# Patient Record
Sex: FEMALE | Race: WHITE | NOT HISPANIC OR LATINO | Employment: OTHER | URBAN - METROPOLITAN AREA
[De-identification: names, ages, dates, MRNs, and addresses within clinical notes are randomized per-mention and may not be internally consistent; named-entity substitution may affect disease eponyms.]

---

## 2017-02-01 ENCOUNTER — TRANSCRIBE ORDERS (OUTPATIENT)
Dept: ADMINISTRATIVE | Facility: HOSPITAL | Age: 77
End: 2017-02-01

## 2017-02-01 ENCOUNTER — APPOINTMENT (OUTPATIENT)
Dept: LAB | Facility: HOSPITAL | Age: 77
End: 2017-02-01
Attending: INTERNAL MEDICINE
Payer: MEDICARE

## 2017-02-01 DIAGNOSIS — C54.1 MALIGNANT NEOPLASM OF ENDOMETRIUM (HCC): ICD-10-CM

## 2017-02-01 DIAGNOSIS — I10 ESSENTIAL (PRIMARY) HYPERTENSION: ICD-10-CM

## 2017-02-01 LAB
ALBUMIN SERPL BCP-MCNC: 3.3 G/DL (ref 3.5–5)
ALP SERPL-CCNC: 90 U/L (ref 46–116)
ALT SERPL W P-5'-P-CCNC: 29 U/L (ref 12–78)
ANION GAP SERPL CALCULATED.3IONS-SCNC: 8 MMOL/L (ref 4–13)
AST SERPL W P-5'-P-CCNC: 26 U/L (ref 5–45)
BILIRUB SERPL-MCNC: 0.5 MG/DL (ref 0.2–1)
BUN SERPL-MCNC: 18 MG/DL (ref 5–25)
CALCIUM SERPL-MCNC: 8.9 MG/DL (ref 8.3–10.1)
CHLORIDE SERPL-SCNC: 103 MMOL/L (ref 100–108)
CO2 SERPL-SCNC: 29 MMOL/L (ref 21–32)
CREAT SERPL-MCNC: 0.88 MG/DL (ref 0.6–1.3)
ERYTHROCYTE [DISTWIDTH] IN BLOOD BY AUTOMATED COUNT: 12.9 % (ref 11.6–15.1)
GFR SERPL CREATININE-BSD FRML MDRD: >60 ML/MIN/1.73SQ M
GLUCOSE SERPL-MCNC: 92 MG/DL (ref 65–140)
HCT VFR BLD AUTO: 39.5 % (ref 34.8–46.1)
HGB BLD-MCNC: 12.6 G/DL (ref 11.5–15.4)
MCH RBC QN AUTO: 30 PG (ref 26.8–34.3)
MCHC RBC AUTO-ENTMCNC: 31.9 G/DL (ref 31.4–37.4)
MCV RBC AUTO: 94 FL (ref 82–98)
PLATELET # BLD AUTO: 180 THOUSANDS/UL (ref 149–390)
PMV BLD AUTO: 10.7 FL (ref 8.9–12.7)
POTASSIUM SERPL-SCNC: 4.2 MMOL/L (ref 3.5–5.3)
PROT SERPL-MCNC: 6.7 G/DL (ref 6.4–8.2)
RBC # BLD AUTO: 4.2 MILLION/UL (ref 3.81–5.12)
SODIUM SERPL-SCNC: 140 MMOL/L (ref 136–145)
TSH SERPL DL<=0.05 MIU/L-ACNC: 2.28 UIU/ML (ref 0.36–3.74)
WBC # BLD AUTO: 4.1 THOUSAND/UL (ref 4.31–10.16)

## 2017-02-01 PROCEDURE — 80053 COMPREHEN METABOLIC PANEL: CPT

## 2017-02-01 PROCEDURE — 36415 COLL VENOUS BLD VENIPUNCTURE: CPT

## 2017-02-01 PROCEDURE — 84443 ASSAY THYROID STIM HORMONE: CPT

## 2017-02-01 PROCEDURE — 85027 COMPLETE CBC AUTOMATED: CPT

## 2017-02-05 ENCOUNTER — GENERIC CONVERSION - ENCOUNTER (OUTPATIENT)
Dept: OTHER | Facility: OTHER | Age: 77
End: 2017-02-05

## 2017-02-10 ENCOUNTER — GENERIC CONVERSION - ENCOUNTER (OUTPATIENT)
Dept: OTHER | Facility: OTHER | Age: 77
End: 2017-02-10

## 2017-02-13 ENCOUNTER — HOSPITAL ENCOUNTER (OUTPATIENT)
Dept: CT IMAGING | Facility: HOSPITAL | Age: 77
Discharge: HOME | End: 2017-02-13
Attending: OBSTETRICS & GYNECOLOGY
Payer: MEDICARE

## 2017-02-13 DIAGNOSIS — C54.1 MALIGNANT NEOPLASM OF ENDOMETRIUM (HCC): ICD-10-CM

## 2017-02-13 PROCEDURE — 74177 CT ABD & PELVIS W/CONTRAST: CPT

## 2017-02-13 PROCEDURE — 71260 CT THORAX DX C+: CPT

## 2017-02-13 RX ADMIN — IOHEXOL 100 ML: 240 INJECTION, SOLUTION INTRATHECAL; INTRAVASCULAR; INTRAVENOUS; ORAL at 14:17

## 2017-02-15 ENCOUNTER — ALLSCRIPTS OFFICE VISIT (OUTPATIENT)
Dept: OTHER | Facility: OTHER | Age: 77
End: 2017-02-15

## 2017-03-09 ENCOUNTER — ALLSCRIPTS OFFICE VISIT (OUTPATIENT)
Dept: OTHER | Facility: OTHER | Age: 77
End: 2017-03-09

## 2017-05-17 ENCOUNTER — ALLSCRIPTS OFFICE VISIT (OUTPATIENT)
Dept: OTHER | Facility: OTHER | Age: 77
End: 2017-05-17

## 2017-05-31 ENCOUNTER — GENERIC CONVERSION - ENCOUNTER (OUTPATIENT)
Dept: OTHER | Facility: OTHER | Age: 77
End: 2017-05-31

## 2017-07-10 ENCOUNTER — ALLSCRIPTS OFFICE VISIT (OUTPATIENT)
Dept: OTHER | Facility: OTHER | Age: 77
End: 2017-07-10

## 2017-07-10 DIAGNOSIS — Z13.820 ENCOUNTER FOR SCREENING FOR OSTEOPOROSIS: ICD-10-CM

## 2017-07-19 ENCOUNTER — HOSPITAL ENCOUNTER (OUTPATIENT)
Dept: BONE DENSITY | Facility: IMAGING CENTER | Age: 77
Discharge: HOME | End: 2017-07-19
Payer: MEDICARE

## 2017-07-19 DIAGNOSIS — Z13.820 ENCOUNTER FOR SCREENING FOR OSTEOPOROSIS: ICD-10-CM

## 2017-07-19 PROCEDURE — 77080 DXA BONE DENSITY AXIAL: CPT

## 2017-07-20 ENCOUNTER — GENERIC CONVERSION - ENCOUNTER (OUTPATIENT)
Dept: OTHER | Facility: OTHER | Age: 77
End: 2017-07-20

## 2017-07-27 ENCOUNTER — ALLSCRIPTS OFFICE VISIT (OUTPATIENT)
Dept: OTHER | Facility: OTHER | Age: 77
End: 2017-07-27

## 2017-08-16 ENCOUNTER — ALLSCRIPTS OFFICE VISIT (OUTPATIENT)
Dept: OTHER | Facility: OTHER | Age: 77
End: 2017-08-16

## 2017-08-22 ENCOUNTER — GENERIC CONVERSION - ENCOUNTER (OUTPATIENT)
Dept: OTHER | Facility: OTHER | Age: 77
End: 2017-08-22

## 2017-09-12 ENCOUNTER — GENERIC CONVERSION - ENCOUNTER (OUTPATIENT)
Dept: OTHER | Facility: OTHER | Age: 77
End: 2017-09-12

## 2017-10-11 ENCOUNTER — GENERIC CONVERSION - ENCOUNTER (OUTPATIENT)
Dept: OTHER | Facility: OTHER | Age: 77
End: 2017-10-11

## 2017-10-17 ENCOUNTER — ALLSCRIPTS OFFICE VISIT (OUTPATIENT)
Dept: OTHER | Facility: OTHER | Age: 77
End: 2017-10-17

## 2017-10-18 NOTE — PROGRESS NOTES
Assessment  1  Hearing loss of left ear (389 9) (H91 92)    Plan  Hearing loss of left ear    · Removal Impacted Cerumen Using Irrigation / Lavage one or both ears - POC; Status:Complete;    Done: 06PTB6033 02:36PM    Chief Complaint  Chief Complaint Free Text Note Form: Pt is here today for a check up visit , Medications are current   History of Present Illness  Hearing Loss (Brief): The patient is being seen for a routine clinic follow-up of hearing loss  The patient presents with complaints of gradual onset of moderate left ear hearing loss, described as difficulty with all sounds  Review of Systems  Complete-Female:   Constitutional: no fever  Cardiovascular: no chest pain  Respiratory: no shortness of breath  Active Problems  1  Active advance directive (V49 89) (Z78 9)   2  Allergic rhinitis (477 9) (J30 9)   3  Anxiety (300 00) (F41 9)   4  Drug-induced neutropenia (288 03,E980 5) (D70 2)   5  Endometrial adenocarcinoma (182 0) (C54 1)   6  External hemorrhoids (455 3) (K64 4)   7  Gastroesophageal reflux disease without esophagitis (530 81) (K21 9)   8  HTN (hypertension) (401 9) (I10)   9  Denied: History of Mental health problem   10  Morbid obesity with BMI of 45 0-49 9, adult (278 01,V85 42) (E66 01,Z68 42)   11  Need for pneumococcal vaccine (V03 82) (Z23)   12  Osteoarthrosis (715 90) (M19 90)   13  Other hyperlipidemia (272 4) (E78 4)   14  Denied: History of Substance abuse    Past Medical History  1  Cataract (366 9) (H26 9)   2  History of CVA (cerebral infarction) (434 91) (I63 9)   3  History of Depression (311) (F32 9)   4  History of Dietary counseling and surveillance (V65 3) (Z71 3)   5  History of Distress, epigastric (789 06) (R10 13)   6  History of Ear ringing sound (388 30) (H93 19)   7  History of  0 (V49 89)   8  Hearing loss of left ear (389 9) (H91 92)   9  Impacted cerumen of left ear (380 4) (H61 22)   10  History of Influenza (487 1) (J11 1)   11  Denied: History of Mental health problem   12  Preoperative examination (V72 84) (Z01 818)   13  Retinopathy (362 10) (H35 00)   14  Denied: History of Substance abuse   15  History of URI (upper respiratory infection) (465 9) (J06 9)  Active Problems And Past Medical History Reviewed: The active problems and past medical history were reviewed and updated today  Surgical History  1  History of Eye Surgery   2  Denied: History Of Prior Surgery   3  History of Hysterectomy Robotic-Assisted   4  History of Salpingo-oophorectomy Bilateral    Family History  Mother    1  Family history of congestive heart failure (V17 49) (Z82 49)  Father    2  Family history of Alzheimer's disease (V17 2) (Z82 0)  Sister    3  Family history of malignant neoplasm of breast (V16 3) (Z80 3)  Brother    4  Family history of malignant neoplasm of prostate (V16 42) (Z80 45)  Family History    5  Denied: Family history of substance abuse   6  Denied: Family history of Mental problem    Social History   · Active advance directive (V49 89) (Z78 9)   · Always uses seat belt   · Caffeine use (V49 89) (F15 90)   · Cultural background   · Dental care, regularly   · Exercise: Yardwork   · Denied: History of Finding of    · Living alone (V60 3) (Z60 2)   · Living Situation: Supportive and safe   · Never a smoker   · No drug use   · Primary spoken language English   · Muslim   · Racial background   · Recent change in lifestyle   · Single   · Social alcohol use (Z78 9)    Current Meds   1  Aspirin 81 MG Oral Tablet Chewable; USE AS DIRECTED; Therapy: (Recorded:13Wuc2516) to Recorded   2  Atorvastatin Calcium 10 MG Oral Tablet; TAKE ONE TABLET BY MOUTH ONCE DAILY; Therapy: 26FMT3943 to (Evaluate:23Vyr5951); Last Rx:52Woy1476 Ordered   3  Calcium 600 + D 600-200 MG-UNIT Oral Tablet; TAKE 1 TABLET DAILY; Therapy: 64AHQ4145 to Recorded   4   LORazepam 1 MG Oral Tablet; 1 tablet daily  and an additional 1/2 tab prn; Last Rx: 78TRZ2166   Ordered   5  Metoprolol Succinate ER 25 MG Oral Tablet Extended Release 24 Hour; take one tablet by mouth one   time daily; Therapy: 18CTV5108 to (Mona August)  Requested for: 07SQK5798; Last Rx:79Prf3280   Ordered   6  Multiple Vitamin TABS; TAKE 1 TABLET DAILY; Therapy: (Recorded:08Jan2016) to Recorded   7  Valsartan 320 MG Oral Tablet; TAKE 1 TABLET DAILY; Therapy: 18BZS3893 to (Evaluate:06Apr2018); Last Rx:98Uqp4473 Ordered   8  Vitamin D3 1000 UNIT Oral Tablet; 1 qd; Therapy: (Recorded:08Jan2016) to Recorded  Medication List Reviewed: The medication list was reviewed and updated today  Allergies  1  Aspirin TABS   2  FLU   3  ACE Inhibitors   4  Zithromax TABS  5  Chocolate   6  Eggs    Vitals  Vital Signs    Recorded: 54HJK7876 01:59PM   Heart Rate 60, R Radial   Pulse Quality Normal, R Radial   Systolic 350, LUE, Sitting   Diastolic 72, LUE, Sitting   Height 4 ft 11 in   Weight 216 lb 0 5 oz   BMI Calculated 43 63   BSA Calculated 1 91     Physical Exam    Constitutional   General appearance: Abnormal   well developed,-- comfortable-- and-- obese  Ears, Nose, Mouth, and Throat   Otoscopic examination: Abnormal   The left external canal had a cerumen impaction  Pulmonary   Auscultation of lungs: Clear to auscultation  Cardiovascular   Auscultation of heart: Normal rate and rhythm, normal S1 and S2, without murmurs  Procedure            Procedure: cerumen removal    Indication: tympanic membrane(s) could not be visualized and cerumen impaction in the left ear  Procedure Note: The procedure was performed by the Provider  A otoscope was placed in the ear canal(s) to visualize the ear canal debris  The ear was cleaned by using warm water irrigation  The procedure was partially successful  Post-Procedure:   Complications: there were no complications  Follow-up as needed              Future Appointments    Date/Time Provider Specialty Site   11/15/2017 01:15 PM Domenica Wesley MD Gynecological Oncology [facility]     Signatures   Electronically signed by : BRISA Oropeza ; Oct 17 2017  2:36PM EST                       (Author)

## 2017-11-15 ENCOUNTER — GENERIC CONVERSION - ENCOUNTER (OUTPATIENT)
Dept: OTHER | Facility: OTHER | Age: 77
End: 2017-11-15

## 2017-11-15 ENCOUNTER — TRANSCRIBE ORDERS (OUTPATIENT)
Dept: ADMINISTRATIVE | Facility: HOSPITAL | Age: 77
End: 2017-11-15

## 2017-11-15 DIAGNOSIS — C54.1 ENDOMETRIAL SARCOMA (HCC): Primary | ICD-10-CM

## 2018-01-09 NOTE — PROGRESS NOTES
Assessment    1  HTN (hypertension) (401 9) (I10)   2  Drug-induced neutropenia (288 03,E980 5) (D70 2)   3  Morbid obesity with BMI of 45 0-49 9, adult (278 01,V85 42) (N82 56,S93 56)    Plan  Morbid obesity with BMI of 45 0-49 9, adult    · Some eating tips that can help you lose weight ; Status:Complete;   Done: 41QWC2983 01:06PM    Chief Complaint  Chief Complaint Free Text Note Form: Pt is here today for a 4 month follow up visit, Pt had labs done recently  Pt was to see Dr Melanie Jones since her last office visit Reviewed pt med list she needs a few refills DD      History of Present Illness  Hypertension (Follow-Up): The patient presents for follow-up of essential hypertension  The patient states she has been stable with her blood pressure control since the last visit  Symptoms: denies dyspnea and denies chest pain  Medications: the patient is adherent with her medication regimen  She denies medication side effects  Review of Systems  Complete-Female:   Constitutional: recent 4 lb weight gain, but no fever and no chills  Eyes: no eyesight problems  ENT: no hearing loss  Cardiovascular: as noted in HPI  Respiratory: as noted in HPI  Gastrointestinal: no abdominal pain, no constipation, no blood in stools and no heartburn  Genitourinary: no dysuria and no incontinence  Integumentary: no rashes  Neurological: no headache  Psychiatric: no anxiety and no depression  Active Problems    1  Active advance directive (V49 89) (Z78 9)   2  Allergic rhinitis (477 9) (J30 9)   3  Anxiety (300 00) (F41 9)   4  Drug-induced neutropenia (288 03,E980 5) (D70 2)   5  Encounter for antineoplastic chemotherapy (V58 11) (Z51 11)   6  Endometrial adenocarcinoma (182 0) (C54 1)   7  External hemorrhoids (455 3) (K64 4)   8  Gastroesophageal reflux disease without esophagitis (530 81) (K21 9)   9  History of self breast exam   10  HTN (hypertension) (401 9) (I10)   11   Impacted cerumen of left ear (380 4) (H61 22)   12  Denied: History of Mental health problem   13  Morbid obesity with BMI of 45 0-49 9, adult (278 01,V85 42) (E66 01,Z68 42)   14  Need for pneumococcal vaccine (V03 82) (Z23)   15  No advance directive on file (V49 89) (Z78 9)   16  Osteoarthrosis (715 90) (M19 90)   17  Other hyperlipidemia (272 4) (E78 4)   18  Postoperative examination (V67 00) (Z09)   19  Preoperative examination (V72 84) (Z01 818)   20  Screening for genitourinary condition (V81 6) (Z13 89)   21  Denied: History of Substance abuse    Past Medical History    1  History of CVA (cerebral infarction) (434 91) (I63 9)   2  History of Depression (311) (F32 9)   3  History of Dietary counseling and surveillance (V65 3) (Z71 3)   4  History of Distress, epigastric (789 06) (R10 13)   5  History of Ear ringing sound (388 30) (H93 19)   6  History of  0 (V49 89) (Z78 9)   7  Hearing loss of left ear (389 9) (H91 92)   8  History of Influenza (487 1) (J11 1)   9  Denied: History of Mental health problem   10  Retinopathy (362 10) (H35 00)   11  Denied: History of Substance abuse   12  History of URI (upper respiratory infection) (465 9) (J06 9)  Active Problems And Past Medical History Reviewed: The active problems and past medical history were reviewed and updated today  Surgical History    1  History of Eye Surgery   2  Denied: History Of Prior Surgery   3  History of Hysterectomy Robotic-Assisted   4  History of Salpingo-oophorectomy Bilateral  Surgical History Reviewed: The surgical history was reviewed and updated today  Family History  Mother    1  Family history of congestive heart failure (V17 49) (Z82 49)  Father    2  Family history of Alzheimer's disease (V17 2) (Z82 0)  Sister    3  Family history of malignant neoplasm of breast (V16 3) (Z80 3)  Brother    4  Family history of malignant neoplasm of prostate (V16 42) (Z80 45)  Family History    5  Denied: Family history of substance abuse   6   Denied: Family history of Mental problem  Family History Reviewed: The family history was reviewed and updated today  Social History    · Active advance directive (V49 89) (Z78 9)   · Always uses seat belt   · Caffeine use (V49 89) (F15 90)   · Cultural background   · Dental care, regularly   · Exercise: Yardwork   · Denied: History of Finding of    · Living alone (V60 3) (Z60 2)   · Living Situation: Supportive and safe   · Never a smoker   · No advance directive on file (V49 89) (Z78 9)   · No drug use   · Primary spoken language English   · Religious   · Racial background   · Recent change in lifestyle   · Single   · Social alcohol use (Z78 9)  Social History Reviewed: The social history was reviewed and updated today  Current Meds   1  Aspirin 81 MG Oral Tablet Chewable; USE AS DIRECTED; Therapy: (Recorded:2016) to Recorded   2  Atorvastatin Calcium 10 MG Oral Tablet; TAKE ONE TABLET BY MOUTH ONCE DAILY; Therapy: 80BQC0685 to (Evaluate:2017); Last Rx:2016 Ordered   3  Calcium 600 + D 600-200 MG-UNIT Oral Tablet; TAKE 1 TABLET DAILY; Therapy: 71UGV5550 to Recorded   4  Hydrocortisone 2 5 % Rectal Cream; APPLY CREAM TO RECTAL AREA,(2) TWO TIMES  DAILY; Therapy: 24Jmd0973 to (Last Rx:2016) Ordered   5  LORazepam 1 MG Oral Tablet; 1 tablet daily  and an additional 1/2 tab prn; Last Rx:2016   Ordered   6  Metoprolol Succinate ER 25 MG Oral Tablet Extended Release 24 Hour; take one tablet by mouth one   time daily; Therapy: 98EPN6327 to [id number])  Requested for: 95OVX9757; Last Rx:2016   Ordered   7  Multiple Vitamin TABS; TAKE 1 TABLET DAILY; Therapy: (Recorded:2016) to Recorded   8  Nasonex 50 MCG/ACT Nasal Suspension; USE 2 SPRAYS IN EACH NOSTRIL ONCE DAILY; Last   Rx:2016 Ordered   9  Omeprazole 20 MG Oral Capsule Delayed Release; take 1 capsule daily; Therapy: 95FWE2300 to (Last Rx:2016) Ordered   10   Valsartan 320 MG Oral Tablet; TAKE 1 TABLET DAILY; Therapy: 84UXL1266 to (Evaluate:87Vhz9313); Last Rx:95Pxl5659 Ordered   11  Vitamin D3 1000 UNIT Oral Tablet; 1 qd; Therapy: (Recorded:08Jan2016) to Recorded  Medication List Reviewed: The medication list was reviewed and updated today  Allergies    1  Aspirin TABS   2  FLU   3  ACE Inhibitors   4  Zithromax TABS    5  Chocolate   6  Eggs    Vitals  Vital Signs    Recorded: 52XPH1669 12:57PM   Temperature 98 5 F, Tympanic   Heart Rate 60, L Radial   Pulse Quality Normal, L Radial   Systolic 278, LUE, Sitting   Diastolic 84, LUE, Sitting   Height 4 ft 11 in   Weight 216 lb 0 5 oz   BMI Calculated 43 63   BSA Calculated 1 91     Physical Exam    Constitutional   General appearance: Abnormal   well developed and morbidly obese  Ears, Nose, Mouth, and Throat   Otoscopic examination: Tympanic membranes translucent with normal light reflex  Canals patent without erythema  Oropharynx: Normal with no erythema, edema, exudate or lesions  Pulmonary   Auscultation of lungs: Clear to auscultation  Cardiovascular   Auscultation of heart: Normal rate and rhythm, normal S1 and S2, without murmurs  Abdomen   Abdomen: Non-tender, no masses  Lymphatic   Palpation of lymph nodes in neck: No lymphadenopathy      Musculoskeletal   Gait and station: Normal     Psychiatric   Orientation to person, place, and time: Normal     Mood and affect: Normal          Future Appointments    Date/Time Provider Specialty Site   05/17/2017 02:00 PM Rosa Buchanan MD Gynecological Oncology [facility]     Signatures   Electronically signed by : BRISA Worrell ; Mar  9 2017  1:18PM EST                       (Author)

## 2018-01-10 NOTE — RESULT NOTES
Verified Results  (1) CBC/ PLT (NO DIFF) [id number] 11:20AM Abner Goldmann Order Number: [de-identified]     Test Name Result Flag Reference   HEMATOCRIT 39 5 %  34 8-46 1   HEMOGLOBIN 12 6 g/dL  11 5-15 4   MCHC 31 9 g/dL  31 4-37 4   MCH 30 0 pg  26 8-34 3   MCV 94 fL  82-98   PLATELET COUNT 221 Thousands/uL  149-390   RBC COUNT 4 20 Million/uL  3 81-5 12   RDW 12 9 %  11 6-15 1   WBC COUNT 4 10 Thousand/uL L 4 31-10 16   MPV 10 7 fL  8 9-12 7     (1) COMPREHENSIVE METABOLIC PANEL [id number] 49:51KP Abner Goldmann Order Number: [de-identified]     Test Name Result Flag Reference   GLUCOSE,RANDM 92 mg/dL     If the patient is fasting, the ADA then defines impaired fasting glucose as > 100 mg/dL and diabetes as > or equal to 123 mg/dL  SODIUM 140 mmol/L  136-145   POTASSIUM 4 2 mmol/L  3 5-5 3   CHLORIDE 103 mmol/L  100-108   CARBON DIOXIDE 29 mmol/L  21-32   ANION GAP (CALC) 8 mmol/L  4-13   BLOOD UREA NITROGEN 18 mg/dL  5-25   CREATININE 0 88 mg/dL  0 60-1 30   Standardized to IDMS reference method   CALCIUM 8 9 mg/dL  8 3-10 1   BILI, TOTAL 0 50 mg/dL  0 20-1 00   ALK PHOSPHATAS 90 U/L     ALT (SGPT) 29 U/L  12-78   AST(SGOT) 26 U/L  5-45   ALBUMIN 3 3 g/dL L 3 5-5 0   TOTAL PROTEIN 6 7 g/dL  6 4-8 2   eGFR Non-African American      >60 0 ml/min/1 73sq m   - Patient Instructions: This bloodwork is non-fasting  Please drink two glasses of water morning of bloodwork  National Kidney Disease Education Program recommendations are as follows:  GFR calculation is accurate only with a steady state creatinine  Chronic Kidney disease less than 60 ml/min/1 73 sq  meters  Kidney failure less than 15 ml/min/1 73 sq  meters  (1) TSH [id number] 11:20AM Abner Goldmann Order Number: [de-identified]     Test Name Result Flag Reference   TSH 2 281 uIU/mL  0 358-3 740   - Patient Instructions: This bloodwork is non-fasting  Please drink two glasses of water morning of bloodwork      - Patient Instructions: This bloodwork is non-fasting  Please drink two glasses of water morning of bloodwork  Patients undergoing fluorescein dye angiography may retain small amounts of fluorescein in the body for 48-72 hours post procedure  Samples containing fluorescein can produce falsely depressed TSH values  If the patient had this procedure,a specimen should be resubmitted post fluorescein clearance            The recommended reference ranges for TSH during pregnancy are as follows:  First trimester 0 1 to 2 5 uIU/mL  Second trimester  0 2 to 3 0 uIU/mL  Third trimester 0 3 to 3 0 uIU/m

## 2018-01-10 NOTE — PROGRESS NOTES
Assessment    1  HTN (hypertension) (401 9) (I10)   2  Drug-induced neutropenia (288 03,E980 5) (D70 2)   3  Morbid obesity with BMI of 45 0-49 9, adult (278 01,V85 42) (B49 11,G58 26)    Plan  Anxiety    · LORazepam 1 MG Oral Tablet; 1 tablet daily  and an additional 1/2 tab prn  HTN (hypertension)    · Metoprolol Succinate ER 25 MG Oral Tablet Extended Release 24 Hour (Toprol  XL); take one tablet by mouth one time daily  Morbid obesity with BMI of 45 0-49 9, adult    · Some eating tips that can help you lose weight ; Status:Complete;   Done: 53YXD2462    Discussion/Summary  Impression: Subsequent Annual Wellness Visit, with preventive exam as well as age and risk appropriate counseling completed  Cardiovascular screening and counseling: the risks and benefits of screening were discussed, screening is current and counseling was given on maintaining a healthy diet  Diabetes screening and counseling: the risks and benefits of screening were discussed, screening is current and counseling was given on maintaining a healthy diet  Colorectal cancer screening and counseling: the risks and benefits of screening were discussed and screening is current  Breast cancer screening and counseling: the risks and benefits of screening were discussed and screening not indicated  Cervical cancer screening and counseling: the risks and benefits of screening were discussed  Osteoporosis screening and counseling: the risks and benefits of screening were discussed, counseling was given on obtaining adequate amounts of calcium and vitamin D on a daily basis and counseling was given on the importance of regular weightbearing exercise  Abdominal aortic aneurysm screening and counseling: the risks and benefits of screening were discussed  Glaucoma screening and counseling: the risks and benefits of screening were discussed  HIV screening and counseling: screening not indicated   Immunizations: the patient declines the influenza vaccination, the patient declines the pneumococcal vaccination and the patient declines the Td vaccine  Advance Directive Planning: complete and up to date  Patient Discussion: plan discussed with the patient, follow-up visit needed in one year  History of Present Illness  Welcome to Medicare and Wellness Visits: The patient is being seen for the subsequent annual wellness visit  Medicare Screening and Risk Factors   Hospitalizations: she has been previously hospitalizied and she has been hospitalized 1 times  Medicare Screening Tests Risk Questions   Abdominal aortic aneurysm risk assessment: over 72years of age  Osteoporosis risk assessment: over 48years of age  HIV risk assessment: none indicated  Drug and Alcohol Use: The patient has never smoked cigarettes  The patient reports never drinking alcohol  She has never used illicit drugs  Diet and Physical Activity: Current diet includes well balanced meals  She exercises infrequently  Exercise: walking  Mood Disorder and Cognitive Impairment Screening: She denies feeling down, depressed, or hopeless over the past two weeks  She denies feeling little interest or pleasure in doing things over the past two weeks  Cognitive impairment screening: denies difficulty learning/retaining new information  Functional Ability/Level of Safety: Hearing is normal bilaterally and a hearing aid is not used  The patient is currently able to do activities of daily living without limitations  Activities of daily living details: no meal preparation help needed, does not need help managing medications and does not need help managing money  Fall risk factors: The patient fell 0 times in the past 12 months  Advance Directives: Advance directives: living will, advance directives and level 3 dnr  end of life decisions were reviewed with the patient and I agree with the patient's decisions     Co-Managers and Medical Equipment/Suppliers: See Patient Care Team      Patient Care Team    Care Team Member Role Specialty Office Number   Jaimiealeksandra BERRIOS  Attending Internal Medicine [phone]   Samina Morillo  Gynecological Oncology [phone]   Nina Boyle MD  Radiation Oncology [phone]     Active Problems    1  Active advance directive (V49 89) (Z78 9)   2  Allergic rhinitis (477 9) (J30 9)   3  Anxiety (300 00) (F41 9)   4  Drug-induced neutropenia (288 03,E980 5) (D70 2)   5  Encounter for antineoplastic chemotherapy (V58 11) (Z51 11)   6  Endometrial adenocarcinoma (182 0) (C54 1)   7  External hemorrhoids (455 3) (K64 4)   8  Gastroesophageal reflux disease without esophagitis (530 81) (K21 9)   9  History of self breast exam   10  HTN (hypertension) (401 9) (I10)   11  Impacted cerumen of left ear (380 4) (H61 22)   12  Denied: History of Mental health problem   13  Morbid obesity with BMI of 45 0-49 9, adult (278 01,V85 42) (E66 01,Z68 42)   14  Need for pneumococcal vaccine (V03 82) (Z23)   15  No advance directive on file (V49 89) (Z78 9)   16  Osteoarthrosis (715 90) (M19 90)   17  Other hyperlipidemia (272 4) (E78 4)   18  Postoperative examination (V67 00) (Z09)   19  Preoperative examination (V72 84) (Z01 818)   20  Screening for genitourinary condition (V81 6) (Z13 89)   21   Denied: History of Substance abuse    Past Medical History    · History of CVA (cerebral infarction) (434 91) (I63 9)   · History of Depression (311) (F32 9)   · History of Dietary counseling and surveillance (V65 3) (Z71 3)   · History of Distress, epigastric (789 06) (R10 13)   · History of Ear ringing sound (388 30) (H93 19)   · History of  0 (V49 89) (Z78 9)   · Hearing loss of left ear (389 9) (H91 92)   · History of Influenza (487 1) (J11 1)   · Denied: History of Mental health problem   · Retinopathy (362 10) (H35 00)   · Denied: History of Substance abuse   · History of URI (upper respiratory infection) (465 9) (J06 9)    Surgical History    · History of Eye Surgery   · Denied: History Of Prior Surgery   · History of Hysterectomy Robotic-Assisted   · History of Salpingo-oophorectomy Bilateral    Family History  Mother    · Family history of congestive heart failure (V17 49) (Z82 49)  Father    · Family history of Alzheimer's disease (V17 2) (Z82 0)  Sister    · Family history of malignant neoplasm of breast (V16 3) (Z80 3)  Brother    · Family history of malignant neoplasm of prostate (V16 42) (Z80 45)  Family History    · Denied: Family history of substance abuse   · Denied: Family history of Mental problem    Social History    · Active advance directive (V49 89) (Z78 9)   · Always uses seat belt   · Caffeine use (V49 89) (F15 90)   · CAFFEINE IN A M    · Cultural background   · NOT  OR    · Dental care, regularly   · Exercise: Yardwork   · Denied: History of Finding of    · Living alone (V60 3) (Z60 2)   · Living Situation: Supportive and safe   · Never a smoker   · No advance directive on file (V49 89) (Z78 9)   · No drug use   · Primary spoken language English   · Yarsanism   · Racial background   · WHITE   · Recent change in lifestyle   · Single   · Social alcohol use (Z78 9)    Current Meds   1  Aspirin 81 MG Oral Tablet Chewable; USE AS DIRECTED; Therapy: (Recorded:2016) to Recorded   2  Atorvastatin Calcium 10 MG Oral Tablet (Lipitor); TAKE ONE TABLET BY MOUTH ONCE   DAILY; Therapy: 51SYN2866 to (Evaluate:88Qoh6046); Last Rx:40Bcb9928 Ordered   3  Calcium 600 + D 600-200 MG-UNIT Oral Tablet; TAKE 1 TABLET DAILY; Therapy: 65KQW1940 to Recorded   4  Hydrocortisone 2 5 % Rectal Cream; APPLY CREAM TO RECTAL AREA,(2) TWO TIMES    DAILY; Therapy: 35Qit6912 to (Last Rx:81Ldq6986) Ordered   5  LORazepam 1 MG Oral Tablet; 1 tablet daily  and an additional 1/2 tab prn; Last   Rx:68Ahu2038 Ordered   6  Metoprolol Succinate ER 25 MG Oral Tablet Extended Release 24 Hour; take one tablet   by mouth one time daily;    Therapy: 94SDI4085 to (Evaluate:72Oru1392)  Requested for: 42JTC8709; Last   Rx:02Mar2016 Ordered   7  Multiple Vitamin TABS; TAKE 1 TABLET DAILY; Therapy: (Recorded:08Jan2016) to Recorded   8  Nasonex 50 MCG/ACT Nasal Suspension (Mometasone Furoate); USE 2 SPRAYS IN   EACH NOSTRIL ONCE DAILY; Last Rx:02Mar2016 Ordered   9  Omeprazole 20 MG Oral Capsule Delayed Release (PriLOSEC); take 1 capsule daily; Therapy: 34PWP8469 to (Last Rx:06Sep2016) Ordered   10  Valsartan 320 MG Oral Tablet; TAKE 1 TABLET DAILY; Therapy: 68KEP7424 to (Evaluate:08Sep2017); Last Rx:12Dec2016 Ordered   11  Vitamin D3 1000 UNIT Oral Tablet; 1 qd; Therapy: (Recorded:08Jan2016) to Recorded    Allergies    1  Aspirin TABS   2  FLU   3  ACE Inhibitors   4  Zithromax TABS    5  Chocolate   6   Eggs    Immunizations   1    Influenza  Permanently Deferred: Medical Deferral, egg allergy, 55FLC7666    PPSV  Permanently Deferred: Medical Deferral, 60USX2022    Tetanus  2004     Vitals  Signs    Temperature: 98 5 F, Tympanic  Heart Rate: 60, L Radial  Pulse Quality: Normal, L Radial  Systolic: 093, LUE, Sitting  Diastolic: 84, LUE, Sitting  Height: 4 ft 11 in  Weight: 216 lb 0 5 oz  BMI Calculated: 43 63  BSA Calculated: 1 91    Future Appointments    Date/Time Provider Specialty Site   05/17/2017 02:00 PM Layton Ceja MD Gynecological Oncology [facility]     Signatures   Electronically signed by : BRISA Noriega ; Mar  9 2017  1:27PM EST                       (Author)

## 2018-01-10 NOTE — RESULT NOTES
Verified Results  * DXA BONE DENSITY SPINE HIP AND PELVIS 03NOB2026 01:51PM Abner Goldmann Order Number: [de-identified]    - Patient Instructions: To schedule this appointment, please contact Central Scheduling at [phone]  Test Name Result Flag Reference   DXA BONE DENSITY SPINE HIP AND PELVIS (Report)     CENTRAL DXA SCAN     CLINICAL HISTORY:  64year old post-menopausal  female with history of uterine cancer and use of antireflux medication  The patient walks and takes calcium and vitamin D supplements  TECHNIQUE: Bone densitometry was performed using a Hologic Horizon A bone densitometer  Regions of interest appear properly placed  There are no obvious fractures or other confounding variables which could limit the study  The BMD of L3 and L4 is    elevated, compared to the BMD of L1 and L2, most likely secondary to degenerative change, although this should correlated with the patient's clinical history  L3 and L4 were excluded from calculation of lumbar spine BMD      COMPARISON: None  RESULTS:    LUMBAR SPINE: L1-L2:   BMD 0 913 gm/cm2   T-score -0 6   Z-score 1 9     LEFT TOTAL HIP:   BMD 0 927 gm/cm2   T-score -0 1   Z-score 1 9     LEFT FEMORAL NECK:   BMD 0 902 gm/cm2   T-score 0 5   Z-score 2 7             IMPRESSION:   1  Based on the [facility] classification, this study is normal and the patient is considered at low risk for fracture  2  A daily intake of calcium of at least 1200 mg and vitamin D, 800-1000 IU, as well as weight bearing and muscle strengthening exercise, fall prevention and avoidance of tobacco and excessive alcohol intake as basic preventive measures are recommended  3  Repeat DXA scan on the same equipment in 18-24 months as clinically indicated  The 10 year risk of hip fracture is 2 4%, with the 10 year risk of major osteoporotic fracture being 9 1%, as calculated by the [facility] fracture risk assessment tool (FRAX)   The current NOF guidelines recommend treating patients with FRAX 10 year risk score   of >3% for hip fracture and >20% for major osteoporotic fracture        WHO CLASSIFICATION:   Normal (a T-score of -1 0 or higher)   Low bone mineral density (a T-score of less than -1 0 but higher than -2 5)   Osteoporosis (a T-score of -2 5 or less)   Severe osteoporosis (a T-score of -2 5 or less with a fragility fracture)             Workstation performed: [id number]     Signed by:   Genaro Bernardo MD   7/19/17

## 2018-01-11 ENCOUNTER — GENERIC CONVERSION - ENCOUNTER (OUTPATIENT)
Dept: OTHER | Facility: OTHER | Age: 78
End: 2018-01-11

## 2018-01-12 NOTE — PROGRESS NOTES
Assessment    1  HTN (hypertension) (401 9) (I10)    Plan  HTN (hypertension)    · (1) CBC/ PLT (NO DIFF); Status:Active; Requested for:76Bbc1946;    · (1) COMPREHENSIVE METABOLIC PANEL; Status:Active; Requested for:31Jlc4809;    · (1) HEMOGLOBIN A1C; Status:Active; Requested for:64Ztn1046;    · (1) TSH; Status:Active; Requested for:86Swa4124;   Need for pneumococcal vaccine    · Pneumo (Pneumovax)  Pure hypercholesterolemia    · (1) LIPID PANEL, FASTING; Status:Active - Retrospective By Protocol Authorization; Requested  for:54Rkn6515;     Discussion/Summary  Counseling Documentation With Imm: The patient was counseled regarding diagnostic results  Medication SE Review and Pt Understands Tx: Possible side effects of new medications were reviewed with the patient/guardian today  The treatment plan was reviewed with the patient/guardian  The patient/guardian understands and agrees with the treatment plan   Self Referrals:   Self Referrals: Yes Dr Melchor Bro @ [address] [place] [address]      Chief Complaint  Chief Complaint Free Text Note Form: Pt here for routine follow up  Labs done 04/2016  Med list reviewed  No refills needed at this time  Offers no specific concerns  History of Present Illness  Hypertension (Follow-Up): The patient presents for follow-up of essential hypertension  Symptoms: denies dyspnea, denies chest pain and denies lower extremity edema  Medications: the patient is not adherent with her medication regimen  She denies medication side effects  Medication(s): an angiotensin receptor blocker  Active Problems    1  Active advance directive (V49 89) (Z78 9)   2  Allergic rhinitis (477 9) (J30 9)   3  Anxiety (300 00) (F41 9)   4  Cognitive deficits as late effect of cerebrovascular disease (438 0) (I69 91)   5  Constipation (564 00) (K59 00)   6  Drug-induced neutropenia (288 03,E980 5) (D70 2)   7  Encounter for antineoplastic chemotherapy (V58 11) (Z51 11)   8   Endometrial adenocarcinoma (182 0) (C54 1)   9  Gastroesophageal reflux disease without esophagitis (530 81) (K21 9)   10  History of self breast exam   11  HTN (hypertension) (401 9) (I10)   12  Denied: History of Mental health problem   13  Morbid obesity with BMI of 45 0-49 9, adult (V85 42) (Z68 42)   14  No advance directive on file (V49 89) (Z78 9)   15  Osteoarthrosis (715 90) (M19 90)   16  Postmenopausal vaginal bleeding (627 1) (N95 0)   17  Postoperative examination (V67 00) (Z09)   18  Pure hypercholesterolemia (272 0) (E78 0)   19  Screening for genitourinary condition (V81 6) (Z13 89)   20  Denied: History of Substance abuse   21  Uterine carcinoma (179) (C55)    Past Medical History    1  History of CVA (cerebral infarction) (434 91) (I63 9)   2  History of Depression (311) (F32 9)   3  History of Dietary counseling and surveillance (V65 3) (Z71 3)   4  History of Distress, epigastric (789 06) (R10 13)   5  History of Ear ringing sound (388 30) (H93 19)   6  History of  0 (V49 89) (Z78 9)   7  History of Influenza (487 1) (J11 1)   8  Denied: History of Mental health problem   9  Denied: History of Substance abuse   10  History of URI (upper respiratory infection) (465 9) (J06 9)  Active Problems And Past Medical History Reviewed: The active problems and past medical history were reviewed and updated today  Surgical History    1  Denied: History Of Prior Surgery   2  History of Hysterectomy Robotic-Assisted   3  History of Salpingo-oophorectomy Bilateral    Family History  Mother    1  Family history of congestive heart failure (V17 49) (Z82 49)  Father    2  Family history of Alzheimer's disease (V17 2) (Z82 0)  Sister    3  Family history of malignant neoplasm of breast (V16 3) (Z80 3)  Brother    4  Family history of malignant neoplasm of prostate (V16 42) (Z80 45)  Family History    5  Denied: Family history of substance abuse   6   Denied: Family history of Mental problem    Social History    · Active advance directive () (Z78 9)   · Always uses seat belt   · Caffeine use (V4 89) (F15 90)   · Cultural background   · Dental care, regularly   · Denied: History of Finding of    · Living alone (V60 3) (Z60 2)   · Living Situation: Supportive and safe   · Never a smoker   · No advance directive on file (V42 89) (Z78 9)   · No drug use   · Primary spoken language English   · Adventism   · Racial background   · Recent change in lifestyle   · Single   · Social alcohol use (Z78 9)    Current Meds   1  Aspirin Childrens 81 MG Oral Tablet Chewable; Take 1 tablet Monday, Wednesday, and Friday; Therapy: (Recorded:2016) to Recorded   2  Atorvastatin Calcium 10 MG Oral Tablet; TAKE ONE TABLET BY MOUTH ONCE DAILY; Therapy: 60BCZ4560 to (Evaluate:2016); Last Rx:2016 Ordered   3  Calcium 600 + D 600-200 MG-UNIT Oral Tablet; TAKE 1 TABLET DAILY; Therapy: 24RRY4355 to Recorded   4  Docusate Sodium 100 MG Oral Capsule; TAKE 1 CAPSULE TWICE DAILY AS NEEDED; Therapy: 61SRZ6572 to Recorded   5  LORazepam 1 MG Oral Tablet; 1 tablet daily  and an additional 1/2 tab prn; Last Rx:2016   Ordered   6  Metoprolol Succinate ER 25 MG Oral Tablet Extended Release 24 Hour; take one tablet by mouth one   time daily; Therapy: 69YRS1696 to (Isabela Noguera)  Requested for: 82GOC5192; Last Rx:2016   Ordered   7  Multiple Vitamin Oral Tablet; TAKE 1 TABLET DAILY; Therapy: (Recorded:2016) to Recorded   8  Nasonex 50 MCG/ACT Nasal Suspension; USE 2 SPRAYS IN EACH NOSTRIL ONCE DAILY; Last   Rx:2016 Ordered   9  PriLOSEC OTC 20 MG Oral Tablet Delayed Release; TAKE 1 TABLET DAILY; Therapy: 36PHR8312 to (Evaluate:2016); Last Rx:2016 Ordered   10  Valsartan 320 MG Oral Tablet; TAKE 1 TABLET DAILY; Therapy: 04LQC8487 to (Evaluate:2016); Last Rx:2016 Ordered   11  Vitamin D3 1000 UNIT Oral Tablet; 1 qd; Therapy: (Recorded:2016) to Recorded  Medication List Reviewed:    The medication list was reviewed and updated today  Allergies    1  FLU   2  ACE Inhibitors   3  Aspirin TABS   4  Zithromax TABS    5  Chocolate   6  Eggs    Vitals  Vital Signs [Data Includes: Current Encounter]    Recorded: 17LTJ6763 01:14PM   Heart Rate 72, L Radial   Pulse Quality Normal, L Radial   Systolic 231, LUE, Sitting   Diastolic 68, LUE, Sitting   BP Cuff Size Large   Weight 212 lb    BMI Calculated 42 82   BSA Calculated 1 89     Physical Exam    Constitutional   General appearance: Abnormal   comfortable and obese  Ears, Nose, Mouth, and Throat   Otoscopic examination: Tympanic membranes translucent with normal light reflex  Canals patent without erythema  Oropharynx: Normal with no erythema, edema, exudate or lesions  Pulmonary   Auscultation of lungs: Clear to auscultation  Cardiovascular   Auscultation of heart: Normal rate and rhythm, normal S1 and S2, without murmurs           Results/Data  Encounter Results   Falls Risk Assessment (Dx V80 09 Screen for Neurologic Disorder) 60PVR9524 01:18PM User, Ahs     Test Name Result Flag Reference   Falls Risk      No falls in the past year       Signatures   Electronically signed by : BRISA Gleason ; May  2 2016  1:34PM EST                       (Author)

## 2018-01-13 VITALS
SYSTOLIC BLOOD PRESSURE: 116 MMHG | HEART RATE: 64 BPM | DIASTOLIC BLOOD PRESSURE: 72 MMHG | BODY MASS INDEX: 43.75 KG/M2 | WEIGHT: 217.03 LBS | HEIGHT: 59 IN

## 2018-01-13 VITALS
DIASTOLIC BLOOD PRESSURE: 84 MMHG | BODY MASS INDEX: 43.55 KG/M2 | HEIGHT: 59 IN | SYSTOLIC BLOOD PRESSURE: 130 MMHG | WEIGHT: 216.03 LBS | TEMPERATURE: 98.5 F | HEART RATE: 60 BPM

## 2018-01-13 VITALS
BODY MASS INDEX: 43.37 KG/M2 | HEIGHT: 59 IN | SYSTOLIC BLOOD PRESSURE: 128 MMHG | HEART RATE: 88 BPM | WEIGHT: 215.13 LBS | TEMPERATURE: 98.2 F | DIASTOLIC BLOOD PRESSURE: 70 MMHG | RESPIRATION RATE: 14 BRPM

## 2018-01-13 VITALS
SYSTOLIC BLOOD PRESSURE: 138 MMHG | WEIGHT: 215.05 LBS | BODY MASS INDEX: 43.35 KG/M2 | HEIGHT: 59 IN | DIASTOLIC BLOOD PRESSURE: 92 MMHG | TEMPERATURE: 98.2 F | RESPIRATION RATE: 16 BRPM | HEART RATE: 80 BPM

## 2018-01-13 VITALS
BODY MASS INDEX: 43.95 KG/M2 | HEART RATE: 68 BPM | WEIGHT: 218 LBS | SYSTOLIC BLOOD PRESSURE: 124 MMHG | HEIGHT: 59 IN | DIASTOLIC BLOOD PRESSURE: 72 MMHG

## 2018-01-14 VITALS
HEIGHT: 59 IN | TEMPERATURE: 98.2 F | WEIGHT: 215 LBS | SYSTOLIC BLOOD PRESSURE: 145 MMHG | HEART RATE: 77 BPM | BODY MASS INDEX: 43.34 KG/M2 | RESPIRATION RATE: 14 BRPM | DIASTOLIC BLOOD PRESSURE: 66 MMHG

## 2018-01-14 VITALS
WEIGHT: 216.03 LBS | BODY MASS INDEX: 43.55 KG/M2 | HEART RATE: 60 BPM | DIASTOLIC BLOOD PRESSURE: 72 MMHG | SYSTOLIC BLOOD PRESSURE: 132 MMHG | HEIGHT: 59 IN

## 2018-01-15 NOTE — RESULT NOTES
Verified Results  (1) COMPREHENSIVE METABOLIC PANEL [id number] 56:52UZ Slade CONRAD Order Number: [de-identified]     Test Name Result Flag Reference   GLUCOSE,RANDM 95 mg/dL     If the patient is fasting, the ADA then defines impaired fasting glucose as > 100 mg/dL and diabetes as > or equal to 123 mg/dL  SODIUM 141 mmol/L  136-145   POTASSIUM 4 2 mmol/L  3 5-5 3   CHLORIDE 104 mmol/L  100-108   CARBON DIOXIDE 31 mmol/L  21-32   ANION GAP (CALC) 6 mmol/L  4-13   BLOOD UREA NITROGEN 20 mg/dL  5-25   CREATININE 0 82 mg/dL  0 60-1 30   Standardized to IDMS reference method   CALCIUM 9 1 mg/dL  8 3-10 1   BILI, TOTAL 0 50 mg/dL  0 20-1 00   ALK PHOSPHATAS 97 U/L     ALT (SGPT) 30 U/L  12-78   AST(SGOT) 28 U/L  5-45   ALBUMIN 3 3 g/dL L 3 5-5 0   TOTAL PROTEIN 6 8 g/dL  6 4-8 2   eGFR Non-African American      >60 0 ml/min/1 73sq [facility] Disease Education Program recommendations are as follows:  GFR calculation is accurate only with a steady state creatinine  Chronic Kidney disease less than 60 ml/min/1 73 sq  meters  Kidney failure less than 15 ml/min/1 73 sq  meters         Plan  HTN (hypertension)    · Atorvastatin Calcium 10 MG Oral Tablet (Lipitor); TAKE ONE TABLET BY MOUTH  ONCE DAILY

## 2018-01-18 NOTE — PROGRESS NOTES
Assessment    1  Postoperative examination (V67 00) (Z09)   2  Endometrial adenocarcinoma (182 0) (C54 1)   3  Morbid obesity with BMI of 45 0-49 9, adult (278 01,V85 42) (Q72 74,K04 41)     66year-old with a stage IB grade 1 endometrial cancer with lymphovascular space invasion and atypical cytology  She is recovering well from surgery  She is morbidly obese with a BMI of 46 kg per meter squared  Her performance status is 0  Plan   1  I discussed possible treatment plans for her high risk early stage endometrial cancer  Given that the deep myometrial invasion, the LVSI, and atypical glandular cells, she would be at risk for recurrence in the nodes as well as theoretically in the peritoneum  I therefore offered her treatment with 4 cycles of chemotherapy using carboplatin and Taxol along with vaginal brachytherapy to reduce her risk of local vaginal recurrence  Alternatively, pelvic radiation therapy could be considered, however, it may not address the risk of peritoneal failure  Vaginal brachytherapy alone could also be considered based on risk factors  After discussion at the gynecologic tumor conference, it was felt that her risk of extra pelvic failure was high enough to justify chemotherapy  She will consider all these options  I spent 25 minutes with the patient  More than half the time was spent in counseling and coordination of care regarding treatment for her high risk early stage endometrial cancer  Only a brief time was spent on the postoperative history and physical examination  Chief Complaint  Post-operative evaluation  Treatment discussion       Post-Op  Problem [facility]:   Stage IB grade 1 endometrial cancer with atypical cytology  Previous Therapy:   1  Robotic assisted total laparoscopic hysterectomy, bilateral salpingo-oophorectomy, bilateral pelvic lymph node dissection, excision of right pelvic peritoneum and repair of inherent vaginal laceration on January 5, 2016     A  19 of 23 mm depth of invasion  B  Positive lymphovascular space invasion  C  7 negative lymph nodes  D  Atypical peritoneal cytology with negative peritoneal biopsies    Free Text HPI: 68-year-old who underwent the above-mentioned surgery  Postoperatively, she experienced constipation and rectal pain due to hemorrhoids  She is ambulatory  The constipation has resolved  She has no vaginal bleeding or discharge  She is afebrile  She has normal bladder function  Postoperatively, her dose of metoprolol was reduced due to postoperative hypotension  She states that her blood pressures at home have been approximately 140/80  Active Problems    1  Allergic rhinitis (477 9) (J30 9)   2  Anxiety (300 00) (F41 9)   3  Cognitive deficits as late effect of cerebrovascular disease (438 0) (I69 91)   4  Constipation (564 00) (K59 00)   5  Endometrial adenocarcinoma (182 0) (C54 1)   6  Endometrial mass (625 8) (N94 89)   7  History of self breast exam   8  HTN (hypertension) (401 9) (I10)   9  Morbid obesity with BMI of 45 0-49 9, adult (278 01,V85 42) (E66 01,Z68 42)   10  Obesity (278 00) (E66 9)   11  Osteoarthrosis (715 90) (M19 90)   12  Postmenopausal vaginal bleeding (627 1) (N95 0)   13  Pure hypercholesterolemia (272 0) (E78 0)   14  Uterine carcinoma (179) (C55)   15  Vaginal bleeding, abnormal (623 8) (N93 9)    Social History    · Always uses seat belt   · Caffeine use (V49 89) (F15 90)   · CAFFEINE IN A M    · Cultural background   · NOT  OR    · Denied: History of Finding of    · Never a smoker   · No drug use   · Primary spoken language English   · Jew   · Racial background   · WHITE   · Single   · Social alcohol use (F10 99)    Current Meds   1  Aspirin Childrens 81 MG Oral Tablet Chewable; Take 1 tablet Monday, Wednesday, and   Friday; Therapy: (Recorded:2016) to Recorded   2  Calcium 600 + D 600-200 MG-UNIT Oral Tablet; TAKE 1 TABLET DAILY; Therapy: [id number] to Recorded   3  Docusate Sodium 100 MG Oral Capsule; TAKE 1 CAPSULE TWICE DAILY AS NEEDED; Therapy: [id number] to Recorded   4  Lipitor 10 MG Oral Tablet; TAKE 1 TABLET DAILY; Therapy: 57PWQ3162 to (Evaluate:25Jun2016) Recorded   5  LORazepam 1 MG Oral Tablet; 1 tablet daily  and an additional 1/2 tab prn; Last   Rx:13Nov2015 Ordered   6  Metoprolol Succinate ER 25 MG Oral Tablet Extended Release 24 Hour; TAKE 1/2   TABLET EVERY DAY; Therapy: [id number] to (Evaluate:02Jan2017)  Requested for: [id number] Recorded   7  Multiple Vitamin Oral Tablet; TAKE 1 TABLET DAILY; Therapy: (Recorded:08Jan2016) to Recorded   8  Nasonex 50 MCG/ACT Nasal Suspension; USE 2 SPRAYS IN EACH NOSTRIL ONCE   DAILY; Last Rx:05Nov2014 Ordered   9  PriLOSEC OTC 20 MG Oral Tablet Delayed Release; TAKE 1 TABLET DAILY; Therapy: 67LPH0231 to Recorded   10  Valsartan 320 MG Oral Tablet; TAKE 1 TABLET DAILY; Therapy: [id number] to Recorded   11  Vitamin D3 1000 UNIT Oral Tablet; 1 qd; Therapy: (Recorded:08Jan2016) to Recorded    Allergies    1  FLU   2  ACE Inhibitors   3  Aspirin TABS   4  Zithromax TABS    5  Chocolate   6  Eggs    Vitals   Recorded: [id number] 02:01PM   Heart Rate 92   Respiration 20   Systolic 624   Diastolic 80   Height 4 ft 11 in   Weight 226 lb    BMI Calculated 45 65   BSA Calculated 1 94     Physical Exam    Constitutional   General appearance: No acute distress, well appearing and well nourished  Pulmonary   Respiratory effort: No increased work of breathing or signs of respiratory distress  Abdomen   Abdomen: Normal, soft, non-tender, and no organomegaly noted  Examination for hernias: No hernias appreciated  Skin   Skin and subcutaneous tissue: Abnormal   Ecchymoses present in 2 of the trocar sites  The trocar sites are healing well  There are clean dry and intact     Palpation of skin and subcutaneous tissue: Normal     Psychiatric   Orientation to person, place, and time: Normal     Mood and affect: Normal  GOG performance status is: 0      Future Appointments    Date/Time Provider Specialty Site   03/02/2016 01:00 PM BRISA Kam   Internal Medicine [facility] INTERNAL MED   02/17/2016 02:00 PM Nnea Richardson MD Gynecological Oncology [facility]     Signatures   Electronically signed by : Mirian Gibson MD; Jan 20 2016  5:42PM EST                       (Author)

## 2018-01-22 VITALS
WEIGHT: 216.38 LBS | TEMPERATURE: 97.7 F | DIASTOLIC BLOOD PRESSURE: 88 MMHG | SYSTOLIC BLOOD PRESSURE: 140 MMHG | RESPIRATION RATE: 12 BRPM | HEIGHT: 59 IN | BODY MASS INDEX: 43.62 KG/M2 | HEART RATE: 72 BPM

## 2018-01-24 VITALS
WEIGHT: 213 LBS | RESPIRATION RATE: 14 BRPM | HEART RATE: 87 BPM | BODY MASS INDEX: 42.94 KG/M2 | DIASTOLIC BLOOD PRESSURE: 68 MMHG | HEIGHT: 59 IN | SYSTOLIC BLOOD PRESSURE: 120 MMHG

## 2018-02-01 ENCOUNTER — LAB (OUTPATIENT)
Dept: LAB | Facility: HOSPITAL | Age: 78
End: 2018-02-01
Attending: OBSTETRICS & GYNECOLOGY
Payer: MEDICARE

## 2018-02-01 DIAGNOSIS — I10 ESSENTIAL (PRIMARY) HYPERTENSION: ICD-10-CM

## 2018-02-01 DIAGNOSIS — C54.1 MALIGNANT NEOPLASM OF ENDOMETRIUM (HCC): ICD-10-CM

## 2018-02-01 LAB
ALBUMIN SERPL BCP-MCNC: 3.3 G/DL (ref 3.5–5)
ALP SERPL-CCNC: 88 U/L (ref 46–116)
ALT SERPL W P-5'-P-CCNC: 25 U/L (ref 12–78)
ANION GAP SERPL CALCULATED.3IONS-SCNC: 7 MMOL/L (ref 4–13)
AST SERPL W P-5'-P-CCNC: 35 U/L (ref 5–45)
BILIRUB SERPL-MCNC: 0.5 MG/DL (ref 0.2–1)
BUN SERPL-MCNC: 25 MG/DL (ref 5–25)
CALCIUM SERPL-MCNC: 8.9 MG/DL (ref 8.3–10.1)
CHLORIDE SERPL-SCNC: 104 MMOL/L (ref 100–108)
CHOLEST SERPL-MCNC: 172 MG/DL (ref 50–200)
CO2 SERPL-SCNC: 29 MMOL/L (ref 21–32)
CREAT SERPL-MCNC: 0.96 MG/DL (ref 0.6–1.3)
ERYTHROCYTE [DISTWIDTH] IN BLOOD BY AUTOMATED COUNT: 16.5 % (ref 11.6–15.1)
GFR SERPL CREATININE-BSD FRML MDRD: 56 ML/MIN/1.73SQ M
GLUCOSE P FAST SERPL-MCNC: 107 MG/DL (ref 65–99)
HCT VFR BLD AUTO: 30.6 % (ref 34.8–46.1)
HDLC SERPL-MCNC: 86 MG/DL (ref 40–60)
HGB BLD-MCNC: 8.7 G/DL (ref 11.5–15.4)
LDLC SERPL CALC-MCNC: 75 MG/DL (ref 0–100)
MCH RBC QN AUTO: 22.4 PG (ref 26.8–34.3)
MCHC RBC AUTO-ENTMCNC: 28.4 G/DL (ref 31.4–37.4)
MCV RBC AUTO: 79 FL (ref 82–98)
PLATELET # BLD AUTO: 264 THOUSANDS/UL (ref 149–390)
PMV BLD AUTO: 10.3 FL (ref 8.9–12.7)
POTASSIUM SERPL-SCNC: 4.2 MMOL/L (ref 3.5–5.3)
PROT SERPL-MCNC: 7 G/DL (ref 6.4–8.2)
RBC # BLD AUTO: 3.89 MILLION/UL (ref 3.81–5.12)
SODIUM SERPL-SCNC: 140 MMOL/L (ref 136–145)
TRIGL SERPL-MCNC: 56 MG/DL
TSH SERPL DL<=0.05 MIU/L-ACNC: 3.49 UIU/ML (ref 0.36–3.74)
WBC # BLD AUTO: 5.29 THOUSAND/UL (ref 4.31–10.16)

## 2018-02-01 PROCEDURE — 80053 COMPREHEN METABOLIC PANEL: CPT

## 2018-02-01 PROCEDURE — 84443 ASSAY THYROID STIM HORMONE: CPT

## 2018-02-01 PROCEDURE — 36415 COLL VENOUS BLD VENIPUNCTURE: CPT

## 2018-02-01 PROCEDURE — 85027 COMPLETE CBC AUTOMATED: CPT

## 2018-02-01 PROCEDURE — 80061 LIPID PANEL: CPT

## 2018-02-02 DIAGNOSIS — R73.01 ELEVATED FASTING BLOOD SUGAR: Primary | ICD-10-CM

## 2018-02-02 DIAGNOSIS — D50.9 IRON DEFICIENCY ANEMIA, UNSPECIFIED IRON DEFICIENCY ANEMIA TYPE: ICD-10-CM

## 2018-02-02 NOTE — PROGRESS NOTES
Call patient: Labs are abnormal: fasting blood sugar is higher and pt developed anemia   I need to check a hga1c for impaired fasting glucose and get iron saturation, ferritin, reticulocyte levels and stool for heme x3 for evaluation of anemia

## 2018-02-06 ENCOUNTER — HOSPITAL ENCOUNTER (OUTPATIENT)
Dept: CT IMAGING | Facility: HOSPITAL | Age: 78
Discharge: HOME | End: 2018-02-06
Attending: OBSTETRICS & GYNECOLOGY
Payer: MEDICARE

## 2018-02-06 DIAGNOSIS — C54.1 MALIGNANT NEOPLASM OF ENDOMETRIUM (HCC): ICD-10-CM

## 2018-02-06 PROCEDURE — 71260 CT THORAX DX C+: CPT

## 2018-02-06 PROCEDURE — 74177 CT ABD & PELVIS W/CONTRAST: CPT

## 2018-02-06 RX ADMIN — IOHEXOL 100 ML: 350 INJECTION, SOLUTION INTRAVENOUS at 10:21

## 2018-02-08 ENCOUNTER — LAB (OUTPATIENT)
Dept: LAB | Facility: HOSPITAL | Age: 78
End: 2018-02-08
Attending: INTERNAL MEDICINE
Payer: MEDICARE

## 2018-02-08 DIAGNOSIS — D50.9 IRON DEFICIENCY ANEMIA, UNSPECIFIED IRON DEFICIENCY ANEMIA TYPE: ICD-10-CM

## 2018-02-08 DIAGNOSIS — R73.01 ELEVATED FASTING BLOOD SUGAR: ICD-10-CM

## 2018-02-08 LAB
EST. AVERAGE GLUCOSE BLD GHB EST-MCNC: 103 MG/DL
FERRITIN SERPL-MCNC: 7 NG/ML (ref 8–388)
HBA1C MFR BLD: 5.2 % (ref 4.2–6.3)
RETICS # AUTO: NORMAL 10*3/UL (ref 14097–95744)
RETICS # CALC: 1.36 % (ref 0.37–1.87)
TIBC SERPL-MCNC: 459 UG/DL (ref 250–450)

## 2018-02-08 PROCEDURE — 36415 COLL VENOUS BLD VENIPUNCTURE: CPT

## 2018-02-08 PROCEDURE — 82728 ASSAY OF FERRITIN: CPT

## 2018-02-08 PROCEDURE — 83550 IRON BINDING TEST: CPT

## 2018-02-08 PROCEDURE — 85045 AUTOMATED RETICULOCYTE COUNT: CPT

## 2018-02-08 PROCEDURE — 83036 HEMOGLOBIN GLYCOSYLATED A1C: CPT

## 2018-02-09 NOTE — PROGRESS NOTES
Call patient: Labs show that patient's blood sugars are controlled  She does have iron deficiency anemia  Her iron levels are very low    I would like to refer her to Dr Gerardo Limon and test her stool for blood Hemoccult x3

## 2018-02-14 PROBLEM — C54.1 ENDOMETRIAL CANCER (HCC): Status: ACTIVE | Noted: 2018-02-14

## 2018-02-15 DIAGNOSIS — C54.1 MALIGNANT NEOPLASM OF ENDOMETRIUM (HCC): ICD-10-CM

## 2018-02-21 ENCOUNTER — OFFICE VISIT (OUTPATIENT)
Dept: GYNECOLOGIC ONCOLOGY | Facility: HOSPITAL | Age: 78
End: 2018-02-21
Payer: MEDICARE

## 2018-02-21 VITALS
BODY MASS INDEX: 44.17 KG/M2 | HEART RATE: 91 BPM | DIASTOLIC BLOOD PRESSURE: 80 MMHG | WEIGHT: 210.4 LBS | TEMPERATURE: 98.3 F | RESPIRATION RATE: 16 BRPM | SYSTOLIC BLOOD PRESSURE: 138 MMHG | HEIGHT: 58 IN

## 2018-02-21 DIAGNOSIS — D50.8 OTHER IRON DEFICIENCY ANEMIA: ICD-10-CM

## 2018-02-21 DIAGNOSIS — C54.1 ENDOMETRIAL CANCER (HCC): Primary | ICD-10-CM

## 2018-02-21 PROBLEM — D50.9 IRON DEFICIENCY ANEMIA: Status: ACTIVE | Noted: 2018-02-21

## 2018-02-21 PROBLEM — I10 ESSENTIAL HYPERTENSION: Status: ACTIVE | Noted: 2018-01-11

## 2018-02-21 PROCEDURE — 99214 OFFICE O/P EST MOD 30 MIN: CPT | Performed by: OBSTETRICS & GYNECOLOGY

## 2018-02-21 NOTE — PROGRESS NOTES
Assessment/Plan:    Problem List Items Addressed This Visit        Genitourinary    Endometrial cancer ([facility] ) - Primary       68-year-old with stage IB grade 1 endometrial cancer with LVSI and atypical cytology  She is clinically and radiologically without evidence of disease recurrence  Her performance status is 0   1  Return in 6 months for endometrial cancer surveillance            Other    Iron deficiency anemia     Repeat CBC as her hgb last year was 12 6  Will also refer for colonoscopy         Relevant Orders    CBC and differential    Ambulatory referral to Colorectal Surgery            CHIEF COMPLAINT:       Problem:  Endometrial cancer ([facility] )    Staging form: Corpus Uteri - Carcinoma, AJCC 8th Edition    - Clinical: FIGO Stage IB - Signed by Nitza Armstrong PA-C on 2/14/2018      Previous therapy:     Endometrial cancer ([facility] )    1/5/2016 Initial Diagnosis     Endometrial cancer ([facility] )         1/5/2016 Surgery     Robotic assisted total laparoscopic hysterectomy, bilateral salpingo-oophorectomy, bilateral pelvic lymph node dissection, excision of right pelvic peritoneum and repair of inherent vaginal laceration   A  19 of 23 mm depth of invasion, positive LVSI, 7 negative lymph nodes  B  Atypical peritoneal cytology with negative peritoneal biopsies            - 4/1/2016 Chemotherapy     Taxol 135 mg/m2 and carboplatin AUC 5 every 21 days  She completed 4 cycles  - 5/3/2016 Radiation     Vaginal brachytherapy              Patient ID: Jeremiah Wright is a 76 y o  female  67yo returns for endometrial cancer surveillance  She was recently diagnosed with iron deficiency anemia  She does not note any blood in the stool, black stool, vaginal bleeding  No pelvic pain  Her quality of life is otherwise good  CT of  The chest abdomen pelvis on 2/6/2018 did not reveal any evidence of endometrial cancer recurrence  She has a stable thoracic aneurysm measuring 4 1 centimeters    No other interval change in her medications or medical history  The following portions of the patient's history were reviewed and updated as appropriate: allergies, current medications and problem list     Review of Systems   Psychiatric/Behavioral: Positive for agitation  The patient is nervous/anxious  All other systems reviewed and are negative  Current Outpatient Prescriptions   Medication Sig Dispense Refill    aspirin 81 MG tablet Take 81 mg by mouth 3 (three) times a week   atorvastatin (LIPITOR) 10 mg tablet Take 10 mg by mouth daily   Calcium Carb-Cholecalciferol (CALCIUM 600 + D PO) Take 1 capsule by mouth daily   Cholecalciferol (VITAMIN D-3) 1000 UNITS CAPS Take 1 tablet by mouth daily   LORazepam (ATIVAN) 0 5 mg tablet Take 0 5 mg by mouth daily as needed for anxiety   LORazepam (ATIVAN) 1 mg tablet Take 1 mg by mouth daily at bedtime   metoprolol succinate (TOPROL-XL) 25 mg 24 hr tablet Take 12 5 mg by mouth daily   mometasone (NASONEX) 50 mcg/act nasal spray 2 sprays into each nostril daily as needed   multivitamin (THERAGRAN) TABS Take 1 tablet by mouth daily   valsartan (DIOVAN) 320 MG tablet Take 320 mg by mouth daily   acetaminophen (TYLENOL) 325 mg tablet Take 650 mg by mouth every 4 (four) hours as needed for mild pain   docusate sodium (COLACE) 100 mg capsule Take 100 mg by mouth 2 (two) times a day   omeprazole (PriLOSEC) 20 mg delayed release capsule Take 20 mg by mouth daily  No current facility-administered medications for this visit  Objective:    Blood pressure 138/80, pulse 91, temperature 98 3 °F (36 8 °C), temperature source Oral, resp  rate 16, height 4' 10 47" (1 485 m), weight 95 4 kg (210 lb 6 4 oz)  Physical Exam   Constitutional: She is oriented to person, place, and time  She appears well-developed and well-nourished  No distress  HENT:   Head: Normocephalic and atraumatic     Neck: Normal range of motion  Neck supple  No thyromegaly present  Abdominal: Soft  She exhibits no distension and no mass  There is no tenderness  There is no rebound and no guarding  Genitourinary:   Genitourinary Comments: The uterus, cervix, and adnexa are surgically absent  Vagina without masses, lesions, bleeding, apex is mobile   Musculoskeletal: She exhibits no edema  Lymphadenopathy:     She has no cervical adenopathy  Neurological: She is alert and oriented to person, place, and time  Skin: Skin is warm and dry  She is not diaphoretic  Psychiatric: She has a normal mood and affect   Her behavior is normal  Judgment and thought content normal        No results found for:   Lab Results   Component Value Date     02/01/2018    K 4 2 02/01/2018     02/01/2018    CO2 29 02/01/2018    ANIONGAP 7 02/01/2018    BUN 25 02/01/2018    CREATININE 0 96 02/01/2018    GLUCOSE 92 02/01/2017    GLUF 107 (H) 02/01/2018    CALCIUM 8 9 02/01/2018    AST 35 02/01/2018    ALT 25 02/01/2018    ALKPHOS 88 02/01/2018    PROT 7 0 02/01/2018    BILITOT 0 50 02/01/2018    EGFR 56 02/01/2018     Lab Results   Component Value Date    WBC 5 29 02/01/2018    HGB 8 7 (L) 02/01/2018    HCT 30 6 (L) 02/01/2018    MCV 79 (L) 02/01/2018     02/01/2018     Lab Results   Component Value Date    NEUTROABS 1 83 (L) 04/26/2016

## 2018-02-21 NOTE — ASSESSMENT & PLAN NOTE
78-year-old with stage IB grade 1 endometrial cancer with LVSI and atypical cytology  She is clinically and radiologically without evidence of disease recurrence    Her performance status is 0   1  Return in 6 months for endometrial cancer surveillance

## 2018-02-22 ENCOUNTER — OFFICE VISIT (OUTPATIENT)
Dept: FAMILY MEDICINE CLINIC | Facility: HOSPITAL | Age: 78
End: 2018-02-22
Payer: MEDICARE

## 2018-02-22 VITALS
DIASTOLIC BLOOD PRESSURE: 68 MMHG | HEIGHT: 59 IN | BODY MASS INDEX: 41.93 KG/M2 | HEART RATE: 78 BPM | WEIGHT: 208 LBS | SYSTOLIC BLOOD PRESSURE: 120 MMHG | RESPIRATION RATE: 16 BRPM

## 2018-02-22 DIAGNOSIS — D50.8 OTHER IRON DEFICIENCY ANEMIA: Primary | ICD-10-CM

## 2018-02-22 DIAGNOSIS — K21.9 GASTROESOPHAGEAL REFLUX DISEASE WITHOUT ESOPHAGITIS: ICD-10-CM

## 2018-02-22 DIAGNOSIS — D64.9 LOW HEMOGLOBIN AND LOW HEMATOCRIT: ICD-10-CM

## 2018-02-22 DIAGNOSIS — I10 ESSENTIAL HYPERTENSION: ICD-10-CM

## 2018-02-22 LAB — SL AMB POCT FECES OCC BLD: NORMAL

## 2018-02-22 PROCEDURE — 82270 OCCULT BLOOD FECES: CPT | Performed by: INTERNAL MEDICINE

## 2018-02-22 PROCEDURE — 99214 OFFICE O/P EST MOD 30 MIN: CPT | Performed by: INTERNAL MEDICINE

## 2018-02-22 RX ORDER — FERROUS SULFATE TAB EC 324 MG (65 MG FE EQUIVALENT) 324 (65 FE) MG
324 TABLET DELAYED RESPONSE ORAL
Qty: 30 TABLET | Refills: 5 | Status: SHIPPED | OUTPATIENT
Start: 2018-02-22 | End: 2018-07-09 | Stop reason: SDUPTHER

## 2018-02-22 NOTE — ASSESSMENT & PLAN NOTE
hgb dropped to 8 7 from 12 over a year, denies signs of gi bleed, stool cards are heme negativex3, will see GI for colonoscopy  Pt has iron deficiency    Pt prefers to start iron replacement orally and not in the IV form first!

## 2018-02-22 NOTE — PROGRESS NOTES
Assessment/Plan:    Iron deficiency anemia  hgb dropped to 8 7 from 12 over a year, denies signs of gi bleed, stool cards are heme negativex3, will see GI for colonoscopy  Pt has iron deficiency  Pt prefers to start iron replacement orally and not in the IV form first!    Essential hypertension  Stable on metoprolol and diovan    Gastroesophageal reflux disease without esophagitis  Takes pepcid prn, stable overall       Diagnoses and all orders for this visit:    Other iron deficiency anemia  -     ferrous sulfate 324 (65 Fe) mg; Take 1 tablet (324 mg total) by mouth daily before breakfast  -     CBC; Future  -     Iron; Future  -     Iron Saturation %; Future  -     Ferritin; Future    Gastroesophageal reflux disease without esophagitis    Essential hypertension      Please stop your aspirin for the time being until we find out if you have any source for GI bleed! Subjective:      Patient ID: Macario Yost is a 76 y o  female  Anemia   Presents for initial visit  There has been no abdominal pain, anorexia, bruising/bleeding easily, fever, light-headedness, malaise/fatigue, palpitations or weight loss  Signs of blood loss that are not present include hematemesis, hematochezia, melena and vaginal bleeding  Past treatments include nothing  There is no history of chronic liver disease or chronic renal disease  Family history includes iron deficiency  The following portions of the patient's history were reviewed and updated as appropriate: current medications, past family history, past medical history, past social history, past surgical history and problem list     Review of Systems   Constitutional: Negative for fever, malaise/fatigue and weight loss  HENT: Negative for congestion  Respiratory: Negative for cough, shortness of breath and wheezing  Cardiovascular: Negative for chest pain, palpitations and leg swelling     Gastrointestinal: Negative for abdominal pain, anorexia, blood in stool, diarrhea, hematemesis, hematochezia, melena, nausea and vomiting  Endocrine: Negative for polydipsia and polyphagia  Genitourinary: Negative for difficulty urinating, dysuria, hematuria and vaginal bleeding  Musculoskeletal: Negative for joint swelling and neck stiffness  Skin: Negative for rash  Neurological: Negative for weakness, light-headedness, numbness and headaches  Hematological: Does not bruise/bleed easily  Psychiatric/Behavioral: Negative for dysphoric mood  All other systems reviewed and are negative  Objective:    /68   Pulse 78   Resp 16   Ht 4' 10 5" (1 486 m)   Wt 94 3 kg (208 lb)   BMI 42 73 kg/m²      Physical Exam   Constitutional: She appears well-developed  No distress  HENT:   Head: Normocephalic  Eyes: Conjunctivae are normal    Neck: Neck supple  Cardiovascular: Normal rate and regular rhythm  Pulmonary/Chest: Effort normal  No respiratory distress  She has no wheezes  She has no rales  Abdominal: Soft  Bowel sounds are normal  She exhibits no distension  There is no tenderness  Musculoskeletal: She exhibits no tenderness  Neurological: She is alert  Skin: Skin is warm and dry  No rash noted  Psychiatric: She has a normal mood and affect             Dustin Jones MD

## 2018-02-26 ENCOUNTER — LAB (OUTPATIENT)
Dept: LAB | Facility: HOSPITAL | Age: 78
End: 2018-02-26
Attending: OBSTETRICS & GYNECOLOGY
Payer: MEDICARE

## 2018-02-26 DIAGNOSIS — D50.8 OTHER IRON DEFICIENCY ANEMIA: ICD-10-CM

## 2018-02-26 LAB
BASOPHILS # BLD AUTO: 0.02 THOUSANDS/ΜL (ref 0–0.1)
BASOPHILS NFR BLD AUTO: 1 % (ref 0–1)
EOSINOPHIL # BLD AUTO: 0.13 THOUSAND/ΜL (ref 0–0.61)
EOSINOPHIL NFR BLD AUTO: 3 % (ref 0–6)
ERYTHROCYTE [DISTWIDTH] IN BLOOD BY AUTOMATED COUNT: 17.4 % (ref 11.6–15.1)
HCT VFR BLD AUTO: 29.6 % (ref 34.8–46.1)
HGB BLD-MCNC: 8.6 G/DL (ref 11.5–15.4)
LYMPHOCYTES # BLD AUTO: 1.5 THOUSANDS/ΜL (ref 0.6–4.47)
LYMPHOCYTES NFR BLD AUTO: 36 % (ref 14–44)
MCH RBC QN AUTO: 22.5 PG (ref 26.8–34.3)
MCHC RBC AUTO-ENTMCNC: 29.1 G/DL (ref 31.4–37.4)
MCV RBC AUTO: 77 FL (ref 82–98)
MONOCYTES # BLD AUTO: 0.43 THOUSAND/ΜL (ref 0.17–1.22)
MONOCYTES NFR BLD AUTO: 10 % (ref 4–12)
NEUTROPHILS # BLD AUTO: 2.09 THOUSANDS/ΜL (ref 1.85–7.62)
NEUTS SEG NFR BLD AUTO: 50 % (ref 43–75)
PLATELET # BLD AUTO: 331 THOUSANDS/UL (ref 149–390)
PMV BLD AUTO: 10.6 FL (ref 8.9–12.7)
RBC # BLD AUTO: 3.83 MILLION/UL (ref 3.81–5.12)
WBC # BLD AUTO: 4.17 THOUSAND/UL (ref 4.31–10.16)

## 2018-02-26 PROCEDURE — 36415 COLL VENOUS BLD VENIPUNCTURE: CPT

## 2018-02-26 PROCEDURE — 85025 COMPLETE CBC W/AUTO DIFF WBC: CPT

## 2018-03-12 DIAGNOSIS — J30.9 CHRONIC ALLERGIC RHINITIS, UNSPECIFIED SEASONALITY, UNSPECIFIED TRIGGER: Primary | ICD-10-CM

## 2018-03-13 ENCOUNTER — PATIENT OUTREACH (OUTPATIENT)
Dept: OTHER | Facility: HOSPITAL | Age: 78
End: 2018-03-13

## 2018-03-13 ENCOUNTER — TELEPHONE (OUTPATIENT)
Dept: OTHER | Facility: HOSPITAL | Age: 78
End: 2018-03-13

## 2018-03-13 RX ORDER — MOMETASONE FUROATE 50 UG/1
SPRAY, METERED NASAL
Qty: 1 ACT | Refills: 2 | Status: SHIPPED | OUTPATIENT
Start: 2018-03-13 | End: 2018-03-14 | Stop reason: SDUPTHER

## 2018-03-14 ENCOUNTER — TELEPHONE (OUTPATIENT)
Dept: FAMILY MEDICINE CLINIC | Facility: HOSPITAL | Age: 78
End: 2018-03-14

## 2018-03-14 DIAGNOSIS — J30.9 CHRONIC ALLERGIC RHINITIS, UNSPECIFIED SEASONALITY, UNSPECIFIED TRIGGER: ICD-10-CM

## 2018-03-14 DIAGNOSIS — D50.9 IRON DEFICIENCY ANEMIA, UNSPECIFIED IRON DEFICIENCY ANEMIA TYPE: Primary | ICD-10-CM

## 2018-03-14 NOTE — TELEPHONE ENCOUNTER
L/m for pt to call back in reference to letter pt sent  , put order in for  [facility]  , sent rx to [facility] for sign off DD

## 2018-03-15 ENCOUNTER — TELEPHONE (OUTPATIENT)
Dept: FAMILY MEDICINE CLINIC | Facility: HOSPITAL | Age: 78
End: 2018-03-15

## 2018-03-16 NOTE — PROGRESS NOTES
I have reviewed the notes, assessments, and/or procedures , I CONCUR with her/his documentation of Jayy Gaspar

## 2018-03-18 RX ORDER — MOMETASONE FUROATE 50 UG/1
2 SPRAY, METERED NASAL DAILY
Qty: 1 ACT | Refills: 5 | Status: SHIPPED | OUTPATIENT
Start: 2018-03-18 | End: 2019-04-11 | Stop reason: SDUPTHER

## 2018-03-22 ENCOUNTER — LAB (OUTPATIENT)
Dept: LAB | Facility: HOSPITAL | Age: 78
End: 2018-03-22
Attending: INTERNAL MEDICINE
Payer: MEDICARE

## 2018-03-22 DIAGNOSIS — D50.8 OTHER IRON DEFICIENCY ANEMIA: ICD-10-CM

## 2018-03-22 LAB
ERYTHROCYTE [DISTWIDTH] IN BLOOD BY AUTOMATED COUNT: 22.7 % (ref 11.6–15.1)
FERRITIN SERPL-MCNC: 15 NG/ML (ref 8–388)
HCT VFR BLD AUTO: 35.4 % (ref 34.8–46.1)
HGB BLD-MCNC: 9.9 G/DL (ref 11.5–15.4)
IRON SATN MFR SERPL: 9 %
IRON SERPL-MCNC: 35 UG/DL (ref 50–170)
MCH RBC QN AUTO: 22.6 PG (ref 26.8–34.3)
MCHC RBC AUTO-ENTMCNC: 28 G/DL (ref 31.4–37.4)
MCV RBC AUTO: 81 FL (ref 82–98)
PLATELET # BLD AUTO: 259 THOUSANDS/UL (ref 149–390)
PMV BLD AUTO: 10 FL (ref 8.9–12.7)
RBC # BLD AUTO: 4.38 MILLION/UL (ref 3.81–5.12)
TIBC SERPL-MCNC: 409 UG/DL (ref 250–450)
WBC # BLD AUTO: 4.56 THOUSAND/UL (ref 4.31–10.16)

## 2018-03-22 PROCEDURE — 85027 COMPLETE CBC AUTOMATED: CPT

## 2018-03-22 PROCEDURE — 83550 IRON BINDING TEST: CPT

## 2018-03-22 PROCEDURE — 82728 ASSAY OF FERRITIN: CPT

## 2018-03-22 PROCEDURE — 83540 ASSAY OF IRON: CPT

## 2018-03-22 PROCEDURE — 36415 COLL VENOUS BLD VENIPUNCTURE: CPT

## 2018-03-22 NOTE — PROGRESS NOTES
Call patient: Labs Shows slight  Improvement in iron levels continue taking iron sulfate  Red blood cell counts are also slightly higher

## 2018-04-09 ENCOUNTER — OFFICE VISIT (OUTPATIENT)
Dept: FAMILY MEDICINE CLINIC | Facility: HOSPITAL | Age: 78
End: 2018-04-09
Payer: MEDICARE

## 2018-04-09 VITALS
WEIGHT: 212 LBS | HEIGHT: 59 IN | BODY MASS INDEX: 42.74 KG/M2 | RESPIRATION RATE: 16 BRPM | HEART RATE: 66 BPM | DIASTOLIC BLOOD PRESSURE: 78 MMHG | SYSTOLIC BLOOD PRESSURE: 122 MMHG

## 2018-04-09 DIAGNOSIS — I71.2 ASCENDING AORTIC ANEURYSM (HCC): ICD-10-CM

## 2018-04-09 DIAGNOSIS — I10 ESSENTIAL HYPERTENSION: ICD-10-CM

## 2018-04-09 DIAGNOSIS — Z00.00 MEDICARE ANNUAL WELLNESS VISIT, SUBSEQUENT: Primary | ICD-10-CM

## 2018-04-09 DIAGNOSIS — K21.9 GASTROESOPHAGEAL REFLUX DISEASE WITHOUT ESOPHAGITIS: ICD-10-CM

## 2018-04-09 DIAGNOSIS — D50.8 OTHER IRON DEFICIENCY ANEMIA: ICD-10-CM

## 2018-04-09 PROBLEM — I71.21 ASCENDING AORTIC ANEURYSM: Status: ACTIVE | Noted: 2018-04-09

## 2018-04-09 PROCEDURE — 99213 OFFICE O/P EST LOW 20 MIN: CPT | Performed by: INTERNAL MEDICINE

## 2018-04-09 PROCEDURE — G0439 PPPS, SUBSEQ VISIT: HCPCS | Performed by: INTERNAL MEDICINE

## 2018-04-09 NOTE — PROGRESS NOTES
Assessment/Plan:    Ascending aortic aneurysm [facility])  Denies chest pain, will recheck CT chest next February    Iron deficiency anemia  hgb is increasing, now above 9, pt's to see Hematology in 2 weeks  Gastroesophageal reflux disease without esophagitis  Pt's GERD was better controlled on prilosec than pepcid that she's on now  Essential hypertension  Continue diovan and toprol       Diagnoses and all orders for this visit:    Medicare annual wellness visit, subsequent    Essential hypertension  -     Basic metabolic panel; Future    Gastroesophageal reflux disease without esophagitis    Ascending aortic aneurysm (HCC)    Other iron deficiency anemia  -     CBC; Future  -     Iron Saturation %; Future  -     Ferritin; Future          Subjective:      Patient ID: Zafar Ye is a 76 y o  female  HPI      The following portions of the patient's history were reviewed and updated as appropriate: current medications, past family history, past medical history, past social history, past surgical history and problem list     Review of Systems   Constitutional: Negative for fever  HENT: Negative for congestion  Eyes: Negative for visual disturbance  Respiratory: Negative for cough and shortness of breath  Cardiovascular: Negative for chest pain, palpitations and leg swelling  Gastrointestinal: Negative for abdominal pain, blood in stool and diarrhea  Endocrine: Negative for polydipsia and polyphagia  Genitourinary: Negative for difficulty urinating and dysuria  Musculoskeletal: Negative for joint swelling, myalgias and neck stiffness  Skin: Negative for rash  Neurological: Negative for weakness, numbness and headaches  Hematological: Negative for adenopathy  Psychiatric/Behavioral: Negative for dysphoric mood  All other systems reviewed and are negative          Objective:    /78   Pulse 66   Resp 16   Ht 4' 10 5" (1 486 m)   Wt 96 2 kg (212 lb)   BMI 43 55 kg/m² Physical Exam   Constitutional: She appears well-developed  No distress  HENT:   Head: Normocephalic  Eyes: Conjunctivae are normal    Neck: Neck supple  Cardiovascular: Normal rate and regular rhythm  Pulmonary/Chest: Effort normal  No respiratory distress  She has no wheezes  She has no rales  Abdominal: Soft  Bowel sounds are normal  She exhibits no distension  There is no tenderness  Musculoskeletal: She exhibits no tenderness  Neurological: She is alert  No cranial nerve deficit  Skin: Skin is warm and dry  No rash noted  Psychiatric: She has a normal mood and affect  Vitals reviewed            Lobo Loza MD

## 2018-04-09 NOTE — PROGRESS NOTES
HPI:  Kinsey Vazquez is a 76 y o  female here for her Subsequent Wellness Visit  Patient Active Problem List   Diagnosis    Endometrial cancer ([place] [address] )    Iron deficiency anemia    Essential hypertension    Gastroesophageal reflux disease without esophagitis    Other hyperlipidemia    Morbid obesity with BMI of 45 0-49 9, adult ([place] [address] )    Allergic rhinitis    Anxiety    Ascending aortic aneurysm (HCC)     Past Medical History:   Diagnosis Date    Anxiety     Cataract     CVA (cerebrovascular accident) ([place] [address] )     Depression     GERD (gastroesophageal reflux disease)     Hearing loss on left     Hypertension     Morbid obesity ([place] [address] )     Osteoarthritis     Retinopathy     TIA (transient ischemic attack)     2001    Uterine cancer ([place] [address] )      Past Surgical History:   Procedure Laterality Date    EYE SURGERY Left 10/2016    HYSTERECTOMY      LYMPH NODE BIOPSY      OOPHORECTOMY Bilateral     PELVIC LAPAROSCOPY       Family History   Problem Relation Age of Onset    Breast cancer Sister     Prostate cancer Brother     Heart failure Mother     Anemia Mother     Alzheimer's disease Father     Substance Abuse Neg Hx      neg fam hx    Mental illness Neg Hx      neg fam hx     History   Smoking Status    Never Smoker   Smokeless Tobacco    Never Used     History   Alcohol Use    Yes     Comment: On occasion      History   Drug Use No     /70   Pulse 66   Resp 16   Ht 4' 10 5" (1 486 m)   Wt 96 2 kg (212 lb)   BMI 43 55 kg/m²       Current Outpatient Prescriptions   Medication Sig Dispense Refill    acetaminophen (TYLENOL) 325 mg tablet Take 650 mg by mouth every 4 (four) hours as needed for mild pain   atorvastatin (LIPITOR) 10 mg tablet Take 10 mg by mouth daily   Calcium Carb-Cholecalciferol (CALCIUM 600 + D PO) Take 1 capsule by mouth daily   Cholecalciferol (VITAMIN D-3) 1000 UNITS CAPS Take 1 tablet by mouth daily        ferrous sulfate 324 (65 Fe) mg Take 1 tablet (324 mg total) by mouth daily before breakfast 30 tablet 5    LORazepam (ATIVAN) 1 mg tablet Take 1 mg by mouth Takes 1 whole tab at bedtime and a 1/2 tab if needed   metoprolol succinate (TOPROL-XL) 25 mg 24 hr tablet Take 25 mg by mouth daily        mometasone (NASONEX) 50 mcg/act nasal spray 2 sprays into each nostril daily 1 Act 5    multivitamin (THERAGRAN) TABS Take 1 tablet by mouth daily   valsartan (DIOVAN) 320 MG tablet Take 320 mg by mouth daily  No current facility-administered medications for this visit  Allergies   Allergen Reactions    Ace Inhibitors      Annotation - [id number]: COUGH    Aspirin GI Intolerance     Category: Adverse Reaction; Annotation - [id number]: GI DISTRESS    Azithromycin Itching     Annotation - [id number]: PERIRECTAL IRRITATION    Chocolate     Eggs Or Egg-Derived Products      Annotation - [id number]: NO FLU SHOTS    Flu Virus Vaccine      Other reaction(s): ALLERGY TO EGGS     Immunization History   Administered Date(s) Administered    TD (adult) Preservative Free 01/01/2004       Patient Care Team:  Shwetha Sandoval MD as PCP - MD Kaitlin Cam MD Marvia Perch, MD      Medicare Screening Tests and Risk Assessments:  AWV Clinical     ISAR:   Previous hospitalizations?:  No       Once in a Lifetime Medicare Screening:   EKG performed?:  No    AAA screening performed? (if performed, please add date to Health Maintenance):  No       Medicare Screening Tests and Risk Assessment:   AAA Risk Assessment    None Indicated:  Yes    Osteoporosis Risk Assessment    None indicated:  Yes    HIV Risk Assessment    None indicated:  Yes        Drug and Alcohol Use:   Tobacco use    Cigarettes:  never smoker    Smokeless:  never used smokeless tobacco    Tobacco use duration    Tobacco Cessation Readiness    Alcohol use    Alcohol use:  rare use    Amount of alcohol consumed:  very rare use   Concern about alcohol use:   No Attempts to cut down:  No    Patient concern:  No    Morning drinking:  No Family concern:  No   Alcohol Treatment Readiness   Illicit Drug Use    Drug use:  never        Diet & Exercise:   Diet   What is your diet?:  Regular, Limited junk food   How many servings a day of the following:   Fruits and Vegetables:  1-2 Meat:  1-2   Whole Grains:  1 Simple Carbs:  0   Dairy:  1 Soda:  0   Coffee:  1 Tea:  0   Exercise    Do you currently exercise?:  currently not exercising       Cognitive Impairment Screening:   Depression screening preformed:  No    Cognitive Impairment Screening    Do you have difficulty learning or retaining new information?:  No Do you have difficulty handling new tasks?:  No   Do you have difficulty with reasoning?:  No    Do you have difficulty with language?:  No Do you have difficulty with behavior?:  No       Functional Ability/Level of Safety:   Hearing    Hearing difficulties:  No Bilateral:  normal   Left:  normal Right:  normal   Hearing aid:  No    Hearing Impairment Assessment    Hearing status:  No impairment   Current Activities    Status:  unlimited ADL's, unlimited driving, unlimited social activities   Help needed with the folllowing:    Using the phone:  No Transportation:  No   Shopping:  No Preparing Meals:  No   Doing Housework:  No Doing Laundry:  No   Managing Medications:  No Managing Money:  No   ADL    Feeding:  Independant   Oral hygiene and Facial grooming:  Independant   Bathing:  Independant   Upper Body Dressing:  Independant   Lower Body Dressing:  Independant   Toileting:  Independant   Bed Mobility:  Independant   Fall Risk   Have you fallen in the last 12 months?:  No Are you unsteady on your feet?:  No    Are you taking any medications that may cause fatigue or dizziness?:  No    Do you rush to the bathroom potentially risking a fall?:  No   Injury History       Home Safety:   Home Safety Risk Factors       Advanced Directives:   Advanced Directives    Living Will:  Yes Durable POA for healthcare: Yes   Advanced directive:  Yes    Patient's End of Life Decisions        Urinary Incontinence:   Do you have urinary incontinence?:  No Do you have incomplete emptying?:  No   Do you urinate frequently?:  No Do you have urinary urgency?:  No   Do you have urinary hesitancy?:  No Do you have dysuria (painful and/or difficult urination)?:  No   Do you have nocturia (waking up to urinate)?:  No Do you strain when urinating (have to push to urinate)?:  No   Do you have a weak stream when urinating?:  No Do you have intermittent streaming when urinating?:  No   Do you dribble urine after finishing?:  No    Do you have vaginal pressure?:  No Do you have vaginal dryness?:  No       Glaucoma:            Provider Screening     Preventative Screening/Counseling:   Cardiovascular Screening/Counseling:   (Labs Q5 years, EKG optional one-time)   General:  Risks and Benefits Discussed, Screening Current Counseling:  Healthy Diet          Diabetes Screening/Counseling:   (2 tests/year if Pre-Diabetes or 1 test/year if no Diabetes)   General:  Risks and Benefits Discussed, Screening Current Counseling:  Healthy Diet          Colorectal Cancer Screening/Counseling:   (FOBT Q1 yr; Flex Sig Q4 yrs or Q10 yrs after Screening Colonoscopy; Screening Colonoscpy Q2 yrs High Risk or Q10 yrs Low Risk; Barium Enema Q2 yrs High Risk or Q4 yrs Low Risk)   General:  Risks and Benefits Discussed, Screening Current           Prostate Cancer Screening/Counseling:   (Annual)          Breast Cancer Screening/Counseling:   (Baseline Age 28 - 43; Annual Age 36+)   General:  Risks and Benefits Discussed, Screening Not Indicated          Cervical Cancer Screening/Counseling:   (Annual for High Risk or Childbearing Age with Abnormal Pap in Last 3 yrs;  Every 2 all others)         Osteoporosis Screening/Counseling:   (Every 2 Yrs if at risk or more if medically necessary)   General:  Risks and Benefits Discussed, Screening Current           AAA Screening/Counseling:   (Once per Lifetime with risk factors)          Glaucoma Screening/Counseling:   (Annual)   General:  Risks and Benefits Discussed, Screening Current          HIV Screening/Counseling:   (Voluntary; Once annually for high risk OR 3 times for Pregnancy at diagnosis of IUP; 3rd trimester; and at Labor         Hepatitis C Screening:             Immunizations:   Pneumococcal (Once in a Lifetime):  Risks & Benefits Discussed   Zostavax (Medicare D Coverage, Pt >70 yo):  Risks & Benefits Discussed       Other Preventative Couseling (Non-Medicare Wellness Visit Required):       Referrals (Non-Medicare Wellness Visit Required):       Medical Equipment/Suppliers:           No exam data present  Reviewed Updated [facility] Prior Wellness Visits:   Last Medicare wellness visit information was reviewed, patient interviewed , no change since last AWVyes  Last Medicare wellness visit information was reviewed, patient interviewed and updates made to the record today yes    Assessment and Plan:  1   Medicare annual wellness visit, subsequent         Health Maintenance Due   Topic Date Due    PNEUMOCOCCAL POLYSACCHARIDE VACCINE AGE 65 AND OVER  07/31/2003    DTaP,Tdap,and Td Vaccines (1 - Tdap) 01/02/2004    GLAUCOMA SCREENING 67+ YR  07/31/2005    INFLUENZA VACCINE  09/01/2017

## 2018-04-23 ENCOUNTER — OFFICE VISIT (OUTPATIENT)
Dept: HEMATOLOGY ONCOLOGY | Facility: HOSPITAL | Age: 78
End: 2018-04-23
Payer: MEDICARE

## 2018-04-23 ENCOUNTER — LAB (OUTPATIENT)
Dept: LAB | Facility: HOSPITAL | Age: 78
End: 2018-04-23
Attending: INTERNAL MEDICINE
Payer: MEDICARE

## 2018-04-23 VITALS
RESPIRATION RATE: 16 BRPM | BODY MASS INDEX: 44.08 KG/M2 | HEIGHT: 58 IN | DIASTOLIC BLOOD PRESSURE: 70 MMHG | TEMPERATURE: 98 F | SYSTOLIC BLOOD PRESSURE: 110 MMHG | WEIGHT: 210 LBS | OXYGEN SATURATION: 97 % | HEART RATE: 75 BPM

## 2018-04-23 DIAGNOSIS — D50.9 IRON DEFICIENCY ANEMIA, UNSPECIFIED IRON DEFICIENCY ANEMIA TYPE: Primary | ICD-10-CM

## 2018-04-23 DIAGNOSIS — D50.8 OTHER IRON DEFICIENCY ANEMIA: ICD-10-CM

## 2018-04-23 DIAGNOSIS — I10 ESSENTIAL HYPERTENSION: ICD-10-CM

## 2018-04-23 LAB
ANION GAP SERPL CALCULATED.3IONS-SCNC: 7 MMOL/L (ref 4–13)
BUN SERPL-MCNC: 26 MG/DL (ref 5–25)
CALCIUM SERPL-MCNC: 8.9 MG/DL (ref 8.3–10.1)
CHLORIDE SERPL-SCNC: 105 MMOL/L (ref 100–108)
CO2 SERPL-SCNC: 31 MMOL/L (ref 21–32)
CREAT SERPL-MCNC: 0.84 MG/DL (ref 0.6–1.3)
ERYTHROCYTE [DISTWIDTH] IN BLOOD BY AUTOMATED COUNT: 22.4 % (ref 11.6–15.1)
FERRITIN SERPL-MCNC: 15 NG/ML (ref 8–388)
GFR SERPL CREATININE-BSD FRML MDRD: 66 ML/MIN/1.73SQ M
GLUCOSE P FAST SERPL-MCNC: 94 MG/DL (ref 65–99)
HCT VFR BLD AUTO: 36.9 % (ref 34.8–46.1)
HGB BLD-MCNC: 11 G/DL (ref 11.5–15.4)
IRON SATN MFR SERPL: 42 %
IRON SERPL-MCNC: 171 UG/DL (ref 50–170)
MCH RBC QN AUTO: 25.3 PG (ref 26.8–34.3)
MCHC RBC AUTO-ENTMCNC: 29.8 G/DL (ref 31.4–37.4)
MCV RBC AUTO: 85 FL (ref 82–98)
PLATELET # BLD AUTO: 207 THOUSANDS/UL (ref 149–390)
PMV BLD AUTO: 10.6 FL (ref 8.9–12.7)
POTASSIUM SERPL-SCNC: 4.2 MMOL/L (ref 3.5–5.3)
RBC # BLD AUTO: 4.35 MILLION/UL (ref 3.81–5.12)
SODIUM SERPL-SCNC: 143 MMOL/L (ref 136–145)
TIBC SERPL-MCNC: 411 UG/DL (ref 250–450)
WBC # BLD AUTO: 4.26 THOUSAND/UL (ref 4.31–10.16)

## 2018-04-23 PROCEDURE — 85027 COMPLETE CBC AUTOMATED: CPT

## 2018-04-23 PROCEDURE — 80048 BASIC METABOLIC PNL TOTAL CA: CPT

## 2018-04-23 PROCEDURE — 36415 COLL VENOUS BLD VENIPUNCTURE: CPT

## 2018-04-23 PROCEDURE — 83550 IRON BINDING TEST: CPT

## 2018-04-23 PROCEDURE — 99205 OFFICE O/P NEW HI 60 MIN: CPT | Performed by: INTERNAL MEDICINE

## 2018-04-23 PROCEDURE — 83540 ASSAY OF IRON: CPT

## 2018-04-23 PROCEDURE — 82728 ASSAY OF FERRITIN: CPT

## 2018-04-23 RX ORDER — FERROUS SULFATE 325(65) MG
TABLET ORAL
Refills: 5 | COMMUNITY
Start: 2018-04-16 | End: 2018-07-09 | Stop reason: CLARIF

## 2018-04-23 NOTE — LETTER
April 23, 2018     Jcarlos Huff, [address] [address]    Patient: Amie Corcoran   YOB: 1940   Date of Visit: 4/23/2018       Dear Dr Agnieszka Garcia: Thank you for referring Hanane Crook to me for evaluation  Below are my notes for this consultation  If you have questions, please do not hesitate to call me  I look forward to following your patient along with you  Sincerely,        Perla Nicole MD        CC: No Recipients  Perla Nicole MD  4/23/2018  4:19 PM  Incomplete     Oncology Outpatient Consult Note  Amie Corcoran 76 y o  female MRN: @ Encounter: [id number]        Date:  4/23/2018        CC:  Iron deficiency anemia      HPI:  Amie Corcoran is seen for initial consultation 4/23/2018 regarding Her iron deficiency anemia  She has a history of stage IB grade 1 endometrial carcinoma with atypical cytology for which she had a robotic assisted laparoscopic hysterectomy with bilateral salpingo-oophorectomy, bilateral pelvic lymph node dissection, excision of right pelvic peritoneum and repair of inherent vaginal laceration  The patient then got chemotherapy with Taxol and carboplatin  This all finished in April 2016  She was recently noticed to have hypochromic microcytic anemia  Iron studies showed iron deficiency  She was started on oral iron  Her numbers actually have improved significantly from a hemoglobin in the 8 range now in the 11 range  She is feeling much better  She is just taking 1 iron pill a day  She is already set up to have a GI workup done  She is getting a colonoscopy  Denies any nausea denies any vomiting denies any diarrhea  Denies any black stools or blood in her stool  Her 14 point review of systems today was negative          Cancer Staging:  Endometrial cancer [facility])    Staging form: Corpus Uteri - Carcinoma, AJCC 8th Edition    - Clinical: FIGO Stage IB - Signed by Iris Davila PA-C on 2/14/2018    Molecular Testing: Previous Hematologic/ Oncologic History:       Endometrial cancer ([facility] )    1/5/2016 Initial Diagnosis     Endometrial cancer ([facility] )         1/5/2016 Surgery     Robotic assisted total laparoscopic hysterectomy, bilateral salpingo-oophorectomy, bilateral pelvic lymph node dissection, excision of right pelvic peritoneum and repair of inherent vaginal laceration   A  19 of 23 mm depth of invasion, positive LVSI, 7 negative lymph nodes  B  Atypical peritoneal cytology with negative peritoneal biopsies            - 4/1/2016 Chemotherapy     Taxol 135 mg/m2 and carboplatin AUC 5 every 21 days  She completed 4 cycles  - 5/3/2016 Radiation     Vaginal brachytherapy                Test Results:    Imaging: No results found  Labs:   Lab Results   Component Value Date    WBC 4 26 (L) 04/23/2018    HGB 11 0 (L) 04/23/2018    HCT 36 9 04/23/2018    MCV 85 04/23/2018     04/23/2018     Lab Results   Component Value Date     04/23/2018    K 4 2 04/23/2018     04/23/2018    CO2 31 04/23/2018    ANIONGAP 7 04/23/2018    BUN 26 (H) 04/23/2018    CREATININE 0 84 04/23/2018    GLUCOSE 92 02/01/2017    GLUF 94 04/23/2018    CALCIUM 8 9 04/23/2018    AST 35 02/01/2018    ALT 25 02/01/2018    ALKPHOS 88 02/01/2018    PROT 7 0 02/01/2018    BILITOT 0 50 02/01/2018    EGFR 66 04/23/2018         Lab Results   Component Value Date    IRON 171 (H) 04/23/2018    TIBC 411 04/23/2018    FERRITIN 15 04/23/2018       No results found for: [id number]        ROS: As stated in history of present illness otherwise her 14 point review of systems today was negative          Active Problems:   Patient Active Problem List   Diagnosis    Endometrial cancer ([facility] )    Iron deficiency anemia    Essential hypertension    Gastroesophageal reflux disease without esophagitis    Other hyperlipidemia    Morbid obesity with BMI of 45 0-49 9, adult ([place] [id number] )    Allergic rhinitis    Anxiety    Ascending aortic aneurysm ([place] [address] ) Past Medical History:   Past Medical History:   Diagnosis Date    Anxiety     Cataract     [facility] )     Depression     GERD (gastroesophageal reflux disease)     Hearing loss on left     Hypertension     Morbid obesity ([facility] )     Osteoarthritis     Retinopathy     TIA (transient ischemic attack)     2001    Uterine cancer ([facility] )        Surgical History:   Past Surgical History:   Procedure Laterality Date    EYE SURGERY Left 10/2016    HYSTERECTOMY      LYMPH NODE BIOPSY      OOPHORECTOMY Bilateral     PELVIC LAPAROSCOPY         Family History:    Family History   Problem Relation Age of Onset    Breast cancer Sister     Prostate cancer Brother     Heart failure Mother     Anemia Mother     Alzheimer's disease Father     Substance Abuse Neg Hx      neg fam hx    Mental illness Neg Hx      neg fam hx       Cancer-related family history includes Breast cancer in her sister; Prostate cancer in her brother  Social History:   Social History     Social History    Marital status: Single     Spouse name: N/A    Number of children: N/A    Years of education: N/A     Occupational History    Not on file  Social History Main Topics    Smoking status: Never Smoker    Smokeless tobacco: Never Used    Alcohol use Yes      Comment: On occasion    Drug use: No    Sexual activity: Not on file     Other Topics Concern    Not on file     Social History Narrative    Uses seat belt    Caffeine use in the a m  Christian    Dental care, regularly     Exercise: yarkwork    Lives by [employer]  Feels safe at home  Current Medications:   Current Outpatient Prescriptions   Medication Sig Dispense Refill    acetaminophen (TYLENOL) 325 mg tablet Take 650 mg by mouth every 4 (four) hours as needed for mild pain   atorvastatin (LIPITOR) 10 mg tablet Take 10 mg by mouth daily        Calcium Carb-Cholecalciferol (CALCIUM 600 + D PO) Take 1 capsule by mouth daily       Cholecalciferol (VITAMIN D-3) 1000 UNITS CAPS Take 1 tablet by mouth daily   ferrous sulfate 324 (65 Fe) mg Take 1 tablet (324 mg total) by mouth daily before breakfast 30 tablet 5    ferrous sulfate 325 (65 Fe) mg tablet TAKE 1 TABLET (324 MG TOTAL) BY MOUTH DAILY BEFORE BREAKFAST  5    LORazepam (ATIVAN) 1 mg tablet Take 1 mg by mouth Takes 1 whole tab at bedtime and a 1/2 tab if needed   metoprolol succinate (TOPROL-XL) 25 mg 24 hr tablet Take 25 mg by mouth daily        mometasone (NASONEX) 50 mcg/act nasal spray 2 sprays into each nostril daily 1 Act 5    multivitamin (THERAGRAN) TABS Take 1 tablet by mouth daily   valsartan (DIOVAN) 320 MG tablet Take 320 mg by mouth daily  No current facility-administered medications for this visit  Allergies: Allergies   Allergen Reactions    Ace Inhibitors      Annotation - [id number]: COUGH    Aspirin GI Intolerance     Category: Adverse Reaction; Annotation - [id number]: GI DISTRESS    Azithromycin Itching     Annotation - [id number]: PERIRECTAL IRRITATION    Chocolate     Eggs Or Egg-Derived Products      Annotation - [id number]: NO FLU SHOTS    Flu Virus Vaccine      Other reaction(s): ALLERGY TO EGGS         Physical Exam:    Body surface area is 1 86 meters squared  Wt Readings from Last 3 Encounters:   04/23/18 95 3 kg (210 lb)   04/09/18 96 2 kg (212 lb)   02/22/18 94 3 kg (208 lb)        Temp Readings from Last 3 Encounters:   04/23/18 98 °F (36 7 °C) (Tympanic)   02/21/18 98 3 °F (36 8 °C) (Oral)   11/15/17 97 7 °F (36 5 °C) (Oral)        BP Readings from Last 3 Encounters:   04/23/18 110/70   04/09/18 122/78   02/22/18 120/68         Pulse Readings from Last 3 Encounters:   04/23/18 75   04/09/18 66   02/22/18 78     @LASTSAO2(3)@    Physical Exam     Constitutional   General appearance: No acute distress, well appearing and well nourished  Eyes   Conjunctiva and lids: No swelling, erythema or discharge  Pupils and irises: Equal, round and reactive to light  Ears, Nose, Mouth, and Throat   External inspection of ears and nose: Normal     Nasal mucosa, septum, and turbinates: Normal without edema or erythema  Oropharynx: Normal with no erythema, edema, exudate or lesions  Pulmonary   Respiratory effort: No increased work of breathing or signs of respiratory distress  Auscultation of lungs: Clear to auscultation  Cardiovascular   Palpation of heart: Normal PMI, no thrills  Auscultation of heart: Normal rate and rhythm, normal S1 and S2, without murmurs  Examination of extremities for edema and/or varicosities: Normal     Carotid pulses: Normal     Abdomen   Abdomen: Non-tender, no masses  Liver and spleen: No hepatomegaly or splenomegaly  Lymphatic   Palpation of lymph nodes in neck: No lymphadenopathy  Musculoskeletal   Gait and station: Normal     Digits and nails: Normal without clubbing or cyanosis  Inspection/palpation of joints, bones, and muscles: Normal     Skin   Skin and subcutaneous tissue: Normal without rashes or lesions  Neurologic   Cranial nerves: Cranial nerves 2-12 intact  Sensation: No sensory loss  Psychiatric   Orientation to person, place, and time: Normal     Mood and affect: Normal           Assessment/ Plan: This is a pleasant 68-year-old female with a past medical history of endometrial cancer status post surgery followed by chemotherapy and radiation in 2016 who was found to be iron deficient  She is getting a colonoscopy in the next few weeks  She has been on oral iron and her numbers have started correcting very nicely  I advise you to continue taking her oral iron  I'll see her back in 4 months with repeat blood work  Until then if she has any questions she will call our office  The patient is agreeable to this  At this point since she is having a nice response to oral iron there is no need for IV  She states she has no side effects on this  I'll see her back in 4 months with repeat blood work  Goals and Barriers:  Current Goal:  Monitoring and treatment of iron deficiency anemia  Barriers: None  Patient's Capacity to Self Care:  Patient  able to self care  Portions of the record may have been created with voice recognition software   Occasional wrong word or "sound a like" substitutions may have occurred due to the inherent limitations of voice recognition software   Read the chart carefully and recognize, using context, where substitutions have occurred

## 2018-04-23 NOTE — PROGRESS NOTES
Oncology Outpatient Consult Note  Hazeline Najjar 76 y o  female MRN: @ Encounter: [id number]        Date:  4/23/2018    CC:  Iron deficiency anemia      HPI:  Hazeline Najjar is seen for initial consultation 4/23/2018 regarding Her iron deficiency anemia  She has a history of stage IB grade 1 endometrial carcinoma with atypical cytology for which she had a robotic assisted laparoscopic hysterectomy with bilateral salpingo-oophorectomy, bilateral pelvic lymph node dissection, excision of right pelvic peritoneum and repair of inherent vaginal laceration  The patient then got chemotherapy with Taxol and carboplatin  This all finished in April 2016  She was recently noticed to have hypochromic microcytic anemia  Iron studies showed iron deficiency  She was started on oral iron  Her numbers actually have improved significantly from a hemoglobin in the 8 range now in the 11 range  She is feeling much better  She is just taking 1 iron pill a day  She is already set up to have a GI workup done  She is getting a colonoscopy  Denies any nausea denies any vomiting denies any diarrhea  Denies any black stools or blood in her stool  Her 14 point review of systems today was negative  Cancer Staging:  Endometrial cancer [facility])    Staging form: Corpus Uteri - Carcinoma, AJCC 8th Edition    - Clinical: FIGO Stage IB - Signed by Mohan Mane PA-C on 2/14/2018    Previous Hematologic/ Oncologic History:       Endometrial cancer ([facility] [address] )    1/5/2016 Initial Diagnosis     Endometrial cancer ([facility] [address] )         1/5/2016 Surgery     Robotic assisted total laparoscopic hysterectomy, bilateral salpingo-oophorectomy, bilateral pelvic lymph node dissection, excision of right pelvic peritoneum and repair of inherent vaginal laceration   A  19 of 23 mm depth of invasion, positive LVSI, 7 negative lymph nodes     B  Atypical peritoneal cytology with negative peritoneal biopsies            - 4/1/2016 Chemotherapy     Taxol 135 mg/m2 and carboplatin AUC 5 every 21 days  She completed 4 cycles  - 5/3/2016 Radiation     Vaginal brachytherapy            Test Results:    Imaging: No results found  Labs:   Lab Results   Component Value Date    WBC 4 26 (L) 04/23/2018    HGB 11 0 (L) 04/23/2018    HCT 36 9 04/23/2018    MCV 85 04/23/2018     04/23/2018     Lab Results   Component Value Date     04/23/2018    K 4 2 04/23/2018     04/23/2018    CO2 31 04/23/2018    ANIONGAP 7 04/23/2018    BUN 26 (H) 04/23/2018    CREATININE 0 84 04/23/2018    GLUCOSE 92 02/01/2017    GLUF 94 04/23/2018    CALCIUM 8 9 04/23/2018    AST 35 02/01/2018    ALT 25 02/01/2018    ALKPHOS 88 02/01/2018    PROT 7 0 02/01/2018    BILITOT 0 50 02/01/2018    EGFR 66 04/23/2018     Lab Results   Component Value Date    IRON 171 (H) 04/23/2018    TIBC 411 04/23/2018    FERRITIN 15 04/23/2018       No results found for: [id number]    ROS: As stated in history of present illness otherwise her 14 point review of systems today was negative      Active Problems:   Patient Active Problem List   Diagnosis    Endometrial cancer ([facility] [address] )    Iron deficiency anemia    Essential hypertension    Gastroesophageal reflux disease without esophagitis    Other hyperlipidemia    Morbid obesity with BMI of 45 0-49 9, adult (HCC)    Allergic rhinitis    Anxiety    Ascending aortic aneurysm (HCC)       Past Medical History:   Past Medical History:   Diagnosis Date    Anxiety     Cataract     CVA (cerebrovascular accident) ([facility] [address] )     Depression     GERD (gastroesophageal reflux disease)     Hearing loss on left     Hypertension     Morbid obesity ([facility] [address] )     Osteoarthritis     Retinopathy     TIA (transient ischemic attack)     2001    Uterine cancer [facility])        Surgical History:   Past Surgical History:   Procedure Laterality Date    EYE SURGERY Left 10/2016    HYSTERECTOMY      LYMPH NODE BIOPSY      OOPHORECTOMY Bilateral     PELVIC LAPAROSCOPY Family History:    Family History   Problem Relation Age of Onset    Breast cancer Sister     Prostate cancer Brother     Heart failure Mother     Anemia Mother     Alzheimer's disease Father     Substance Abuse Neg Hx      neg fam hx    Mental illness Neg Hx      neg fam hx       Cancer-related family history includes Breast cancer in her sister; Prostate cancer in her brother  Social History:   Social History     Social History    Marital status: Single     Spouse name: N/A    Number of children: N/A    Years of education: N/A     Occupational History    Not on file  Social History Main Topics    Smoking status: Never Smoker    Smokeless tobacco: Never Used    Alcohol use Yes      Comment: On occasion    Drug use: No    Sexual activity: Not on file     Other Topics Concern    Not on file     Social History Narrative    Uses seat belt    Caffeine use in the a m  Orthodox    Dental care, regularly     Exercise: yarkwork    Lives by [employer]  Feels safe at home  Current Medications: Allergies: Allergies   Allergen Reactions    Ace Inhibitors      Annotation - [id number]: COUGH    Aspirin GI Intolerance     Category: Adverse Reaction; Annotation - [id number]: GI DISTRESS    Azithromycin Itching     Annotation - [id number]: PERIRECTAL IRRITATION    Chocolate     Eggs Or Egg-Derived Products      Annotation - [id number]: NO FLU SHOTS    Flu Virus Vaccine      Other reaction(s): ALLERGY TO EGGS         Physical Exam:    Body surface area is 1 86 meters squared      Wt Readings from Last 3 Encounters:   04/23/18 95 3 kg (210 lb)   04/09/18 96 2 kg (212 lb)   02/22/18 94 3 kg (208 lb)        Temp Readings from Last 3 Encounters:   04/23/18 98 °F (36 7 °C) (Tympanic)   02/21/18 98 3 °F (36 8 °C) (Oral)   11/15/17 97 7 °F (36 5 °C) (Oral)        BP Readings from Last 3 Encounters:   04/23/18 110/70   04/09/18 122/78   02/22/18 120/68         Pulse Readings from Last 3 Encounters:   04/23/18 75   04/09/18 66   02/22/18 78       Physical Exam     Constitutional   General appearance: No acute distress, well appearing and well nourished  Eyes   Conjunctiva and lids: No swelling, erythema or discharge  Pupils and irises: Equal, round and reactive to light  Ears, Nose, Mouth, and Throat   External inspection of ears and nose: Normal     Nasal mucosa, septum, and turbinates: Normal without edema or erythema  Oropharynx: Normal with no erythema, edema, exudate or lesions  Pulmonary   Respiratory effort: No increased work of breathing or signs of respiratory distress  Auscultation of lungs: Clear to auscultation  Cardiovascular   Palpation of heart: Normal PMI, no thrills  Auscultation of heart: Normal rate and rhythm, normal S1 and S2, without murmurs  Examination of extremities for edema and/or varicosities: Normal     Carotid pulses: Normal     Abdomen   Abdomen: Non-tender, no masses  Liver and spleen: No hepatomegaly or splenomegaly  Lymphatic   Palpation of lymph nodes in neck: No lymphadenopathy  Musculoskeletal   Gait and station: Normal     Digits and nails: Normal without clubbing or cyanosis  Inspection/palpation of joints, bones, and muscles: Normal     Skin   Skin and subcutaneous tissue: Normal without rashes or lesions  Neurologic   Cranial nerves: Cranial nerves 2-12 intact  Sensation: No sensory loss  Psychiatric   Orientation to person, place, and time: Normal     Mood and affect: Normal           Assessment/ Plan: This is a pleasant 49-year-old female with a past medical history of endometrial cancer status post surgery followed by chemotherapy and radiation in 2016 who was found to be iron deficient  She is getting a colonoscopy in the next few weeks  She has been on oral iron and her numbers have started correcting very nicely  I advise you to continue taking her oral iron   I'll see her back in 4 months with repeat blood work  Until then if she has any questions she will call our office  The patient is agreeable to this  At this point since she is having a nice response to oral iron there is no need for IV  She states she has no side effects on this  I'll see her back in 4 months with repeat blood work  Goals and Barriers:  Current Goal:  Monitoring and treatment of iron deficiency anemia  Barriers: None  Patient's Capacity to Self Care:  Patient  able to self care  Portions of the record may have been created with voice recognition software   Occasional wrong word or "sound a like" substitutions may have occurred due to the inherent limitations of voice recognition software   Read the chart carefully and recognize, using context, where substitutions have occurred

## 2018-07-09 ENCOUNTER — OFFICE VISIT (OUTPATIENT)
Dept: FAMILY MEDICINE CLINIC | Facility: HOSPITAL | Age: 78
End: 2018-07-09
Payer: MEDICARE

## 2018-07-09 VITALS
WEIGHT: 213 LBS | DIASTOLIC BLOOD PRESSURE: 76 MMHG | BODY MASS INDEX: 44.71 KG/M2 | SYSTOLIC BLOOD PRESSURE: 124 MMHG | HEIGHT: 58 IN | HEART RATE: 72 BPM | RESPIRATION RATE: 14 BRPM

## 2018-07-09 DIAGNOSIS — D50.8 OTHER IRON DEFICIENCY ANEMIA: ICD-10-CM

## 2018-07-09 DIAGNOSIS — K44.9 HIATAL HERNIA: ICD-10-CM

## 2018-07-09 DIAGNOSIS — I71.2 ASCENDING AORTIC ANEURYSM (HCC): Primary | ICD-10-CM

## 2018-07-09 DIAGNOSIS — I10 ESSENTIAL HYPERTENSION: ICD-10-CM

## 2018-07-09 PROCEDURE — 99214 OFFICE O/P EST MOD 30 MIN: CPT | Performed by: INTERNAL MEDICINE

## 2018-07-09 RX ORDER — FERROUS SULFATE TAB EC 324 MG (65 MG FE EQUIVALENT) 324 (65 FE) MG
324 TABLET DELAYED RESPONSE ORAL
Qty: 30 TABLET | Refills: 3 | Status: SHIPPED | OUTPATIENT
Start: 2018-07-09 | End: 2018-08-27 | Stop reason: SDUPTHER

## 2018-07-09 NOTE — PROGRESS NOTES
Assessment/Plan:    Hiatal hernia  Denies dysphagia, odynophagia  Has gerd with only certain foods      Ascending aortic aneurysm ([address] )  Denies chest pain, will repeat ct chest in 2/2019    Essential hypertension  Well controlled on diovan    Iron deficiency anemia  Will get repeat blood tests done before appointment with Dr Bernabe Jaramillo       Diagnoses and all orders for this visit:    Ascending aortic aneurysm ([address] )    Hiatal hernia    Essential hypertension    Other iron deficiency anemia  -     ferrous sulfate 324 (65 Fe) mg; Take 1 tablet (324 mg total) by mouth daily before breakfast          Subjective:      Patient ID: Eufemia Ortiz is a 76 y o  female  Pt's also here to discuss findings of aortic aneurysm, hiatal hernia and renal cysts on last ct scan      Hypertension   This is a chronic problem  The current episode started more than 1 year ago  The problem is unchanged  The problem is controlled  Pertinent negatives include no chest pain, headaches, palpitations or shortness of breath  The following portions of the patient's history were reviewed and updated as appropriate: current medications, past family history, past medical history, past social history, past surgical history and problem list     Review of Systems   Constitutional: Negative for fever  HENT: Negative for congestion  Eyes: Negative for visual disturbance  Respiratory: Negative for cough and shortness of breath  Cardiovascular: Negative for chest pain, palpitations and leg swelling  Gastrointestinal: Negative for abdominal pain, blood in stool and diarrhea  Endocrine: Negative for polydipsia and polyphagia  Genitourinary: Negative for difficulty urinating and dysuria  Musculoskeletal: Negative for joint swelling, myalgias and neck stiffness  Skin: Negative for rash  Neurological: Negative for weakness, numbness and headaches  Hematological: Negative for adenopathy     Psychiatric/Behavioral: Negative for dysphoric mood  All other systems reviewed and are negative  Objective:    /76   Pulse 72   Resp 14   Ht 4' 9 87" (1 47 m)   Wt 96 6 kg (213 lb)   BMI 44 72 kg/m²      Physical Exam   Constitutional: She is oriented to person, place, and time  She appears well-developed  No distress  HENT:   Head: Normocephalic  Mouth/Throat: Oropharynx is clear and moist    Eyes: Conjunctivae are normal    Neck: Neck supple  Cardiovascular: Normal rate and regular rhythm  Pulmonary/Chest: Effort normal  No respiratory distress  She has no wheezes  She has no rales  Abdominal: Soft  Bowel sounds are normal  She exhibits no distension  There is no tenderness  Neurological: She is alert and oriented to person, place, and time  No cranial nerve deficit  Skin: Skin is warm and dry  No rash noted  Psychiatric: She has a normal mood and affect  Vitals reviewed            Yani Oneal MD

## 2018-08-15 ENCOUNTER — OFFICE VISIT (OUTPATIENT)
Dept: GYNECOLOGIC ONCOLOGY | Facility: HOSPITAL | Age: 78
End: 2018-08-15
Payer: MEDICARE

## 2018-08-15 VITALS
SYSTOLIC BLOOD PRESSURE: 140 MMHG | BODY MASS INDEX: 44.33 KG/M2 | DIASTOLIC BLOOD PRESSURE: 80 MMHG | HEART RATE: 72 BPM | HEIGHT: 58 IN | WEIGHT: 211.2 LBS | RESPIRATION RATE: 16 BRPM | TEMPERATURE: 98.1 F

## 2018-08-15 DIAGNOSIS — C54.1 ENDOMETRIAL CANCER (HCC): Primary | ICD-10-CM

## 2018-08-15 PROCEDURE — 99213 OFFICE O/P EST LOW 20 MIN: CPT | Performed by: OBSTETRICS & GYNECOLOGY

## 2018-08-15 NOTE — ASSESSMENT & PLAN NOTE
Clinically without evidence of disease recurrence  Her performance status is 0   1  Return in 6 months for endometrial cancer surveillance

## 2018-08-15 NOTE — PROGRESS NOTES
Assessment/Plan:    Problem List Items Addressed This Visit        Genitourinary    Endometrial cancer ([facility] ) - Primary       Clinically without evidence of disease recurrence  Her performance status is 0   1  Return in 6 months for endometrial cancer surveillance  CHIEF COMPLAINT:   Endometrial cancer surveillance      Problem:  Cancer Staging  Endometrial cancer ([facility] )  Staging form: Corpus Uteri - Carcinoma, AJCC 8th Edition  - Clinical: FIGO Stage IB - Signed by Brittney Vidales PA-C on 2/14/2018        Previous therapy:     Endometrial cancer ([facility] )    1/5/2016 Initial Diagnosis     Endometrial cancer ([facility] )         1/5/2016 Surgery     Robotic assisted total laparoscopic hysterectomy, bilateral salpingo-oophorectomy, bilateral pelvic lymph node dissection, excision of right pelvic peritoneum and repair of inherent vaginal laceration   A  19 of 23 mm depth of invasion, positive LVSI, 7 negative lymph nodes  B  Atypical peritoneal cytology with negative peritoneal biopsies            - 4/1/2016 Chemotherapy     Taxol 135 mg/m2 and carboplatin AUC 5 every 21 days  She completed 4 cycles  - 5/3/2016 Radiation     Vaginal brachytherapy              Patient ID: Jamey Samano is a [de-identified] y o  female   47-year-old returns for endometrial cancer surveillance  She has had a good response to iron therapy for her iron deficiency anemia  She had upper endoscopy and colonoscopy  There was no evidence of malignancy  Her last CT scan was in February of 2018 which did not reveal any evidence of recurrent disease  She does not have any abdominal pain, pelvic pain, or vaginal bleeding  Overall, her quality of life is good          The following portions of the patient's history were reviewed and updated as appropriate: allergies, current medications, past family history, past medical history, past social history, past surgical history and problem list     Review of Systems   Constitutional: Negative for activity change and unexpected weight change  Gastrointestinal: Negative for abdominal distention, abdominal pain and blood in stool  Genitourinary: Negative for pelvic pain and vaginal bleeding  All other systems reviewed and are negative  Current Outpatient Prescriptions   Medication Sig Dispense Refill    acetaminophen (TYLENOL) 325 mg tablet Take 650 mg by mouth every 4 (four) hours as needed for mild pain   atorvastatin (LIPITOR) 10 mg tablet Take 10 mg by mouth daily   Calcium Carb-Cholecalciferol (CALCIUM 600 + D PO) Take 1 capsule by mouth daily   Cholecalciferol (VITAMIN D-3) 1000 UNITS CAPS Take 1 tablet by mouth daily   ferrous sulfate 324 (65 Fe) mg Take 1 tablet (324 mg total) by mouth daily before breakfast 30 tablet 3    LORazepam (ATIVAN) 1 mg tablet Take 1 mg by mouth Takes 1 whole tab at bedtime and a 1/2 tab if needed   metoprolol succinate (TOPROL-XL) 25 mg 24 hr tablet Take 25 mg by mouth daily        mometasone (NASONEX) 50 mcg/act nasal spray 2 sprays into each nostril daily 1 Act 5    multivitamin (THERAGRAN) TABS Take 1 tablet by mouth daily   valsartan (DIOVAN) 320 MG tablet Take 320 mg by mouth daily  No current facility-administered medications for this visit  Objective:    Blood pressure 140/80, pulse 72, temperature 98 1 °F (36 7 °C), temperature source Oral, resp  rate 16, height 4' 9 87" (1 47 m), weight 95 8 kg (211 lb 3 2 oz)  Body mass index is 44 34 kg/m²  Body surface area is 1 86 meters squared  Physical Exam   Constitutional: She is oriented to person, place, and time  She appears well-developed and well-nourished  No distress  HENT:   Head: Normocephalic and atraumatic  Neck: Normal range of motion  Neck supple  No thyromegaly present  Pulmonary/Chest: Effort normal    Abdominal: Soft  She exhibits no distension and no mass  There is no tenderness  There is no rebound and no guarding  Genitourinary:   Genitourinary Comments: The uterus, cervix, and adnexa are surgically absent  Vagina without masses, lesions, bleeding, apex is mobile   Musculoskeletal: She exhibits no edema  Lymphadenopathy:     She has no cervical adenopathy  Neurological: She is alert and oriented to person, place, and time  Skin: Skin is warm and dry  She is not diaphoretic  Psychiatric: She has a normal mood and affect   Her behavior is normal  Judgment and thought content normal

## 2018-08-20 ENCOUNTER — APPOINTMENT (OUTPATIENT)
Dept: LAB | Facility: HOSPITAL | Age: 78
End: 2018-08-20
Attending: INTERNAL MEDICINE
Payer: MEDICARE

## 2018-08-20 DIAGNOSIS — D50.9 IRON DEFICIENCY ANEMIA, UNSPECIFIED IRON DEFICIENCY ANEMIA TYPE: ICD-10-CM

## 2018-08-20 LAB
ALBUMIN SERPL BCP-MCNC: 3.1 G/DL (ref 3.5–5)
ALP SERPL-CCNC: 76 U/L (ref 46–116)
ALT SERPL W P-5'-P-CCNC: 29 U/L (ref 12–78)
ANION GAP SERPL CALCULATED.3IONS-SCNC: 7 MMOL/L (ref 4–13)
AST SERPL W P-5'-P-CCNC: 24 U/L (ref 5–45)
BASOPHILS # BLD AUTO: 0.03 THOUSANDS/ΜL (ref 0–0.1)
BASOPHILS NFR BLD AUTO: 1 % (ref 0–1)
BILIRUB SERPL-MCNC: 0.4 MG/DL (ref 0.2–1)
BUN SERPL-MCNC: 26 MG/DL (ref 5–25)
CALCIUM SERPL-MCNC: 8.9 MG/DL (ref 8.3–10.1)
CHLORIDE SERPL-SCNC: 107 MMOL/L (ref 100–108)
CO2 SERPL-SCNC: 27 MMOL/L (ref 21–32)
CREAT SERPL-MCNC: 0.76 MG/DL (ref 0.6–1.3)
EOSINOPHIL # BLD AUTO: 0.11 THOUSAND/ΜL (ref 0–0.61)
EOSINOPHIL NFR BLD AUTO: 3 % (ref 0–6)
ERYTHROCYTE [DISTWIDTH] IN BLOOD BY AUTOMATED COUNT: 13.7 % (ref 11.6–15.1)
FERRITIN SERPL-MCNC: 40 NG/ML (ref 8–388)
GFR SERPL CREATININE-BSD FRML MDRD: 74 ML/MIN/1.73SQ M
GLUCOSE P FAST SERPL-MCNC: 92 MG/DL (ref 65–99)
HCT VFR BLD AUTO: 36.3 % (ref 34.8–46.1)
HGB BLD-MCNC: 11.7 G/DL (ref 11.5–15.4)
IMM GRANULOCYTES # BLD AUTO: 0.01 THOUSAND/UL (ref 0–0.2)
IMM GRANULOCYTES NFR BLD AUTO: 0 % (ref 0–2)
IRON SATN MFR SERPL: 30 %
IRON SERPL-MCNC: 95 UG/DL (ref 50–170)
LYMPHOCYTES # BLD AUTO: 1.27 THOUSANDS/ΜL (ref 0.6–4.47)
LYMPHOCYTES NFR BLD AUTO: 31 % (ref 14–44)
MCH RBC QN AUTO: 29.9 PG (ref 26.8–34.3)
MCHC RBC AUTO-ENTMCNC: 32.2 G/DL (ref 31.4–37.4)
MCV RBC AUTO: 93 FL (ref 82–98)
MONOCYTES # BLD AUTO: 0.38 THOUSAND/ΜL (ref 0.17–1.22)
MONOCYTES NFR BLD AUTO: 9 % (ref 4–12)
NEUTROPHILS # BLD AUTO: 2.37 THOUSANDS/ΜL (ref 1.85–7.62)
NEUTS SEG NFR BLD AUTO: 56 % (ref 43–75)
PLATELET # BLD AUTO: 196 THOUSANDS/UL (ref 149–390)
PMV BLD AUTO: 10.9 FL (ref 8.9–12.7)
POTASSIUM SERPL-SCNC: 3.9 MMOL/L (ref 3.5–5.3)
PROT SERPL-MCNC: 6.3 G/DL (ref 6.4–8.2)
RBC # BLD AUTO: 3.91 MILLION/UL (ref 3.81–5.12)
SODIUM SERPL-SCNC: 141 MMOL/L (ref 136–145)
TIBC SERPL-MCNC: 320 UG/DL (ref 250–450)
WBC # BLD AUTO: 4.17 THOUSAND/UL (ref 4.31–10.16)

## 2018-08-20 PROCEDURE — 36415 COLL VENOUS BLD VENIPUNCTURE: CPT

## 2018-08-20 PROCEDURE — 83550 IRON BINDING TEST: CPT

## 2018-08-20 PROCEDURE — 80053 COMPREHEN METABOLIC PANEL: CPT

## 2018-08-20 PROCEDURE — 83918 ORGANIC ACIDS TOTAL QUANT: CPT

## 2018-08-20 PROCEDURE — 82728 ASSAY OF FERRITIN: CPT

## 2018-08-20 PROCEDURE — 83540 ASSAY OF IRON: CPT

## 2018-08-20 PROCEDURE — 85025 COMPLETE CBC W/AUTO DIFF WBC: CPT

## 2018-08-23 LAB
METHYLMALONATE SERPL-SCNC: 232 NMOL/L (ref 0–378)
SL AMB DISCLAIMER: NORMAL

## 2018-08-27 ENCOUNTER — OFFICE VISIT (OUTPATIENT)
Dept: HEMATOLOGY ONCOLOGY | Facility: HOSPITAL | Age: 78
End: 2018-08-27
Payer: MEDICARE

## 2018-08-27 VITALS
OXYGEN SATURATION: 98 % | WEIGHT: 214 LBS | HEIGHT: 58 IN | SYSTOLIC BLOOD PRESSURE: 112 MMHG | DIASTOLIC BLOOD PRESSURE: 78 MMHG | TEMPERATURE: 97.7 F | BODY MASS INDEX: 44.92 KG/M2 | HEART RATE: 63 BPM | RESPIRATION RATE: 16 BRPM

## 2018-08-27 DIAGNOSIS — K21.9 GASTROESOPHAGEAL REFLUX DISEASE, ESOPHAGITIS PRESENCE NOT SPECIFIED: Primary | ICD-10-CM

## 2018-08-27 DIAGNOSIS — D50.8 OTHER IRON DEFICIENCY ANEMIA: ICD-10-CM

## 2018-08-27 DIAGNOSIS — C54.1 ENDOMETRIAL CANCER (HCC): Primary | ICD-10-CM

## 2018-08-27 PROCEDURE — 99214 OFFICE O/P EST MOD 30 MIN: CPT | Performed by: INTERNAL MEDICINE

## 2018-08-27 RX ORDER — OMEPRAZOLE 20 MG/1
20 CAPSULE, DELAYED RELEASE ORAL DAILY
COMMUNITY
End: 2018-08-27 | Stop reason: SDUPTHER

## 2018-08-27 RX ORDER — OMEPRAZOLE 20 MG/1
20 CAPSULE, DELAYED RELEASE ORAL DAILY
Qty: 90 CAPSULE | Refills: 1 | Status: SHIPPED | OUTPATIENT
Start: 2018-08-27 | End: 2019-05-14 | Stop reason: ALTCHOICE

## 2018-08-27 RX ORDER — FERROUS SULFATE TAB EC 324 MG (65 MG FE EQUIVALENT) 324 (65 FE) MG
324 TABLET DELAYED RESPONSE ORAL
Qty: 90 TABLET | Refills: 1 | Status: SHIPPED | OUTPATIENT
Start: 2018-08-27 | End: 2018-11-14 | Stop reason: SDUPTHER

## 2018-11-14 ENCOUNTER — OFFICE VISIT (OUTPATIENT)
Dept: FAMILY MEDICINE CLINIC | Facility: HOSPITAL | Age: 78
End: 2018-11-14
Payer: MEDICARE

## 2018-11-14 VITALS
HEIGHT: 58 IN | DIASTOLIC BLOOD PRESSURE: 78 MMHG | SYSTOLIC BLOOD PRESSURE: 124 MMHG | HEART RATE: 59 BPM | BODY MASS INDEX: 45.66 KG/M2 | WEIGHT: 217.5 LBS | OXYGEN SATURATION: 97 %

## 2018-11-14 DIAGNOSIS — H61.23 BILATERAL IMPACTED CERUMEN: ICD-10-CM

## 2018-11-14 DIAGNOSIS — E66.01 MORBID OBESITY WITH BMI OF 45.0-49.9, ADULT (HCC): ICD-10-CM

## 2018-11-14 DIAGNOSIS — F41.9 ANXIETY: ICD-10-CM

## 2018-11-14 DIAGNOSIS — K21.9 GASTROESOPHAGEAL REFLUX DISEASE WITHOUT ESOPHAGITIS: ICD-10-CM

## 2018-11-14 DIAGNOSIS — I10 ESSENTIAL HYPERTENSION: ICD-10-CM

## 2018-11-14 DIAGNOSIS — E78.49 OTHER HYPERLIPIDEMIA: ICD-10-CM

## 2018-11-14 DIAGNOSIS — D50.8 OTHER IRON DEFICIENCY ANEMIA: Primary | ICD-10-CM

## 2018-11-14 PROCEDURE — 99214 OFFICE O/P EST MOD 30 MIN: CPT | Performed by: INTERNAL MEDICINE

## 2018-11-14 RX ORDER — ATORVASTATIN CALCIUM 10 MG/1
10 TABLET, FILM COATED ORAL DAILY
Qty: 90 TABLET | Refills: 2 | Status: SHIPPED | OUTPATIENT
Start: 2018-11-14 | End: 2019-06-06 | Stop reason: SDUPTHER

## 2018-11-14 RX ORDER — VALSARTAN 320 MG/1
320 TABLET ORAL DAILY
Qty: 90 TABLET | Refills: 2 | Status: SHIPPED | OUTPATIENT
Start: 2018-11-14 | End: 2019-09-17 | Stop reason: SDUPTHER

## 2018-11-14 RX ORDER — LORAZEPAM 1 MG/1
TABLET ORAL
Qty: 135 TABLET | Refills: 0 | Status: SHIPPED | OUTPATIENT
Start: 2018-11-14 | End: 2019-02-14

## 2018-11-14 RX ORDER — METOPROLOL SUCCINATE 25 MG/1
25 TABLET, EXTENDED RELEASE ORAL DAILY
Qty: 90 TABLET | Refills: 2 | Status: SHIPPED | OUTPATIENT
Start: 2018-11-14 | End: 2019-09-17 | Stop reason: SDUPTHER

## 2018-11-14 RX ORDER — FERROUS SULFATE TAB EC 324 MG (65 MG FE EQUIVALENT) 324 (65 FE) MG
324 TABLET DELAYED RESPONSE ORAL
Qty: 90 TABLET | Refills: 2 | Status: SHIPPED | OUTPATIENT
Start: 2018-11-14 | End: 2019-09-17 | Stop reason: SDUPTHER

## 2018-11-14 RX ORDER — FERROUS SULFATE 325(65) MG
1 TABLET ORAL
Refills: 1 | COMMUNITY
Start: 2018-08-28 | End: 2018-11-14 | Stop reason: SDUPTHER

## 2018-11-14 NOTE — PROGRESS NOTES
Assessment/Plan:    Gastroesophageal reflux disease without esophagitis  Stable on omeprazole, denies any gerd symptoms or dysphagia    Iron deficiency anemia  Iron studies and hgb are showing no anemia or iron deficiency on 8/2018    Anxiety  Pt's anxiety is controlled with current dose of lorazepam, doesn't feel sleepy, no falls    Essential hypertension  BP is well controlled on valsartan which is not on the recall list and toprol    Other hyperlipidemia  No myalgias on lipitor, cholesterol was normal in 2/2018    Morbid obesity with BMI of 45 0-49 9, adult ([facility] )  Pt watches diet closely       Diagnoses and all orders for this visit:    Other iron deficiency anemia  -     ferrous sulfate 324 (65 Fe) mg; Take 1 tablet (324 mg total) by mouth daily before breakfast  -     Iron Saturation %; Future  -     Ferritin; Future    Gastroesophageal reflux disease without esophagitis    Anxiety  -     LORazepam (ATIVAN) 1 mg tablet; Takes 1 whole tab at bedtime and a 1/2 tab if needed  Essential hypertension  -     metoprolol succinate (TOPROL-XL) 25 mg 24 hr tablet; Take 1 tablet (25 mg total) by mouth daily for 90 days  -     valsartan (DIOVAN) 320 MG tablet; Take 1 tablet (320 mg total) by mouth daily for 90 days  -     CBC; Future  -     Comprehensive metabolic panel; Future  -     TSH, 3rd generation with Free T4 reflex; Future  -     Lipid panel; Future    Other hyperlipidemia  -     atorvastatin (LIPITOR) 10 mg tablet; Take 1 tablet (10 mg total) by mouth daily for 90 days    Morbid obesity with BMI of 45 0-49 9, adult ([facility])    Other orders  -     Discontinue: ferrous sulfate 325 (65 Fe) mg tablet; Take 1 tablet by mouth daily before breakfast          Subjective:      Patient ID: Isai López is a [de-identified] y o  female  Hypertension   This is a chronic problem  The current episode started more than 1 year ago  The problem is unchanged  The problem is controlled   Pertinent negatives include no chest pain, headaches, palpitations or shortness of breath  The following portions of the patient's history were reviewed and updated as appropriate: current medications, past family history, past medical history, past social history, past surgical history and problem list     Review of Systems   Constitutional: Negative for fever  HENT: Positive for hearing loss  Negative for congestion  Eyes: Negative for visual disturbance  Respiratory: Negative for cough and shortness of breath  Cardiovascular: Negative for chest pain, palpitations and leg swelling  Gastrointestinal: Negative for abdominal pain, blood in stool and diarrhea  Endocrine: Negative for polydipsia and polyphagia  Genitourinary: Negative for difficulty urinating and dysuria  Musculoskeletal: Negative for joint swelling, myalgias and neck stiffness  Skin: Negative for rash  Neurological: Negative for weakness, numbness and headaches  Hematological: Negative for adenopathy  Psychiatric/Behavioral: Negative for dysphoric mood  All other systems reviewed and are negative  Objective:    /78   Pulse 59   Ht 4' 9 8" (1 468 m)   Wt 98 7 kg (217 lb 8 oz)   LMP  (LMP Unknown)   SpO2 97%   BMI 45 77 kg/m²      Physical Exam   Constitutional: She appears well-developed  No distress  HENT:   Head: Normocephalic  Mouth/Throat: Oropharynx is clear and moist    B/l cerumen impaction   Eyes: Conjunctivae are normal    Neck: Neck supple  Cardiovascular: Normal rate and regular rhythm  Pulmonary/Chest: Effort normal  No respiratory distress  She has no wheezes  She has no rales  Abdominal: Soft  Bowel sounds are normal  She exhibits no distension  There is no tenderness  Musculoskeletal: She exhibits no tenderness  Lymphadenopathy:     She has no cervical adenopathy  Neurological: She is alert  No cranial nerve deficit  Skin: Skin is warm and dry  No rash noted  Psychiatric: She has a normal mood and affect  Vitals reviewed            Nikky Bailey MD

## 2019-02-11 ENCOUNTER — LAB (OUTPATIENT)
Dept: LAB | Facility: HOSPITAL | Age: 79
End: 2019-02-11
Attending: INTERNAL MEDICINE
Payer: MEDICARE

## 2019-02-11 DIAGNOSIS — D50.8 OTHER IRON DEFICIENCY ANEMIA: ICD-10-CM

## 2019-02-11 DIAGNOSIS — I10 ESSENTIAL HYPERTENSION: ICD-10-CM

## 2019-02-11 LAB
ALBUMIN SERPL BCP-MCNC: 3.5 G/DL (ref 3.5–5)
ALP SERPL-CCNC: 87 U/L (ref 46–116)
ALT SERPL W P-5'-P-CCNC: 29 U/L (ref 12–78)
ANION GAP SERPL CALCULATED.3IONS-SCNC: 7 MMOL/L (ref 4–13)
AST SERPL W P-5'-P-CCNC: 24 U/L (ref 5–45)
BILIRUB SERPL-MCNC: 0.5 MG/DL (ref 0.2–1)
BUN SERPL-MCNC: 26 MG/DL (ref 5–25)
CALCIUM SERPL-MCNC: 9 MG/DL (ref 8.3–10.1)
CHLORIDE SERPL-SCNC: 104 MMOL/L (ref 100–108)
CHOLEST SERPL-MCNC: 182 MG/DL (ref 50–200)
CO2 SERPL-SCNC: 31 MMOL/L (ref 21–32)
CREAT SERPL-MCNC: 0.97 MG/DL (ref 0.6–1.3)
ERYTHROCYTE [DISTWIDTH] IN BLOOD BY AUTOMATED COUNT: 12.5 % (ref 11.6–15.1)
FERRITIN SERPL-MCNC: 92 NG/ML (ref 8–388)
GFR SERPL CREATININE-BSD FRML MDRD: 55 ML/MIN/1.73SQ M
GLUCOSE P FAST SERPL-MCNC: 90 MG/DL (ref 65–99)
HCT VFR BLD AUTO: 41.3 % (ref 34.8–46.1)
HDLC SERPL-MCNC: 76 MG/DL (ref 40–60)
HGB BLD-MCNC: 13.2 G/DL (ref 11.5–15.4)
IRON SATN MFR SERPL: 28 %
IRON SERPL-MCNC: 88 UG/DL (ref 50–170)
LDLC SERPL CALC-MCNC: 96 MG/DL (ref 0–100)
MCH RBC QN AUTO: 31.2 PG (ref 26.8–34.3)
MCHC RBC AUTO-ENTMCNC: 32 G/DL (ref 31.4–37.4)
MCV RBC AUTO: 98 FL (ref 82–98)
NONHDLC SERPL-MCNC: 106 MG/DL
PLATELET # BLD AUTO: 187 THOUSANDS/UL (ref 149–390)
PMV BLD AUTO: 10.8 FL (ref 8.9–12.7)
POTASSIUM SERPL-SCNC: 4.2 MMOL/L (ref 3.5–5.3)
PROT SERPL-MCNC: 6.9 G/DL (ref 6.4–8.2)
RBC # BLD AUTO: 4.23 MILLION/UL (ref 3.81–5.12)
SODIUM SERPL-SCNC: 142 MMOL/L (ref 136–145)
TIBC SERPL-MCNC: 314 UG/DL (ref 250–450)
TRIGL SERPL-MCNC: 48 MG/DL
TSH SERPL DL<=0.05 MIU/L-ACNC: 3.44 UIU/ML (ref 0.36–3.74)
WBC # BLD AUTO: 4.2 THOUSAND/UL (ref 4.31–10.16)

## 2019-02-11 PROCEDURE — 84443 ASSAY THYROID STIM HORMONE: CPT

## 2019-02-11 PROCEDURE — 36415 COLL VENOUS BLD VENIPUNCTURE: CPT

## 2019-02-11 PROCEDURE — 80053 COMPREHEN METABOLIC PANEL: CPT

## 2019-02-11 PROCEDURE — 80061 LIPID PANEL: CPT

## 2019-02-11 PROCEDURE — 85027 COMPLETE CBC AUTOMATED: CPT

## 2019-02-11 PROCEDURE — 83550 IRON BINDING TEST: CPT

## 2019-02-11 PROCEDURE — 82728 ASSAY OF FERRITIN: CPT

## 2019-02-11 PROCEDURE — 83540 ASSAY OF IRON: CPT

## 2019-02-13 ENCOUNTER — OFFICE VISIT (OUTPATIENT)
Dept: GYNECOLOGIC ONCOLOGY | Facility: HOSPITAL | Age: 79
End: 2019-02-13
Payer: MEDICARE

## 2019-02-13 VITALS
HEIGHT: 57 IN | SYSTOLIC BLOOD PRESSURE: 124 MMHG | HEART RATE: 88 BPM | RESPIRATION RATE: 22 BRPM | TEMPERATURE: 97.5 F | WEIGHT: 215.8 LBS | BODY MASS INDEX: 46.56 KG/M2 | DIASTOLIC BLOOD PRESSURE: 82 MMHG

## 2019-02-13 DIAGNOSIS — Z85.42 HISTORY OF ENDOMETRIAL CANCER: Primary | ICD-10-CM

## 2019-02-13 DIAGNOSIS — F41.9 ANXIETY: ICD-10-CM

## 2019-02-13 PROBLEM — C54.1 ENDOMETRIAL CANCER (HCC): Status: RESOLVED | Noted: 2018-02-14 | Resolved: 2019-02-13

## 2019-02-13 PROCEDURE — 99213 OFFICE O/P EST LOW 20 MIN: CPT | Performed by: OBSTETRICS & GYNECOLOGY

## 2019-02-13 RX ORDER — FERROUS SULFATE 325(65) MG
1 TABLET ORAL
Refills: 1 | COMMUNITY
Start: 2018-11-21 | End: 2019-05-14 | Stop reason: ALTCHOICE

## 2019-02-13 NOTE — ASSESSMENT & PLAN NOTE
Referral to [facility]  She understands the benefit of counseling in addition to medical therapy  She will continue with 0 5 mg lorazepam for now

## 2019-02-13 NOTE — PROGRESS NOTES
Assessment/Plan:    Problem List Items Addressed This Visit        Other    History of endometrial cancer - Primary     Clinically without evidence of disease recurrence  Her performance status is 0   1  Return in 6 months for endometrial cancer surveillance  Anxiety     Referral to Gillian Hawley  She understands the benefit of counseling in addition to medical therapy  She will continue with 0 5 mg lorazepam for now  Relevant Orders    Ambulatory referral to Psychiatry            CHIEF COMPLAINT:  Endometrial cancer surveillance, anxiety      Problem:  Cancer Staging  History of endometrial cancer  Staging form: Corpus Uteri - Carcinoma, AJCC 8th Edition  - Clinical: FIGO Stage IB - Signed by Daryl Rodriguez PA-C on 2/14/2018        Previous therapy:     History of endometrial cancer    1/5/2016 Initial Diagnosis     Endometrial cancer ([facility], [place] [address] )         1/5/2016 Surgery     Robotic assisted total laparoscopic hysterectomy, bilateral salpingo-oophorectomy, bilateral pelvic lymph node dissection, excision of right pelvic peritoneum and repair of inherent vaginal laceration   A  19 of 23 mm depth of invasion, positive LVSI, 7 negative lymph nodes  B  Atypical peritoneal cytology with negative peritoneal biopsies            - 4/1/2016 Chemotherapy     Taxol 135 mg/m2 and carboplatin AUC 5 every 21 days  She completed 4 cycles  - 5/3/2016 Radiation     Vaginal brachytherapy              Patient ID: Teresa Laurent is a [de-identified] y o  female  64-year-old returns for endometrial cancer surveillance  She has no vaginal bleeding, abdominal pain, or pelvic pain  She has been anxious regarding aging and changes in her life related to aging and retiring from work  She is having difficulty sleeping due to her anxiety and occasionally takes a 0 5 mg of Ativan at night  She remains active  No other interval change in her medications or medical history since her last visit        The following portions of the patient's history were reviewed and updated as appropriate: allergies, current medications, past family history, past medical history, past social history, past surgical history and problem list     Review of Systems   Constitutional: Negative for activity change and unexpected weight change  HENT: Negative  Eyes: Negative  Respiratory: Negative  Cardiovascular: Negative  Gastrointestinal: Negative for abdominal distention and abdominal pain  Endocrine: Negative  Genitourinary: Negative for pelvic pain and vaginal bleeding  Musculoskeletal: Negative  Skin: Negative  Allergic/Immunologic: Negative  Neurological: Negative  Hematological: Negative  Psychiatric/Behavioral: Positive for sleep disturbance  The patient is nervous/anxious  Current Outpatient Medications   Medication Sig Dispense Refill    acetaminophen (TYLENOL) 325 mg tablet Take 650 mg by mouth every 4 (four) hours as needed for mild pain   atorvastatin (LIPITOR) 10 mg tablet Take 1 tablet (10 mg total) by mouth daily for 90 days 90 tablet 2    Calcium Carb-Cholecalciferol (CALCIUM 600 + D PO) Take 1 capsule by mouth daily   Cholecalciferol (VITAMIN D-3) 1000 UNITS CAPS Take 1 tablet by mouth daily   ferrous sulfate 324 (65 Fe) mg Take 1 tablet (324 mg total) by mouth daily before breakfast 90 tablet 2    ferrous sulfate 325 (65 Fe) mg tablet Take 1 tablet by mouth daily before breakfast  1    LORazepam (ATIVAN) 1 mg tablet Takes 1 whole tab at bedtime and a 1/2 tab if needed  135 tablet 0    metoprolol succinate (TOPROL-XL) 25 mg 24 hr tablet Take 1 tablet (25 mg total) by mouth daily for 90 days 90 tablet 2    mometasone (NASONEX) 50 mcg/act nasal spray 2 sprays into each nostril daily 1 Act 5    multivitamin (THERAGRAN) TABS Take 1 tablet by mouth daily        omeprazole (PriLOSEC) 20 mg delayed release capsule Take 1 capsule (20 mg total) by mouth daily 90 capsule 1    valsartan (DIOVAN) 320 MG tablet Take 1 tablet (320 mg total) by mouth daily for 90 days 90 tablet 2     No current facility-administered medications for this visit  Objective:    Blood pressure 124/82, pulse 88, temperature 97 5 °F (36 4 °C), temperature source Oral, resp  rate 22, height 4' 9" (1 448 m), weight 97 9 kg (215 lb 12 8 oz)  Body mass index is 46 7 kg/m²  Body surface area is 1 86 meters squared  Physical Exam   Constitutional: She is oriented to person, place, and time  She appears well-developed and well-nourished  No distress  HENT:   Head: Normocephalic and atraumatic  Neck: Normal range of motion  Neck supple  No thyromegaly present  Pulmonary/Chest: Effort normal    Abdominal: Soft  She exhibits no distension and no mass  There is no tenderness  There is no rebound and no guarding  Genitourinary:   Genitourinary Comments: The external female genitalia is normal  The bartholin's, uretheral and skenes glands are normal  The urethral meatus is normal (midline with no lesions)  Anus without fissure or lesion  Speculum exam reveals a grossly normal vagina  No masses, lesions,discharge or bleeding  No significant cystocele or rectocele noted  Bimanual exam notes a surgical absent cervix, uterus and adnexal structures  No masses or fullness  Bladder is without fullness, mass or tenderness  Musculoskeletal: She exhibits no edema  Lymphadenopathy:     She has no cervical adenopathy  Neurological: She is alert and oriented to person, place, and time  Skin: Skin is warm and dry  She is not diaphoretic  Psychiatric: Her behavior is normal  Judgment and thought content normal    Anxious

## 2019-02-14 ENCOUNTER — OFFICE VISIT (OUTPATIENT)
Dept: FAMILY MEDICINE CLINIC | Facility: HOSPITAL | Age: 79
End: 2019-02-14
Payer: MEDICARE

## 2019-02-14 VITALS
SYSTOLIC BLOOD PRESSURE: 112 MMHG | OXYGEN SATURATION: 98 % | HEIGHT: 57 IN | BODY MASS INDEX: 46.93 KG/M2 | HEART RATE: 62 BPM | DIASTOLIC BLOOD PRESSURE: 64 MMHG | WEIGHT: 217.5 LBS

## 2019-02-14 DIAGNOSIS — E66.01 MORBID OBESITY (HCC): ICD-10-CM

## 2019-02-14 DIAGNOSIS — I10 ESSENTIAL HYPERTENSION: Primary | ICD-10-CM

## 2019-02-14 DIAGNOSIS — R21 RASH AND NONSPECIFIC SKIN ERUPTION: ICD-10-CM

## 2019-02-14 DIAGNOSIS — F41.9 ANXIETY: ICD-10-CM

## 2019-02-14 DIAGNOSIS — K21.9 GASTROESOPHAGEAL REFLUX DISEASE WITHOUT ESOPHAGITIS: ICD-10-CM

## 2019-02-14 DIAGNOSIS — I71.2 ASCENDING AORTIC ANEURYSM (HCC): ICD-10-CM

## 2019-02-14 PROCEDURE — 99214 OFFICE O/P EST MOD 30 MIN: CPT | Performed by: INTERNAL MEDICINE

## 2019-02-14 RX ORDER — LORAZEPAM 0.5 MG/1
TABLET ORAL
Qty: 90 TABLET | Refills: 0
Start: 2019-02-14 | End: 2020-01-22 | Stop reason: SDUPTHER

## 2019-02-14 NOTE — PATIENT INSTRUCTIONS
Obesity   AMBULATORY CARE:   Obesity  is when your body mass index (BMI) is greater than 30  Your healthcare provider will use your height and weight to measure your BMI  The risks of obesity include  many health problems, such as injuries or physical disability  You may need tests to check for the following:  · Diabetes     · High blood pressure or high cholesterol     · Heart disease     · Gallbladder or liver disease     · Cancer of the colon, breast, prostate, liver, or kidney     · Sleep apnea     · Arthritis or gout  Seek care immediately if:   · You have a severe headache, confusion, or difficulty speaking  · You have weakness on one side of your body  · You have chest pain, sweating, or shortness of breath  Contact your healthcare provider if:   · You have symptoms of gallbladder or liver disease, such as pain in your upper abdomen  · You have knee or hip pain and discomfort while walking  · You have symptoms of diabetes, such as intense hunger and thirst, and frequent urination  · You have symptoms of sleep apnea, such as snoring or daytime sleepiness  · You have questions or concerns about your condition or care  Treatment for obesity  focuses on helping you lose weight to improve your health  Even a small decrease in BMI can reduce the risk for many health problems  Your healthcare provider will help you set a weight-loss goal   · Lifestyle changes  are the first step in treating obesity  These include making healthy food choices and getting regular physical activity  Your healthcare provider may suggest a weight-loss program that involves coaching, education, and therapy  · Medicine  may help you lose weight when it is used with a healthy diet and physical activity  · Surgery  can help you lose weight if you are very obese and have other health problems  There are several types of weight-loss surgery  Ask your healthcare provider for more information    Be successful losing weight:   · Set small, realistic goals  An example of a small goal is to walk for 20 minutes 5 days a week  Anther goal is to lose 5% of your body weight  · Tell friends, family members, and coworkers about your goals  and ask for their support  Ask a friend to lose weight with you, or join a weight-loss support group  · Identify foods or triggers that may cause you to overeat , and find ways to avoid them  Remove tempting high-calorie foods from your home and workplace  Place a bowl of fresh fruit on your kitchen counter  If stress causes you to eat, then find other ways to cope with stress  · Keep a diary to track what you eat and drink  Also write down how many minutes of physical activity you do each day  Weigh yourself once a week and record it in your diary  Eating changes: You will need to eat 500 to 1,000 fewer calories each day than you currently eat to lose 1 to 2 pounds a week  The following changes will help you cut calories:  · Eat smaller portions  Use small plates, no larger than 9 inches in diameter  Fill your plate half full of fruits and vegetables  Measure your food using measuring cups until you know what a serving size looks like  · Eat 3 meals and 1 or 2 snacks each day  Plan your meals in advance  [employer] and eat at home most of the time  Eat slowly  · Eat fruits and vegetables at every meal   They are low in calories and high in fiber, which makes you feel full  Do not add butter, margarine, or cream sauce to vegetables  Use herbs to season steamed vegetables  · Eat less fat and fewer fried foods  Eat more baked or grilled chicken and fish  These protein sources are lower in calories and fat than red meat  Limit fast food  Dress your salads with olive oil and vinegar instead of bottled dressing  · Limit the amount of sugar you eat  Do not drink sugary beverages  Limit alcohol  Activity changes:  Physical activity is good for your body in many ways   It helps you burn calories and build strong muscles  It decreases stress and depression, and improves your mood  It can also help you sleep better  Talk to your healthcare provider before you begin an exercise program   · Exercise for at least 30 minutes 5 days a week  Start slowly  Set aside time each day for physical activity that you enjoy and that is convenient for you  It is best to do both weight training and an activity that increases your heart rate, such as walking, bicycling, or swimming  · Find ways to be more active  Do yard work and housecleaning  Walk up the stairs instead of using elevators  Spend your leisure time going to events that require walking, such as outdoor festivals or fairs  This extra physical activity can help you lose weight and keep it off  Follow up with your healthcare provider as directed: You may need to meet with a dietitian  Write down your questions so you remember to ask them during your visits  © 2017 [address] [employer] Information is for End User's use only and may not be sold, redistributed or otherwise used for commercial purposes  All illustrations and images included in CareNotes® are the copyrighted property of [employer]  or Dawit Saucedo  The above information is an  only  It is not intended as medical advice for individual conditions or treatments  Talk to your doctor, nurse or pharmacist before following any medical regimen to see if it is safe and effective for you

## 2019-02-14 NOTE — ASSESSMENT & PLAN NOTE
Aneurysm size was stable last year, pt has no chest pain, will skip this year and will check size next year, pt was made aware to report any chest pain to me

## 2019-02-14 NOTE — PROGRESS NOTES
Assessment/Plan:    Essential hypertension  BP is well controlled on toprol and valsartan, continue the same, renal fxn is normal    Gastroesophageal reflux disease without esophagitis  Takes omeprazole once a week, denies gerd symptoms  Anxiety  Takes lorazepam 0 5 mg at bedtime prn, I changed the dose to 0 5 mg    Ascending aortic aneurysm (HCC)  Aneurysm size was stable last year, pt has no chest pain, will skip this year and will check size next year, pt was made aware to report any chest pain to me  Other hyperlipidemia  Lipids are stable on lipitor       Diagnoses and all orders for this visit:    Essential hypertension    Anxiety  -     LORazepam (ATIVAN) 0 5 mg tablet; 1 tablet as needed at bedtime    Gastroesophageal reflux disease without esophagitis    Ascending aortic aneurysm (HCC)    Rash and nonspecific skin eruption  -     Ambulatory referral to Dermatology; Future          Subjective:      Patient ID: Macario Yost is a [de-identified] y o  female  Hypertension   This is a chronic problem  The current episode started more than 1 year ago  The problem is unchanged  The problem is controlled  Pertinent negatives include no chest pain, headaches, palpitations or shortness of breath  The following portions of the patient's history were reviewed and updated as appropriate: current medications, past family history, past medical history, past social history, past surgical history and problem list     Review of Systems   Constitutional: Negative for fever  HENT: Negative for congestion  Eyes: Negative for visual disturbance  Respiratory: Negative for cough and shortness of breath  Cardiovascular: Negative for chest pain, palpitations and leg swelling  Gastrointestinal: Negative for abdominal pain, blood in stool and diarrhea  Endocrine: Negative for polydipsia and polyphagia  Genitourinary: Negative for difficulty urinating and dysuria     Musculoskeletal: Negative for joint swelling, myalgias and neck stiffness  Skin: Positive for rash  Neurological: Negative for weakness, numbness and headaches  Hematological: Negative for adenopathy  Psychiatric/Behavioral: Negative for dysphoric mood  All other systems reviewed and are negative  Objective:    /64   Pulse 62   Ht 4' 9" (1 448 m)   Wt 98 7 kg (217 lb 8 oz)   LMP  (LMP Unknown)   SpO2 98%   BMI 47 07 kg/m²      Physical Exam   Constitutional: She appears well-developed  No distress  HENT:   Head: Normocephalic  Mouth/Throat: Oropharynx is clear and moist    Eyes: Conjunctivae are normal    Neck: Neck supple  Cardiovascular: Normal rate and regular rhythm  Pulmonary/Chest: Effort normal  No respiratory distress  She has no wheezes  She has no rales  Abdominal: Soft  Bowel sounds are normal  She exhibits no distension  There is no tenderness  Musculoskeletal: She exhibits no tenderness  Lymphadenopathy:     She has no cervical adenopathy  Neurological: She is alert  No cranial nerve deficit  Skin: Skin is warm and dry  Rash (seborrheic keratosis vs basal cell ca on the left shoulder) noted  Psychiatric: She has a normal mood and affect  Vitals reviewed  Darlyn Guardado MD  BMI Counseling: Body mass index is 47 07 kg/m²  Discussed the patient's BMI with her  The BMI is above average  BMI counseling and education was provided to the patient  Nutrition recommendations include reducing portion sizes and decreasing overall calorie intake  I offered referral to weight management program that pt will consider

## 2019-04-11 DIAGNOSIS — J30.9 CHRONIC ALLERGIC RHINITIS: ICD-10-CM

## 2019-04-11 RX ORDER — MOMETASONE FUROATE 50 UG/1
2 SPRAY, METERED NASAL DAILY
Qty: 17 G | Refills: 5 | Status: SHIPPED | OUTPATIENT
Start: 2019-04-11 | End: 2019-05-14 | Stop reason: SDUPTHER

## 2019-05-14 ENCOUNTER — OFFICE VISIT (OUTPATIENT)
Dept: FAMILY MEDICINE CLINIC | Facility: HOSPITAL | Age: 79
End: 2019-05-14
Payer: MEDICARE

## 2019-05-14 VITALS
WEIGHT: 215 LBS | DIASTOLIC BLOOD PRESSURE: 68 MMHG | BODY MASS INDEX: 46.38 KG/M2 | HEART RATE: 81 BPM | SYSTOLIC BLOOD PRESSURE: 120 MMHG | HEIGHT: 57 IN | RESPIRATION RATE: 16 BRPM

## 2019-05-14 DIAGNOSIS — J30.9 CHRONIC ALLERGIC RHINITIS: ICD-10-CM

## 2019-05-14 DIAGNOSIS — Z00.00 MEDICARE ANNUAL WELLNESS VISIT, SUBSEQUENT: Primary | ICD-10-CM

## 2019-05-14 DIAGNOSIS — Z78.0 ASYMPTOMATIC POSTMENOPAUSAL STATE: ICD-10-CM

## 2019-05-14 DIAGNOSIS — J30.1 SEASONAL ALLERGIC RHINITIS DUE TO POLLEN: ICD-10-CM

## 2019-05-14 PROCEDURE — G0439 PPPS, SUBSEQ VISIT: HCPCS | Performed by: INTERNAL MEDICINE

## 2019-05-14 RX ORDER — MOMETASONE FUROATE 50 UG/1
2 SPRAY, METERED NASAL DAILY
Qty: 17 G | Refills: 5 | Status: SHIPPED | OUTPATIENT
Start: 2019-05-14 | End: 2020-09-28 | Stop reason: SDUPTHER

## 2019-06-06 DIAGNOSIS — E78.49 OTHER HYPERLIPIDEMIA: ICD-10-CM

## 2019-06-07 RX ORDER — ATORVASTATIN CALCIUM 10 MG/1
TABLET, FILM COATED ORAL
Qty: 90 TABLET | Refills: 2 | Status: SHIPPED | OUTPATIENT
Start: 2019-06-07 | End: 2019-09-03 | Stop reason: SDUPTHER

## 2019-07-22 ENCOUNTER — HOSPITAL ENCOUNTER (OUTPATIENT)
Dept: BONE DENSITY | Facility: IMAGING CENTER | Age: 79
Discharge: HOME | End: 2019-07-22
Payer: MEDICARE

## 2019-07-22 DIAGNOSIS — Z78.0 ASYMPTOMATIC POSTMENOPAUSAL STATE: ICD-10-CM

## 2019-07-22 PROCEDURE — 77080 DXA BONE DENSITY AXIAL: CPT

## 2019-08-13 ENCOUNTER — TELEPHONE (OUTPATIENT)
Dept: GYNECOLOGIC ONCOLOGY | Facility: CLINIC | Age: 79
End: 2019-08-13

## 2019-08-13 NOTE — TELEPHONE ENCOUNTER
Patient called today to cancel her appt with Dr Yelena Ty for 8/14  She didn't state why she was cancelling  I offered to reschedule but she declined

## 2019-09-03 DIAGNOSIS — E78.49 OTHER HYPERLIPIDEMIA: ICD-10-CM

## 2019-09-03 RX ORDER — ATORVASTATIN CALCIUM 10 MG/1
10 TABLET, FILM COATED ORAL DAILY
Qty: 90 TABLET | Refills: 2 | Status: SHIPPED | OUTPATIENT
Start: 2019-09-03 | End: 2020-05-26 | Stop reason: SDUPTHER

## 2019-09-17 ENCOUNTER — OFFICE VISIT (OUTPATIENT)
Dept: FAMILY MEDICINE CLINIC | Facility: HOSPITAL | Age: 79
End: 2019-09-17
Payer: MEDICARE

## 2019-09-17 VITALS
HEART RATE: 81 BPM | WEIGHT: 216 LBS | RESPIRATION RATE: 16 BRPM | BODY MASS INDEX: 46.6 KG/M2 | SYSTOLIC BLOOD PRESSURE: 118 MMHG | HEIGHT: 57 IN | DIASTOLIC BLOOD PRESSURE: 68 MMHG

## 2019-09-17 DIAGNOSIS — D50.8 OTHER IRON DEFICIENCY ANEMIA: ICD-10-CM

## 2019-09-17 DIAGNOSIS — F41.9 ANXIETY: ICD-10-CM

## 2019-09-17 DIAGNOSIS — K21.9 GASTROESOPHAGEAL REFLUX DISEASE WITHOUT ESOPHAGITIS: ICD-10-CM

## 2019-09-17 DIAGNOSIS — I10 ESSENTIAL HYPERTENSION: Primary | ICD-10-CM

## 2019-09-17 PROCEDURE — 99213 OFFICE O/P EST LOW 20 MIN: CPT | Performed by: INTERNAL MEDICINE

## 2019-09-17 RX ORDER — FERROUS SULFATE TAB EC 324 MG (65 MG FE EQUIVALENT) 324 (65 FE) MG
324 TABLET DELAYED RESPONSE ORAL
Qty: 90 TABLET | Refills: 3 | Status: SHIPPED | OUTPATIENT
Start: 2019-09-17 | End: 2020-09-28 | Stop reason: SDUPTHER

## 2019-09-17 RX ORDER — VALSARTAN 320 MG/1
320 TABLET ORAL DAILY
Qty: 90 TABLET | Refills: 3 | Status: SHIPPED | OUTPATIENT
Start: 2019-09-17 | End: 2020-09-28 | Stop reason: SDUPTHER

## 2019-09-17 RX ORDER — METOPROLOL SUCCINATE 25 MG/1
25 TABLET, EXTENDED RELEASE ORAL DAILY
Qty: 90 TABLET | Refills: 3 | Status: SHIPPED | OUTPATIENT
Start: 2019-09-17 | End: 2020-09-28 | Stop reason: SDUPTHER

## 2019-09-17 NOTE — PROGRESS NOTES
Assessment/Plan:    Essential hypertension  BP <130 at home  BP is well controlled  Will refill metoporolol diovan    Gastroesophageal reflux disease without esophagitis  Denies dysphagia or gerd    Anxiety  Pt takes lorazepam as needed when her anxiety increases, I offered SSRI/SNRI but pt had a bad experience on zoloft and doesn't wish to try another medication at this point  Last refill was in 3/2019  Made aware of side effects of falls and cognitive decline on lorazepam!       Diagnoses and all orders for this visit:    Essential hypertension  -     metoprolol succinate (TOPROL-XL) 25 mg 24 hr tablet; Take 1 tablet (25 mg total) by mouth daily  -     valsartan (DIOVAN) 320 MG tablet; Take 1 tablet (320 mg total) by mouth daily  -     Comprehensive metabolic panel; Future  -     CBC; Future  -     TSH, 3rd generation with Free T4 reflex; Future  -     Lipid panel; Future    Gastroesophageal reflux disease without esophagitis    Anxiety          Subjective:      Patient ID: Karen Camacho is a 78 y o  female  Hypertension   This is a chronic problem  The current episode started more than 1 year ago  The problem is unchanged  The problem is controlled  Pertinent negatives include no chest pain or shortness of breath  The following portions of the patient's history were reviewed and updated as appropriate: current medications, past family history, past medical history, past social history, past surgical history and problem list     Review of Systems   Constitutional: Negative for fever  Respiratory: Negative for cough and shortness of breath  Cardiovascular: Negative for chest pain  Gastrointestinal: Negative for abdominal pain, blood in stool and constipation  Genitourinary: Negative for dysuria     Musculoskeletal:        Intermittent back and left shoulder pain         Objective:    /68   Pulse 81   Resp 16   Ht 4' 9" (1 448 m)   Wt 98 kg (216 lb)   LMP  (LMP Unknown)   BMI 46 74 kg/m²      Physical Exam   Constitutional: She is oriented to person, place, and time  She appears well-developed  No distress  HENT:   Head: Normocephalic  Mouth/Throat: Oropharynx is clear and moist    Eyes: Conjunctivae are normal    Neck: Neck supple  Cardiovascular: Normal rate and regular rhythm  Pulmonary/Chest: Effort normal  No respiratory distress  She has no wheezes  She has no rales  Abdominal: Soft  Bowel sounds are normal  She exhibits no distension  There is no tenderness  Neurological: She is alert and oriented to person, place, and time  No cranial nerve deficit  Skin: Skin is warm and dry  Psychiatric: She has a normal mood and affect  Vitals reviewed            Fransisco Abdul MD

## 2019-09-17 NOTE — ASSESSMENT & PLAN NOTE
Pt takes lorazepam as needed when her anxiety increases, I offered SSRI/SNRI but pt had a bad experience on zoloft and doesn't wish to try another medication at this point  Last refill was in 3/2019      Made aware of side effects of falls and cognitive decline on lorazepam!

## 2019-09-30 ENCOUNTER — LAB (OUTPATIENT)
Dept: LAB | Facility: HOSPITAL | Age: 79
End: 2019-09-30
Attending: INTERNAL MEDICINE
Payer: MEDICARE

## 2019-09-30 DIAGNOSIS — I10 ESSENTIAL HYPERTENSION: ICD-10-CM

## 2019-09-30 LAB
ALBUMIN SERPL BCP-MCNC: 3.2 G/DL (ref 3.5–5)
ALP SERPL-CCNC: 81 U/L (ref 46–116)
ALT SERPL W P-5'-P-CCNC: 29 U/L (ref 12–78)
ANION GAP SERPL CALCULATED.3IONS-SCNC: 5 MMOL/L (ref 4–13)
AST SERPL W P-5'-P-CCNC: 24 U/L (ref 5–45)
BILIRUB SERPL-MCNC: 0.6 MG/DL (ref 0.2–1)
BUN SERPL-MCNC: 20 MG/DL (ref 5–25)
CALCIUM SERPL-MCNC: 8.9 MG/DL (ref 8.3–10.1)
CHLORIDE SERPL-SCNC: 106 MMOL/L (ref 100–108)
CO2 SERPL-SCNC: 30 MMOL/L (ref 21–32)
CREAT SERPL-MCNC: 0.9 MG/DL (ref 0.6–1.3)
ERYTHROCYTE [DISTWIDTH] IN BLOOD BY AUTOMATED COUNT: 12.5 % (ref 11.6–15.1)
GFR SERPL CREATININE-BSD FRML MDRD: 60 ML/MIN/1.73SQ M
GLUCOSE P FAST SERPL-MCNC: 97 MG/DL (ref 65–99)
HCT VFR BLD AUTO: 40.1 % (ref 34.8–46.1)
HGB BLD-MCNC: 12.4 G/DL (ref 11.5–15.4)
MCH RBC QN AUTO: 30.1 PG (ref 26.8–34.3)
MCHC RBC AUTO-ENTMCNC: 30.9 G/DL (ref 31.4–37.4)
MCV RBC AUTO: 97 FL (ref 82–98)
PLATELET # BLD AUTO: 192 THOUSANDS/UL (ref 149–390)
PMV BLD AUTO: 10.6 FL (ref 8.9–12.7)
POTASSIUM SERPL-SCNC: 4.2 MMOL/L (ref 3.5–5.3)
PROT SERPL-MCNC: 6.4 G/DL (ref 6.4–8.2)
RBC # BLD AUTO: 4.12 MILLION/UL (ref 3.81–5.12)
SODIUM SERPL-SCNC: 141 MMOL/L (ref 136–145)
TSH SERPL DL<=0.05 MIU/L-ACNC: 2.62 UIU/ML (ref 0.36–3.74)
WBC # BLD AUTO: 4.1 THOUSAND/UL (ref 4.31–10.16)

## 2019-09-30 PROCEDURE — 80053 COMPREHEN METABOLIC PANEL: CPT

## 2019-09-30 PROCEDURE — 84443 ASSAY THYROID STIM HORMONE: CPT

## 2019-09-30 PROCEDURE — 36415 COLL VENOUS BLD VENIPUNCTURE: CPT

## 2019-09-30 PROCEDURE — 85027 COMPLETE CBC AUTOMATED: CPT

## 2019-09-30 NOTE — RESULT ENCOUNTER NOTE
Call patient: Sam Osei normal electrolytes, blood sugar, liver function tests, renal function tests, red blood cell counts, thyroid function

## 2020-01-16 ENCOUNTER — LAB (OUTPATIENT)
Dept: LAB | Facility: HOSPITAL | Age: 80
End: 2020-01-16
Attending: INTERNAL MEDICINE
Payer: MEDICARE

## 2020-01-16 DIAGNOSIS — I10 ESSENTIAL HYPERTENSION: ICD-10-CM

## 2020-01-16 LAB
CHOLEST SERPL-MCNC: 185 MG/DL (ref 50–200)
HDLC SERPL-MCNC: 79 MG/DL
LDLC SERPL CALC-MCNC: 96 MG/DL (ref 0–100)
NONHDLC SERPL-MCNC: 106 MG/DL
TRIGL SERPL-MCNC: 52 MG/DL

## 2020-01-16 PROCEDURE — 80061 LIPID PANEL: CPT

## 2020-01-16 PROCEDURE — 36415 COLL VENOUS BLD VENIPUNCTURE: CPT

## 2020-01-22 ENCOUNTER — OFFICE VISIT (OUTPATIENT)
Dept: FAMILY MEDICINE CLINIC | Facility: HOSPITAL | Age: 80
End: 2020-01-22
Payer: MEDICARE

## 2020-01-22 VITALS
WEIGHT: 218 LBS | SYSTOLIC BLOOD PRESSURE: 122 MMHG | RESPIRATION RATE: 16 BRPM | HEIGHT: 57 IN | DIASTOLIC BLOOD PRESSURE: 76 MMHG | HEART RATE: 69 BPM | BODY MASS INDEX: 47.03 KG/M2

## 2020-01-22 DIAGNOSIS — I71.2 ASCENDING AORTIC ANEURYSM (HCC): ICD-10-CM

## 2020-01-22 DIAGNOSIS — E66.01 MORBID OBESITY WITH BMI OF 40.0-44.9, ADULT (HCC): ICD-10-CM

## 2020-01-22 DIAGNOSIS — I10 ESSENTIAL HYPERTENSION: Primary | ICD-10-CM

## 2020-01-22 DIAGNOSIS — F41.1 GAD (GENERALIZED ANXIETY DISORDER): ICD-10-CM

## 2020-01-22 DIAGNOSIS — F41.9 ANXIETY: ICD-10-CM

## 2020-01-22 DIAGNOSIS — K21.9 GASTROESOPHAGEAL REFLUX DISEASE WITHOUT ESOPHAGITIS: ICD-10-CM

## 2020-01-22 PROCEDURE — 99214 OFFICE O/P EST MOD 30 MIN: CPT | Performed by: INTERNAL MEDICINE

## 2020-01-22 RX ORDER — LORAZEPAM 0.5 MG/1
TABLET ORAL
Qty: 90 TABLET | Refills: 0 | Status: SHIPPED | OUTPATIENT
Start: 2020-01-22 | End: 2020-04-09 | Stop reason: SDUPTHER

## 2020-01-22 RX ORDER — OMEPRAZOLE 20 MG/1
20 CAPSULE, DELAYED RELEASE ORAL
Qty: 90 CAPSULE | Refills: 3 | Status: SHIPPED | OUTPATIENT
Start: 2020-01-22 | End: 2021-02-10 | Stop reason: SDUPTHER

## 2020-01-22 NOTE — ASSESSMENT & PLAN NOTE
Pt saw a counselor that helped  Take lorazepam sparingly, last filled in 03/2019 as [place]MP  We discussed potential side effects extensively such as falls, cognitive impairment, dependence  Pt took zoloft before and had nausea and insomnia, doesn't wish to try a new SSRI or SNRI

## 2020-01-22 NOTE — PROGRESS NOTES
Assessment/Plan:    Essential hypertension  BP is controlled  Continue metoprolol and valsartan  Will check labs    Gastroesophageal reflux disease without esophagitis  Denies dysphagia and gerd  Takes omeprazole    BRANDEE (generalized anxiety disorder)  Pt saw a counselor that helped  Take lorazepam sparingly, last filled in 03/2019 as [place]  We discussed potential side effects extensively such as falls, cognitive impairment, dependence  Pt took zoloft before and had nausea and insomnia, doesn't wish to try a new SSRI or SNRI  Ascending aortic aneurysm (HCC)  Ct chest showed 4 1 cm thoracic aortic aneurysm in 2/2018  Will repeat it  Continue toprol and lipitor       Diagnoses and all orders for this visit:    Essential hypertension  -     CBC; Future  -     Comprehensive metabolic panel; Future  -     TSH, 3rd generation with Free T4 reflex; Future    Gastroesophageal reflux disease without esophagitis  -     omeprazole (PriLOSEC) 20 mg delayed release capsule; Take 1 capsule (20 mg total) by mouth daily before breakfast    BRANDEE (generalized anxiety disorder)    Anxiety  -     LORazepam (ATIVAN) 0 5 mg tablet; 1 tablet as needed at bedtime    Ascending aortic aneurysm (HCC)  -     CT chest w contrast; Future    Morbid obesity with BMI of 40 0-44 9, adult ([facility])          Subjective:      Patient ID: Tad Turner is a 78 y o  female  Hypertension   This is a chronic problem  The current episode started more than 1 year ago  The problem is unchanged  The problem is controlled  Pertinent negatives include no chest pain, headaches, palpitations or shortness of breath  Heartburn   She reports no abdominal pain, no chest pain, no coughing, no dysphagia, no nausea, no sore throat or no wheezing  This is a recurrent problem  The current episode started more than 1 year ago  The problem occurs rarely  She has tried a PPI for the symptoms  The treatment provided significant relief           The following portions of the patient's history were reviewed and updated as appropriate: current medications, past family history, past medical history, past social history, past surgical history and problem list     Review of Systems   Constitutional: Negative for fever  HENT: Negative for congestion and sore throat  Eyes: Negative for visual disturbance  Respiratory: Negative for cough, shortness of breath and wheezing  Cardiovascular: Negative for chest pain, palpitations and leg swelling  Gastrointestinal: Negative for abdominal pain, blood in stool, diarrhea, dysphagia and nausea  Endocrine: Negative for polydipsia and polyphagia  Genitourinary: Negative for difficulty urinating and dysuria  Musculoskeletal: Negative for joint swelling, myalgias and neck stiffness  Skin: Negative for rash  Neurological: Negative for weakness, numbness and headaches  Hematological: Negative for adenopathy  Psychiatric/Behavioral: Negative for dysphoric mood  The patient is nervous/anxious  All other systems reviewed and are negative  Objective:    /76   Pulse 69   Resp 16   Ht 4' 9" (1 448 m)   Wt 98 9 kg (218 lb)   LMP  (LMP Unknown)   BMI 47 17 kg/m²      Physical Exam   Constitutional: She is oriented to person, place, and time  She appears well-developed  No distress  HENT:   Head: Normocephalic  Mouth/Throat: Oropharynx is clear and moist    Eyes: Conjunctivae are normal    Neck: Neck supple  Cardiovascular: Normal rate and regular rhythm  Pulmonary/Chest: Effort normal  No respiratory distress  She has no wheezes  She has no rales  Abdominal: Soft  Bowel sounds are normal  She exhibits no distension  There is no tenderness  Musculoskeletal: She exhibits no tenderness  Neurological: She is alert and oriented to person, place, and time  No cranial nerve deficit  Skin: Skin is warm and dry  No rash noted  Psychiatric: She has a normal mood and affect     Vitals reviewed  Jesus Fletcher MD  Depression Screening Follow-up Plan: Patient's depression screening was positive with a PHQ-2 score of 0  Their PHQ-9 score was   Clinically patient does not have depression  No treatment is required  BMI Counseling: Body mass index is 47 17 kg/m²  The BMI is above normal  Nutrition recommendations include reducing portion sizes

## 2020-01-24 ENCOUNTER — PATIENT OUTREACH (OUTPATIENT)
Dept: FAMILY MEDICINE CLINIC | Facility: HOSPITAL | Age: 80
End: 2020-01-24

## 2020-01-24 DIAGNOSIS — Z71.89 COMPLEX CARE COORDINATION: Primary | ICD-10-CM

## 2020-01-24 NOTE — PROGRESS NOTES
Spoke to Dr Juan Ferro 1/23/20  He asked me to reach out to [facility] to see if she has any needs  Spoke to [place]  I introduced myself and explained care management services  She declined care management services  She let me know that winter is a horrible time for her  She stated she misses the sun and going outside  She said she is a worrier and feels down at times, 1-2 days a week  She said she tries to keep her self busy because this helps and it helps to be with others  She also said she knits and quilts  She said she has an acquaintance that she talks with when she feels down  She declined talking with Velma Lanes or anyone else at this time but said she would keep that in mind  She said she has had a hard time adjusting to the changes to health care  She said the specific things that frustrate her are that things are online, computerized and are at a faster pace  I asked her to let me know if she needs help with any of this  She said she had a good conversation with Dr Juan Ferro yesterday and she feels comfortable talking to him  She also said her doctors have told her they would like her to loose weight  She said she would like to see a nutritionist if there is one in the [facility]  She agreed for me to help her with this  I let her know I would look into this and get back to her  She also said she would like information on assisted living facilities  She said she is able to take care of herself at home without  difficulty but would like to have a plan in place for when she does need more help  I gave her the name and contact information for [facility] and explained what they do  I gave her my contact information and let her know she can call with any questions or concerns  304 pm  Spoke to staff at [facility]  They let me know the pt can see a nutrionist at [facility]  She will need to call central scheduling once the order is placed to make an appointment   I sent an deanna message to Dr Sawyer Hankins requesting an order for a nutritionist and let him know why

## 2020-01-27 ENCOUNTER — PATIENT OUTREACH (OUTPATIENT)
Dept: FAMILY MEDICINE CLINIC | Facility: HOSPITAL | Age: 80
End: 2020-01-27

## 2020-01-27 NOTE — PROGRESS NOTES
Spoke to [place]  I gave her the contact information for central scheduling  I let her know she should let them know she is calling to schedule and appointment with a nutritionist at the [facility] location  I made her aware the order is in the computer and she will not need to take one with her  She verbalized understanding  She denied having any other needs  She said she has my contact information and I let her know she can call with any questions or concerns

## 2020-02-05 ENCOUNTER — LAB (OUTPATIENT)
Dept: LAB | Facility: HOSPITAL | Age: 80
End: 2020-02-05
Attending: INTERNAL MEDICINE
Payer: MEDICARE

## 2020-02-05 DIAGNOSIS — I10 ESSENTIAL HYPERTENSION: ICD-10-CM

## 2020-02-05 LAB
ALBUMIN SERPL BCP-MCNC: 3.4 G/DL (ref 3.5–5)
ALP SERPL-CCNC: 83 U/L (ref 46–116)
ALT SERPL W P-5'-P-CCNC: 25 U/L (ref 12–78)
ANION GAP SERPL CALCULATED.3IONS-SCNC: 5 MMOL/L (ref 4–13)
AST SERPL W P-5'-P-CCNC: 22 U/L (ref 5–45)
BILIRUB SERPL-MCNC: 0.59 MG/DL (ref 0.2–1)
BUN SERPL-MCNC: 18 MG/DL (ref 5–25)
CALCIUM SERPL-MCNC: 8.9 MG/DL (ref 8.3–10.1)
CHLORIDE SERPL-SCNC: 109 MMOL/L (ref 100–108)
CO2 SERPL-SCNC: 30 MMOL/L (ref 21–32)
CREAT SERPL-MCNC: 0.87 MG/DL (ref 0.6–1.3)
ERYTHROCYTE [DISTWIDTH] IN BLOOD BY AUTOMATED COUNT: 12.5 % (ref 11.6–15.1)
GFR SERPL CREATININE-BSD FRML MDRD: 63 ML/MIN/1.73SQ M
GLUCOSE P FAST SERPL-MCNC: 87 MG/DL (ref 65–99)
HCT VFR BLD AUTO: 41.7 % (ref 34.8–46.1)
HGB BLD-MCNC: 12.9 G/DL (ref 11.5–15.4)
MCH RBC QN AUTO: 30.4 PG (ref 26.8–34.3)
MCHC RBC AUTO-ENTMCNC: 30.9 G/DL (ref 31.4–37.4)
MCV RBC AUTO: 98 FL (ref 82–98)
PLATELET # BLD AUTO: 197 THOUSANDS/UL (ref 149–390)
PMV BLD AUTO: 10.7 FL (ref 8.9–12.7)
POTASSIUM SERPL-SCNC: 4 MMOL/L (ref 3.5–5.3)
PROT SERPL-MCNC: 6.8 G/DL (ref 6.4–8.2)
RBC # BLD AUTO: 4.25 MILLION/UL (ref 3.81–5.12)
SODIUM SERPL-SCNC: 144 MMOL/L (ref 136–145)
T4 FREE SERPL-MCNC: 1.12 NG/DL (ref 0.76–1.46)
TSH SERPL DL<=0.05 MIU/L-ACNC: 3.75 UIU/ML (ref 0.36–3.74)
WBC # BLD AUTO: 4.44 THOUSAND/UL (ref 4.31–10.16)

## 2020-02-05 PROCEDURE — 36415 COLL VENOUS BLD VENIPUNCTURE: CPT

## 2020-02-05 PROCEDURE — 80053 COMPREHEN METABOLIC PANEL: CPT

## 2020-02-05 PROCEDURE — 84443 ASSAY THYROID STIM HORMONE: CPT

## 2020-02-05 PROCEDURE — 85027 COMPLETE CBC AUTOMATED: CPT

## 2020-02-05 PROCEDURE — 84439 ASSAY OF FREE THYROXINE: CPT

## 2020-02-10 ENCOUNTER — TELEPHONE (OUTPATIENT)
Dept: FAMILY MEDICINE CLINIC | Facility: HOSPITAL | Age: 80
End: 2020-02-10

## 2020-04-09 DIAGNOSIS — F41.9 ANXIETY: ICD-10-CM

## 2020-04-09 RX ORDER — LORAZEPAM 0.5 MG/1
TABLET ORAL
Qty: 90 TABLET | Refills: 0 | Status: SHIPPED | OUTPATIENT
Start: 2020-04-09 | End: 2020-05-26 | Stop reason: SDUPTHER

## 2020-05-26 ENCOUNTER — OFFICE VISIT (OUTPATIENT)
Dept: FAMILY MEDICINE CLINIC | Facility: HOSPITAL | Age: 80
End: 2020-05-26
Payer: MEDICARE

## 2020-05-26 VITALS
SYSTOLIC BLOOD PRESSURE: 126 MMHG | RESPIRATION RATE: 16 BRPM | BODY MASS INDEX: 47.03 KG/M2 | DIASTOLIC BLOOD PRESSURE: 82 MMHG | HEIGHT: 57 IN | TEMPERATURE: 97.9 F | HEART RATE: 76 BPM | WEIGHT: 218 LBS

## 2020-05-26 DIAGNOSIS — Z13.1 SCREENING FOR DIABETES MELLITUS: ICD-10-CM

## 2020-05-26 DIAGNOSIS — F41.9 ANXIETY: ICD-10-CM

## 2020-05-26 DIAGNOSIS — I10 ESSENTIAL HYPERTENSION: Primary | ICD-10-CM

## 2020-05-26 DIAGNOSIS — E78.49 OTHER HYPERLIPIDEMIA: ICD-10-CM

## 2020-05-26 DIAGNOSIS — I71.2 ASCENDING AORTIC ANEURYSM (HCC): ICD-10-CM

## 2020-05-26 DIAGNOSIS — Z00.00 MEDICARE ANNUAL WELLNESS VISIT, SUBSEQUENT: ICD-10-CM

## 2020-05-26 DIAGNOSIS — F41.1 GAD (GENERALIZED ANXIETY DISORDER): ICD-10-CM

## 2020-05-26 PROCEDURE — 1036F TOBACCO NON-USER: CPT | Performed by: INTERNAL MEDICINE

## 2020-05-26 PROCEDURE — 3074F SYST BP LT 130 MM HG: CPT | Performed by: INTERNAL MEDICINE

## 2020-05-26 PROCEDURE — 1125F AMNT PAIN NOTED PAIN PRSNT: CPT | Performed by: INTERNAL MEDICINE

## 2020-05-26 PROCEDURE — 1170F FXNL STATUS ASSESSED: CPT | Performed by: INTERNAL MEDICINE

## 2020-05-26 PROCEDURE — 1160F RVW MEDS BY RX/DR IN RCRD: CPT | Performed by: INTERNAL MEDICINE

## 2020-05-26 PROCEDURE — 99213 OFFICE O/P EST LOW 20 MIN: CPT | Performed by: INTERNAL MEDICINE

## 2020-05-26 PROCEDURE — G0439 PPPS, SUBSEQ VISIT: HCPCS | Performed by: INTERNAL MEDICINE

## 2020-05-26 PROCEDURE — 3079F DIAST BP 80-89 MM HG: CPT | Performed by: INTERNAL MEDICINE

## 2020-05-26 PROCEDURE — 3008F BODY MASS INDEX DOCD: CPT | Performed by: INTERNAL MEDICINE

## 2020-05-26 RX ORDER — LORAZEPAM 1 MG/1
TABLET ORAL
Qty: 90 TABLET | Refills: 1 | Status: SHIPPED | OUTPATIENT
Start: 2020-05-26 | End: 2020-12-14 | Stop reason: SDUPTHER

## 2020-05-26 RX ORDER — ATORVASTATIN CALCIUM 10 MG/1
10 TABLET, FILM COATED ORAL DAILY
Qty: 90 TABLET | Refills: 3 | Status: SHIPPED | OUTPATIENT
Start: 2020-05-26 | End: 2021-06-03 | Stop reason: SDUPTHER

## 2020-06-04 ENCOUNTER — LAB (OUTPATIENT)
Dept: LAB | Facility: HOSPITAL | Age: 80
End: 2020-06-04
Attending: INTERNAL MEDICINE
Payer: MEDICARE

## 2020-06-04 DIAGNOSIS — Z13.1 SCREENING FOR DIABETES MELLITUS: ICD-10-CM

## 2020-06-04 DIAGNOSIS — I10 ESSENTIAL HYPERTENSION: ICD-10-CM

## 2020-06-04 LAB
ANION GAP SERPL CALCULATED.3IONS-SCNC: 5 MMOL/L (ref 4–13)
BUN SERPL-MCNC: 19 MG/DL (ref 5–25)
CALCIUM SERPL-MCNC: 9 MG/DL (ref 8.3–10.1)
CHLORIDE SERPL-SCNC: 106 MMOL/L (ref 100–108)
CO2 SERPL-SCNC: 29 MMOL/L (ref 21–32)
CREAT SERPL-MCNC: 0.84 MG/DL (ref 0.6–1.3)
GFR SERPL CREATININE-BSD FRML MDRD: 65 ML/MIN/1.73SQ M
GLUCOSE P FAST SERPL-MCNC: 95 MG/DL (ref 65–99)
POTASSIUM SERPL-SCNC: 4 MMOL/L (ref 3.5–5.3)
SODIUM SERPL-SCNC: 140 MMOL/L (ref 136–145)
T4 FREE SERPL-MCNC: 1.11 NG/DL (ref 0.76–1.46)
TSH SERPL DL<=0.05 MIU/L-ACNC: 4.32 UIU/ML (ref 0.36–3.74)

## 2020-06-04 PROCEDURE — 36415 COLL VENOUS BLD VENIPUNCTURE: CPT

## 2020-06-04 PROCEDURE — 80048 BASIC METABOLIC PNL TOTAL CA: CPT

## 2020-06-04 PROCEDURE — 84439 ASSAY OF FREE THYROXINE: CPT

## 2020-06-04 PROCEDURE — 84443 ASSAY THYROID STIM HORMONE: CPT

## 2020-09-28 ENCOUNTER — OFFICE VISIT (OUTPATIENT)
Dept: FAMILY MEDICINE CLINIC | Facility: HOSPITAL | Age: 80
End: 2020-09-28
Payer: MEDICARE

## 2020-09-28 VITALS
SYSTOLIC BLOOD PRESSURE: 126 MMHG | HEART RATE: 68 BPM | DIASTOLIC BLOOD PRESSURE: 64 MMHG | RESPIRATION RATE: 16 BRPM | HEIGHT: 57 IN | TEMPERATURE: 98.1 F | BODY MASS INDEX: 47.46 KG/M2 | WEIGHT: 220 LBS

## 2020-09-28 DIAGNOSIS — D50.8 OTHER IRON DEFICIENCY ANEMIA: ICD-10-CM

## 2020-09-28 DIAGNOSIS — J30.9 CHRONIC ALLERGIC RHINITIS: ICD-10-CM

## 2020-09-28 DIAGNOSIS — I71.2 ASCENDING AORTIC ANEURYSM (HCC): ICD-10-CM

## 2020-09-28 DIAGNOSIS — F41.1 GAD (GENERALIZED ANXIETY DISORDER): ICD-10-CM

## 2020-09-28 DIAGNOSIS — I10 ESSENTIAL HYPERTENSION: Primary | ICD-10-CM

## 2020-09-28 DIAGNOSIS — J30.1 SEASONAL ALLERGIC RHINITIS DUE TO POLLEN: ICD-10-CM

## 2020-09-28 PROCEDURE — 99214 OFFICE O/P EST MOD 30 MIN: CPT | Performed by: INTERNAL MEDICINE

## 2020-09-28 RX ORDER — FERROUS SULFATE TAB EC 324 MG (65 MG FE EQUIVALENT) 324 (65 FE) MG
324 TABLET DELAYED RESPONSE ORAL
Qty: 90 TABLET | Refills: 3 | Status: SHIPPED | OUTPATIENT
Start: 2020-09-28 | End: 2021-11-23 | Stop reason: SDUPTHER

## 2020-09-28 RX ORDER — VALSARTAN 320 MG/1
320 TABLET ORAL DAILY
Qty: 90 TABLET | Refills: 3 | Status: SHIPPED | OUTPATIENT
Start: 2020-09-28 | End: 2021-10-12 | Stop reason: ALTCHOICE

## 2020-09-28 RX ORDER — METOPROLOL SUCCINATE 25 MG/1
25 TABLET, EXTENDED RELEASE ORAL DAILY
Qty: 90 TABLET | Refills: 3 | Status: SHIPPED | OUTPATIENT
Start: 2020-09-28 | End: 2021-11-01 | Stop reason: SDUPTHER

## 2020-09-28 RX ORDER — MOMETASONE FUROATE 50 UG/1
2 SPRAY, METERED NASAL DAILY
Qty: 17 G | Refills: 5 | Status: SHIPPED | OUTPATIENT
Start: 2020-09-28 | End: 2022-03-29 | Stop reason: SDUPTHER

## 2020-09-28 NOTE — ASSESSMENT & PLAN NOTE
Allergies are stable  Continue nasonex
Anxiety is worse due to the covid pandemic  She last filled lorazepam on 9/19   [place] checked [place]  Lorazepam one daily controls the anxiety  I made pt aware of potential side effects and risks and she verbalized understanding
BP is stable  Continue toprol and valsartan
We discussed the last ct findings in 2018 that showed an ascending aneurysm of 41mm   I encouraged pt to get ct chest done that I ordered earlier
n/a

## 2020-09-28 NOTE — PROGRESS NOTES
Assessment/Plan:    Essential hypertension  BP is stable  Continue toprol and valsartan    BRANDEE (generalized anxiety disorder)  Anxiety is worse due to the covid pandemic  She last filled lorazepam on 9/19  I checked [place]  Lorazepam one daily controls the anxiety  I made pt aware of potential side effects and risks and she verbalized understanding    Seasonal allergic rhinitis due to pollen  Allergies are stable  Continue nasonex    Ascending aortic aneurysm ([address] )  We discussed the last ct findings in 2018 that showed an ascending aneurysm of 41mm  I encouraged pt to get ct chest done that I ordered earlier       Diagnoses and all orders for this visit:    Essential hypertension  -     metoprolol succinate (TOPROL-XL) 25 mg 24 hr tablet; Take 1 tablet (25 mg total) by mouth daily  -     valsartan (DIOVAN) 320 MG tablet; Take 1 tablet (320 mg total) by mouth daily    BRANDEE (generalized anxiety disorder)    Other iron deficiency anemia  -     ferrous sulfate 324 (65 Fe) mg; Take 1 tablet (324 mg total) by mouth daily before breakfast    Chronic allergic rhinitis  -     mometasone (NASONEX) 50 mcg/act nasal spray; 2 sprays into each nostril daily    Seasonal allergic rhinitis due to pollen    Ascending aortic aneurysm (HCC)          Subjective:      Patient ID: Samantha Escalante is a 78 y o  female  Hypertension   This is a chronic problem  The current episode started more than 1 year ago  The problem is unchanged  The problem is controlled  Pertinent negatives include no chest pain, headaches, palpitations or shortness of breath  The following portions of the patient's history were reviewed and updated as appropriate: current medications, past family history, past medical history, past social history, past surgical history and problem list     Review of Systems   Constitutional: Negative for fever  HENT: Negative for congestion  Eyes: Negative for visual disturbance     Respiratory: Negative for cough and shortness of breath  Cardiovascular: Negative for chest pain, palpitations and leg swelling  Gastrointestinal: Negative for abdominal pain, blood in stool and diarrhea  Endocrine: Negative for polydipsia and polyphagia  Genitourinary: Negative for dysuria, hematuria and vaginal bleeding  Musculoskeletal: Negative for myalgias  Skin: Negative for rash  Neurological: Negative for weakness, numbness and headaches  Psychiatric/Behavioral: Negative for dysphoric mood  The patient is nervous/anxious  All other systems reviewed and are negative  Objective:    /64   Pulse 68   Temp 98 1 °F (36 7 °C) (Tympanic)   Resp 16   Ht 4' 9" (1 448 m)   Wt 99 8 kg (220 lb)   LMP  (LMP Unknown)   BMI 47 61 kg/m²      Physical Exam  HENT:      Head: Normocephalic  Eyes:      Conjunctiva/sclera: Conjunctivae normal    Neck:      Musculoskeletal: Neck supple  Cardiovascular:      Rate and Rhythm: Normal rate and regular rhythm  Heart sounds: No murmur  Pulmonary:      Effort: No respiratory distress  Breath sounds: No wheezing or rales  Abdominal:      General: Bowel sounds are normal       Palpations: Abdomen is soft  Tenderness: There is no abdominal tenderness  Skin:     General: Skin is warm and dry  Neurological:      Mental Status: She is alert and oriented to person, place, and time  Cranial Nerves: No cranial nerve deficit     Psychiatric:         Mood and Affect: Mood normal              Ilia Osorio MD

## 2020-10-06 ENCOUNTER — TELEPHONE (OUTPATIENT)
Dept: FAMILY MEDICINE CLINIC | Facility: HOSPITAL | Age: 80
End: 2020-10-06

## 2020-10-06 DIAGNOSIS — I77.9 AORTIC DISEASE (HCC): ICD-10-CM

## 2020-10-06 DIAGNOSIS — I71.2 ASCENDING AORTIC ANEURYSM (HCC): Primary | ICD-10-CM

## 2020-10-06 DIAGNOSIS — I10 ESSENTIAL HYPERTENSION: Primary | ICD-10-CM

## 2020-10-07 ENCOUNTER — LAB (OUTPATIENT)
Dept: LAB | Facility: HOSPITAL | Age: 80
End: 2020-10-07
Attending: INTERNAL MEDICINE
Payer: MEDICARE

## 2020-10-07 DIAGNOSIS — I71.2 ASCENDING AORTIC ANEURYSM (HCC): ICD-10-CM

## 2020-10-07 DIAGNOSIS — I77.9 AORTIC DISEASE (HCC): ICD-10-CM

## 2020-10-07 LAB
ANION GAP SERPL CALCULATED.3IONS-SCNC: 2 MMOL/L (ref 4–13)
BUN SERPL-MCNC: 22 MG/DL (ref 5–25)
CALCIUM SERPL-MCNC: 8.8 MG/DL (ref 8.3–10.1)
CHLORIDE SERPL-SCNC: 107 MMOL/L (ref 100–108)
CO2 SERPL-SCNC: 32 MMOL/L (ref 21–32)
CREAT SERPL-MCNC: 0.9 MG/DL (ref 0.6–1.3)
GFR SERPL CREATININE-BSD FRML MDRD: 60 ML/MIN/1.73SQ M
GLUCOSE P FAST SERPL-MCNC: 96 MG/DL (ref 65–99)
POTASSIUM SERPL-SCNC: 4.1 MMOL/L (ref 3.5–5.3)
SODIUM SERPL-SCNC: 141 MMOL/L (ref 136–145)

## 2020-10-07 PROCEDURE — 36415 COLL VENOUS BLD VENIPUNCTURE: CPT

## 2020-10-07 PROCEDURE — 80048 BASIC METABOLIC PNL TOTAL CA: CPT

## 2020-10-08 ENCOUNTER — HOSPITAL ENCOUNTER (OUTPATIENT)
Dept: CT IMAGING | Facility: HOSPITAL | Age: 80
Discharge: HOME | End: 2020-10-08
Attending: INTERNAL MEDICINE
Payer: MEDICARE

## 2020-10-08 DIAGNOSIS — I71.2 ASCENDING AORTIC ANEURYSM (HCC): ICD-10-CM

## 2020-10-08 PROCEDURE — 71260 CT THORAX DX C+: CPT

## 2020-10-08 RX ADMIN — IOHEXOL 85 ML: 350 INJECTION, SOLUTION INTRAVENOUS at 11:17

## 2020-12-14 DIAGNOSIS — F41.9 ANXIETY: ICD-10-CM

## 2020-12-14 RX ORDER — LORAZEPAM 1 MG/1
TABLET ORAL
Qty: 90 TABLET | Refills: 1 | Status: SHIPPED | OUTPATIENT
Start: 2020-12-14 | End: 2021-06-03 | Stop reason: SDUPTHER

## 2021-02-10 ENCOUNTER — OFFICE VISIT (OUTPATIENT)
Dept: FAMILY MEDICINE CLINIC | Facility: HOSPITAL | Age: 81
End: 2021-02-10
Payer: MEDICARE

## 2021-02-10 VITALS
WEIGHT: 217 LBS | DIASTOLIC BLOOD PRESSURE: 84 MMHG | HEIGHT: 57 IN | BODY MASS INDEX: 46.82 KG/M2 | SYSTOLIC BLOOD PRESSURE: 128 MMHG | HEART RATE: 52 BPM | RESPIRATION RATE: 16 BRPM

## 2021-02-10 DIAGNOSIS — I10 ESSENTIAL HYPERTENSION: Primary | ICD-10-CM

## 2021-02-10 DIAGNOSIS — I71.2 ASCENDING AORTIC ANEURYSM (HCC): ICD-10-CM

## 2021-02-10 DIAGNOSIS — K21.9 GASTROESOPHAGEAL REFLUX DISEASE WITHOUT ESOPHAGITIS: ICD-10-CM

## 2021-02-10 DIAGNOSIS — D50.8 OTHER IRON DEFICIENCY ANEMIA: ICD-10-CM

## 2021-02-10 PROCEDURE — 99214 OFFICE O/P EST MOD 30 MIN: CPT | Performed by: INTERNAL MEDICINE

## 2021-02-10 RX ORDER — OMEPRAZOLE 20 MG/1
20 CAPSULE, DELAYED RELEASE ORAL
Qty: 90 CAPSULE | Refills: 3 | Status: SHIPPED | OUTPATIENT
Start: 2021-02-10 | End: 2022-02-24 | Stop reason: SDUPTHER

## 2021-02-10 NOTE — ASSESSMENT & PLAN NOTE
Patient has history of iron deficiency anemia  Denies signs of bleeding      Will check iron saturation before next visit, continue iron supplements in the meantime

## 2021-02-10 NOTE — PROGRESS NOTES
Assessment/Plan:    Gastroesophageal reflux disease without esophagitis  GERD is stable, denies dysphagia abdominal pain, signs of bleeding  Will continue omeprazole    Essential hypertension  Blood pressure is stable, will continue Toprol and losartan  Will check electrolytes and renal function before next visit    Iron deficiency anemia  Patient has history of iron deficiency anemia  Denies signs of bleeding  Will check iron saturation before next visit, continue iron supplements in the meantime       Diagnoses and all orders for this visit:    Essential hypertension  -     Comprehensive metabolic panel; Future  -     Lipid panel; Future  -     TSH, 3rd generation with Free T4 reflex; Future    Gastroesophageal reflux disease without esophagitis  -     omeprazole (PriLOSEC) 20 mg delayed release capsule; Take 1 capsule (20 mg total) by mouth daily before breakfast    Ascending aortic aneurysm (HCC)    Other iron deficiency anemia  -     CBC; Future  -     Iron Saturation %; Future          I counseled patient regarding the COVID vaccine at length! Subjective:      Patient ID: Sammy Montes is a 78 y o  female  Hypertension  This is a chronic problem  The current episode started more than 1 year ago  The problem is unchanged  The problem is controlled  Pertinent negatives include no chest pain or shortness of breath  The following portions of the patient's history were reviewed and updated as appropriate: current medications, past family history, past medical history, past social history, past surgical history and problem list     Review of Systems   Constitutional: Negative for fever  Respiratory: Negative for cough and shortness of breath  Cardiovascular: Negative for chest pain  Gastrointestinal: Negative for abdominal pain and blood in stool  Genitourinary: Negative for dysuria  Musculoskeletal: Negative for back pain and gait problem  Neurological: Negative for numbness  Psychiatric/Behavioral: Negative for dysphoric mood  Objective:    /84   Pulse (!) 52   Resp 16   Ht 4' 9" (1 448 m)   Wt 98 4 kg (217 lb)   LMP  (LMP Unknown)   BMI 46 96 kg/m²      Physical Exam  HENT:      Head: Normocephalic  Eyes:      Conjunctiva/sclera: Conjunctivae normal    Neck:      Musculoskeletal: Neck supple  Cardiovascular:      Rate and Rhythm: Normal rate and regular rhythm  Heart sounds: No murmur  Pulmonary:      Effort: No respiratory distress  Breath sounds: No wheezing or rales  Abdominal:      General: Bowel sounds are normal       Palpations: Abdomen is soft  Tenderness: There is no abdominal tenderness  Skin:     General: Skin is warm and dry  Neurological:      Mental Status: She is alert and oriented to person, place, and time  Cranial Nerves: No cranial nerve deficit     Psychiatric:         Mood and Affect: Mood normal              Sanjuana Haas MD

## 2021-02-10 NOTE — ASSESSMENT & PLAN NOTE
Blood pressure is stable, will continue Toprol and losartan      Will check electrolytes and renal function before next visit

## 2021-04-02 ENCOUNTER — IMMUNIZATIONS (OUTPATIENT)
Dept: FAMILY MEDICINE CLINIC | Facility: HOSPITAL | Age: 81
End: 2021-04-02

## 2021-04-02 DIAGNOSIS — Z23 ENCOUNTER FOR IMMUNIZATION: Primary | ICD-10-CM

## 2021-04-02 PROCEDURE — 91300 SARS-COV-2 / COVID-19 MRNA VACCINE (PFIZER-BIONTECH) 30 MCG: CPT

## 2021-04-02 PROCEDURE — 0001A SARS-COV-2 / COVID-19 MRNA VACCINE (PFIZER-BIONTECH) 30 MCG: CPT

## 2021-04-26 ENCOUNTER — IMMUNIZATIONS (OUTPATIENT)
Dept: FAMILY MEDICINE CLINIC | Facility: HOSPITAL | Age: 81
End: 2021-04-26

## 2021-04-26 DIAGNOSIS — Z23 ENCOUNTER FOR IMMUNIZATION: Primary | ICD-10-CM

## 2021-04-26 PROCEDURE — 91300 SARS-COV-2 / COVID-19 MRNA VACCINE (PFIZER-BIONTECH) 30 MCG: CPT

## 2021-04-26 PROCEDURE — 0002A SARS-COV-2 / COVID-19 MRNA VACCINE (PFIZER-BIONTECH) 30 MCG: CPT

## 2021-06-01 ENCOUNTER — LAB (OUTPATIENT)
Dept: LAB | Facility: HOSPITAL | Age: 81
End: 2021-06-01
Attending: INTERNAL MEDICINE
Payer: MEDICARE

## 2021-06-01 DIAGNOSIS — I10 ESSENTIAL HYPERTENSION: ICD-10-CM

## 2021-06-01 DIAGNOSIS — D50.8 OTHER IRON DEFICIENCY ANEMIA: ICD-10-CM

## 2021-06-01 LAB
ALBUMIN SERPL BCP-MCNC: 3.4 G/DL (ref 3.5–5)
ALP SERPL-CCNC: 89 U/L (ref 46–116)
ALT SERPL W P-5'-P-CCNC: 31 U/L (ref 12–78)
ANION GAP SERPL CALCULATED.3IONS-SCNC: 4 MMOL/L (ref 4–13)
AST SERPL W P-5'-P-CCNC: 28 U/L (ref 5–45)
BILIRUB SERPL-MCNC: 0.52 MG/DL (ref 0.2–1)
BUN SERPL-MCNC: 23 MG/DL (ref 5–25)
CALCIUM ALBUM COR SERPL-MCNC: 10 MG/DL (ref 8.3–10.1)
CALCIUM SERPL-MCNC: 9.5 MG/DL (ref 8.3–10.1)
CHLORIDE SERPL-SCNC: 108 MMOL/L (ref 100–108)
CHOLEST SERPL-MCNC: 188 MG/DL (ref 50–200)
CO2 SERPL-SCNC: 29 MMOL/L (ref 21–32)
CREAT SERPL-MCNC: 0.77 MG/DL (ref 0.6–1.3)
ERYTHROCYTE [DISTWIDTH] IN BLOOD BY AUTOMATED COUNT: 12.4 % (ref 11.6–15.1)
GFR SERPL CREATININE-BSD FRML MDRD: 72 ML/MIN/1.73SQ M
GLUCOSE P FAST SERPL-MCNC: 95 MG/DL (ref 65–99)
HCT VFR BLD AUTO: 41.8 % (ref 34.8–46.1)
HDLC SERPL-MCNC: 71 MG/DL
HGB BLD-MCNC: 13.2 G/DL (ref 11.5–15.4)
IRON SATN MFR SERPL: 25 %
IRON SERPL-MCNC: 80 UG/DL (ref 50–170)
LDLC SERPL CALC-MCNC: 104 MG/DL (ref 0–100)
MCH RBC QN AUTO: 30.8 PG (ref 26.8–34.3)
MCHC RBC AUTO-ENTMCNC: 31.6 G/DL (ref 31.4–37.4)
MCV RBC AUTO: 97 FL (ref 82–98)
NONHDLC SERPL-MCNC: 117 MG/DL
PLATELET # BLD AUTO: 207 THOUSANDS/UL (ref 149–390)
PMV BLD AUTO: 10.9 FL (ref 8.9–12.7)
POTASSIUM SERPL-SCNC: 4.1 MMOL/L (ref 3.5–5.3)
PROT SERPL-MCNC: 6.8 G/DL (ref 6.4–8.2)
RBC # BLD AUTO: 4.29 MILLION/UL (ref 3.81–5.12)
SODIUM SERPL-SCNC: 141 MMOL/L (ref 136–145)
T4 FREE SERPL-MCNC: 1.05 NG/DL (ref 0.76–1.46)
TIBC SERPL-MCNC: 314 UG/DL (ref 250–450)
TRIGL SERPL-MCNC: 65 MG/DL
TSH SERPL DL<=0.05 MIU/L-ACNC: 3.85 UIU/ML (ref 0.36–3.74)
WBC # BLD AUTO: 4.58 THOUSAND/UL (ref 4.31–10.16)

## 2021-06-01 PROCEDURE — 36415 COLL VENOUS BLD VENIPUNCTURE: CPT

## 2021-06-01 PROCEDURE — 84439 ASSAY OF FREE THYROXINE: CPT

## 2021-06-01 PROCEDURE — 84443 ASSAY THYROID STIM HORMONE: CPT

## 2021-06-01 PROCEDURE — 85027 COMPLETE CBC AUTOMATED: CPT

## 2021-06-01 PROCEDURE — 80061 LIPID PANEL: CPT

## 2021-06-01 PROCEDURE — 80053 COMPREHEN METABOLIC PANEL: CPT

## 2021-06-01 PROCEDURE — 83540 ASSAY OF IRON: CPT

## 2021-06-01 PROCEDURE — 83550 IRON BINDING TEST: CPT

## 2021-06-03 ENCOUNTER — OFFICE VISIT (OUTPATIENT)
Dept: FAMILY MEDICINE CLINIC | Facility: HOSPITAL | Age: 81
End: 2021-06-03
Payer: MEDICARE

## 2021-06-03 VITALS
SYSTOLIC BLOOD PRESSURE: 128 MMHG | BODY MASS INDEX: 47.03 KG/M2 | HEIGHT: 57 IN | DIASTOLIC BLOOD PRESSURE: 64 MMHG | RESPIRATION RATE: 16 BRPM | WEIGHT: 218 LBS | OXYGEN SATURATION: 96 % | HEART RATE: 74 BPM

## 2021-06-03 DIAGNOSIS — Z00.00 MEDICARE ANNUAL WELLNESS VISIT, SUBSEQUENT: Primary | ICD-10-CM

## 2021-06-03 DIAGNOSIS — F41.9 ANXIETY: ICD-10-CM

## 2021-06-03 DIAGNOSIS — E78.49 OTHER HYPERLIPIDEMIA: ICD-10-CM

## 2021-06-03 PROCEDURE — 1123F ACP DISCUSS/DSCN MKR DOCD: CPT | Performed by: INTERNAL MEDICINE

## 2021-06-03 PROCEDURE — G0439 PPPS, SUBSEQ VISIT: HCPCS | Performed by: INTERNAL MEDICINE

## 2021-06-03 RX ORDER — ATORVASTATIN CALCIUM 10 MG/1
10 TABLET, FILM COATED ORAL DAILY
Qty: 90 TABLET | Refills: 3 | Status: SHIPPED | OUTPATIENT
Start: 2021-06-03 | End: 2022-04-15

## 2021-06-03 RX ORDER — LORAZEPAM 1 MG/1
TABLET ORAL
Qty: 90 TABLET | Refills: 1 | Status: SHIPPED | OUTPATIENT
Start: 2021-06-03 | End: 2021-08-31 | Stop reason: SDUPTHER

## 2021-06-03 NOTE — PROGRESS NOTES
Assessment and Plan:     Problem List Items Addressed This Visit        Other    Other hyperlipidemia    Relevant Medications    atorvastatin (LIPITOR) 10 mg tablet      Other Visit Diagnoses     Medicare annual wellness visit, subsequent    -  Primary    Anxiety        Relevant Medications    LORazepam (ATIVAN) 1 mg tablet        BMI Counseling: Body mass index is 47 17 kg/m²  The BMI is above normal  Nutrition recommendations include decreasing portion sizes  Falls Plan of Care: balance, strength, and gait training instructions were provided  Preventive health issues were discussed with patient, and age appropriate screening tests were ordered as noted in patient's After Visit Summary  Personalized health advice and appropriate referrals for health education or preventive services given if needed, as noted in patient's After Visit Summary  History of Present Illness:     Patient presents for Welcome to Medicare visit  Patient Care Team:  Mushtaq Olson MD as PCP - MD Reinaldo Brady MD Morna Downing, MD     Review of Systems:     Review of Systems   Constitutional: Negative for fever  HENT: Negative for congestion  Eyes: Negative for visual disturbance  Respiratory: Negative for cough and shortness of breath  Cardiovascular: Negative for chest pain, palpitations and leg swelling  Gastrointestinal: Negative for abdominal pain, blood in stool and diarrhea  Endocrine: Negative for polydipsia  Genitourinary: Negative for dysuria, hematuria and vaginal bleeding  Musculoskeletal: Negative for joint swelling, myalgias and neck stiffness  Skin: Negative for rash  Neurological: Negative for weakness, numbness and headaches  Psychiatric/Behavioral: Negative for dysphoric mood  All other systems reviewed and are negative       Problem List:     Patient Active Problem List   Diagnosis    History of endometrial cancer    Iron deficiency anemia    Essential hypertension    Gastroesophageal reflux disease without esophagitis    Other hyperlipidemia    Seasonal allergic rhinitis due to pollen    Ascending aortic aneurysm (HCC)    Hiatal hernia    BRANDEE (generalized anxiety disorder)      Past Medical and Surgical History:     Past Medical History:   Diagnosis Date    Anxiety     Cataract     [facility]     Depression     GERD (gastroesophageal reflux disease)     Hearing loss on left     Hypertension     [facility]     Osteoarthritis     Retinopathy     TIA (transient ischemic attack)     2001     Past Surgical History:   Procedure Laterality Date    EYE SURGERY Left 10/2016    HYSTERECTOMY      LYMPH NODE BIOPSY      OOPHORECTOMY Bilateral     PELVIC LAPAROSCOPY        Family History:     Family History   Problem Relation Age of Onset    Breast cancer Sister     Breast cancer additional onset Sister     Prostate cancer Brother     Heart failure Mother     Anemia Mother     Alzheimer's disease Father     Substance Abuse Neg Hx         neg fam hx    Mental illness Neg Hx         neg fam hx      Social History:        Social History     Socioeconomic History    Marital status: Single     Spouse name: None    Number of children: None    Years of education: None    Highest education level: None   Occupational History    None   Social Needs    Financial resource strain: None    Food insecurity     Worry: None     Inability: None    Transportation needs     Medical: No     Non-medical: No   Tobacco Use    Smoking status: Never Smoker    Smokeless tobacco: Never Used   Substance and Sexual Activity    Alcohol use: Never     Frequency: Never    Drug use: No    Sexual activity: None   Lifestyle    Physical activity     Days per week: 0 days     Minutes per session: 0 min    Stress:  To some extent   Relationships    Social connections     Talks on phone: None     Gets together: None Attends Mor[employer] service: None     Active member of club or organization: None     Attends meetings of clubs or organizations: None     Relationship status: None    Intimate partner violence     Fear of current or ex partner: No     Emotionally abused: No     Physically abused: No     Forced sexual activity: No   Other Topics Concern    None   Social History Narrative    Uses seat belt    Caffeine use in the a m  P[employer]ism    Dental care, regularly     Exercise: yarkwork    Lives by [employer]  Feels safe at home  Medications and Allergies:     Current Outpatient Medications   Medication Sig Dispense Refill    acetaminophen (TYLENOL) 325 mg tablet Take 650 mg by mouth every 4 (four) hours as needed for mild pain   atorvastatin (LIPITOR) 10 mg tablet Take 1 tablet (10 mg total) by mouth daily 90 tablet 3    Calcium Carb-Cholecalciferol (CALCIUM 600 + D PO) Take 1 capsule by mouth daily   Cholecalciferol (VITAMIN D-3) 1000 UNITS CAPS Take 1 tablet by mouth daily   ferrous sulfate 324 (65 Fe) mg Take 1 tablet (324 mg total) by mouth daily before breakfast 90 tablet 3    LORazepam (ATIVAN) 1 mg tablet 1 tablet as needed at bedtime 90 tablet 1    metoprolol succinate (TOPROL-XL) 25 mg 24 hr tablet Take 1 tablet (25 mg total) by mouth daily 90 tablet 3    mometasone (NASONEX) 50 mcg/act nasal spray 2 sprays into each nostril daily 17 g 5    multivitamin (THERAGRAN) TABS Take 1 tablet by mouth daily   omeprazole (PriLOSEC) 20 mg delayed release capsule Take 1 capsule (20 mg total) by mouth daily before breakfast 90 capsule 3    valsartan (DIOVAN) 320 MG tablet Take 1 tablet (320 mg total) by mouth daily 90 tablet 3     No current facility-administered medications for this visit  Allergies   Allergen Reactions    Ace Inhibitors      Annotation - [id number]: COUGH    Aspirin GI Intolerance     Category: Adverse Reaction;  Annotation - [id number]: GI DISTRESS    Azithromycin Itching     Annotation - [id number]: PERIRECTAL IRRITATION    Flu Virus Vaccine       Immunizations:     Immunization History   Administered Date(s) Administered    SARS-CoV-2 / COVID-19 mRNA IM (Pfizer-BioNTmgMEDIA) 04/02/2021, 04/26/2021    TD (adult) Preservative Free 01/01/2004      Health Maintenance: There are no preventive care reminders to display for this patient  Topic Date Due    DTaP,Tdap,and Td Vaccines (1 - Tdap) 07/31/1961    Pneumococcal Vaccine: 65+ Years (1 of 1 - PPSV23) Never done    Influenza Vaccine (Season Ended) 09/01/2021      Medicare Screening Tests and Risk Assessments:     Malissa Nguyen is here for her Subsequent Wellness visit  Health Risk Assessment:   Patient rates overall health as good  Patient feels that their physical health rating is same  Eyesight was rated as same  Hearing was rated as same  Patient feels that their emotional and mental health rating is same  Patients states they are never, rarely angry  Patient states they are sometimes unusually tired/fatigued  Pain experienced in the last 7 days has been some  Patient's pain rating has been 4/10  Patient states that she has experienced no weight loss or gain in last 6 months  Fall Risk Screening: In the past year, patient has experienced: no history of falling in past year      Urinary Incontinence Screening:   Patient has leaked urine accidently in the last six months  Home Safety:  Patient does not have trouble with stairs inside or outside of their home  Patient has working smoke alarms and has working carbon monoxide detector  Home safety hazards include: none  Nutrition:   Pt doesn't want to answer this due to pandemic  Medications:   Patient is currently taking over-the-counter supplements  OTC medications include: see medication list  Patient is able to manage medications       Activities of Daily Living (ADLs)/Instrumental Activities of Daily Living (IADLs):   Walk and transfer into and out of bed and chair?: Yes  Dress and groom yourself?: Yes    Bathe or shower yourself?: Yes    Feed yourself? Yes  Do your laundry/housekeeping?: Yes  Manage your money, pay your bills and track your expenses?: Yes  Make your own meals?: Yes    Do your own shopping?: Yes    Previous Hospitalizations:   Any hospitalizations or ED visits within the last 12 months?: No      Advance Care Planning:   Living will: Yes    Advanced directive: Yes    Advanced directive counseling given: Yes    End of Life Decisions reviewed with patient: Yes      Comments: Full code, level 3 dnr in a case of permanent unconsciousness    Cognitive Screening:   Provider or family/friend/caregiver concerned regarding cognition?: No    PREVENTIVE SCREENINGS      Cardiovascular Screening:    General: Screening Not Indicated, History Lipid Disorder, Risks and Benefits Discussed and Screening Current      Diabetes Screening:     General: Screening Current and Risks and Benefits Discussed      Colorectal Cancer Screening:     General: Risks and Benefits Discussed and Screening Not Indicated      Breast Cancer Screening:     General: Risks and Benefits Discussed and Screening Not Indicated      Cervical Cancer Screening:    General: Screening Not Indicated      Lung Cancer Screening:     General: Screening Not Indicated    Screening, Brief Intervention, and Referral to Treatment (SBIRT)    Screening  Typical number of drinks in a day: 0  Typical number of drinks in a week: 0  Interpretation: Low risk drinking behavior  Single Item Drug Screening:  How often have you used an illegal drug (including marijuana) or a prescription medication for non-medical reasons in the past year? never    Single Item Drug Screen Score: 0  Interpretation: Negative screen for possible drug use disorder    Brief Intervention  Alcohol & drug use screenings were reviewed  No concerns regarding substance use disorder identified       Other Counseling Topics:   Regular weightbearing exercise  No exam data present     Physical Exam:     /64   Pulse 74   Resp 16   Ht 4' 9" (1 448 m)   Wt 98 9 kg (218 lb)   LMP  (LMP Unknown)   SpO2 96%   BMI 47 17 kg/m²     Physical Exam  Constitutional:       Appearance: She is well-developed  HENT:      Head: Normocephalic  Eyes:      Conjunctiva/sclera: Conjunctivae normal    Neck:      Musculoskeletal: Neck supple  Cardiovascular:      Rate and Rhythm: Normal rate and regular rhythm  Heart sounds: No murmur  Pulmonary:      Effort: Pulmonary effort is normal       Breath sounds: Normal breath sounds  Abdominal:      General: Bowel sounds are normal       Palpations: Abdomen is soft  Tenderness: There is no abdominal tenderness  Lymphadenopathy:      Cervical: No cervical adenopathy  Skin:     General: Skin is warm  Findings: No rash  Neurological:      Mental Status: She is alert and oriented to person, place, and time  Motor: No weakness        Gait: Gait normal    Psychiatric:         Mood and Affect: Mood normal           Miriam Miranda MD

## 2021-06-03 NOTE — PATIENT INSTRUCTIONS
Medicare Preventive Visit Patient Instructions  Thank you for completing your Welcome to Medicare Visit or Medicare Annual Wellness Visit today  Your next wellness visit will be due in one year (6/4/2022)  The screening/preventive services that you may require over the next 5-10 years are detailed below  Some tests may not apply to you based off risk factors and/or age  Screening tests ordered at today's visit but not completed yet may show as past due  Also, please note that scanned in results may not display below  Preventive Screenings:  Service Recommendations Previous Testing/Comments   Colorectal Cancer Screening  * Colonoscopy    * Fecal Occult Blood Test (FOBT)/Fecal Immunochemical Test (FIT)  * Fecal DNA/Cologuard Test  * Flexible Sigmoidoscopy Age: 54-65 years old   Colonoscopy: every 10 years (may be performed more frequently if at higher risk)  OR  FOBT/FIT: every 1 year  OR  Cologuard: every 3 years  OR  Sigmoidoscopy: every 5 years  Screening may be recommended earlier than age 48 if at higher risk for colorectal cancer  Also, an individualized decision between you and your healthcare provider will decide whether screening between the ages of 74-80 would be appropriate  Colonoscopy: 07/16/2018  FOBT/FIT: Not on file  Cologuard: Not on file  Sigmoidoscopy: Not on file          Breast Cancer Screening Age: 36 years old  Frequency: every 1-2 years  Not required if history of left and right mastectomy Mammogram: Not on file        Cervical Cancer Screening Between the ages of 21-29, pap smear recommended once every 3 years  Between the ages of 33-67, can perform pap smear with HPV co-testing every 5 years     Recommendations may differ for women with a history of total hysterectomy, cervical cancer, or abnormal pap smears in past  Pap Smear: Not on file    Screening Not Indicated   Hepatitis C Screening Once for adults born between [facility]  More frequently in patients at high risk for Hepatitis C Hep C Antibody: Not on file        Diabetes Screening 1-2 times per year if you're at risk for diabetes or have pre-diabetes Fasting glucose: 95 mg/dL   A1C: 5 2 %    Screening Current   Cholesterol Screening Once every 5 years if you don't have a lipid disorder  May order more often based on risk factors  Lipid panel: 06/01/2021    Screening Not Indicated  History Lipid Disorder     Other Preventive Screenings Covered by Medicare:  1  Abdominal Aortic Aneurysm (AAA) Screening: covered once if your at risk  You're considered to be at risk if you have a family history of AAA  2  Lung Cancer Screening: covers low dose CT scan once per year if you meet all of the following conditions: (1) Age 50-69; (2) No signs or symptoms of lung cancer; (3) Current smoker or have quit smoking within the last 15 years; (4) You have a tobacco smoking history of at least 30 pack years (packs per day multiplied by number of years you smoked); (5) You get a written order from a healthcare provider  3  Glaucoma Screening: covered annually if you're considered high risk: (1) You have diabetes OR (2) Family history of glaucoma OR (3)  aged 48 and older OR (3)  American aged 72 and older  3  Osteoporosis Screening: covered every 2 years if you meet one of the following conditions: (1) You're estrogen deficient and at risk for osteoporosis based off medical history and other findings; (2) Have a vertebral abnormality; (3) On glucocorticoid therapy for more than 3 months; (4) Have primary hyperparathyroidism; (5) On osteoporosis medications and need to assess response to drug therapy  · Last bone density test (DXA Scan): 07/22/2019   5  HIV Screening: covered annually if you're between the age of 15-65  Also covered annually if you are younger than 13 and older than 72 with risk factors for HIV infection  For pregnant patients, it is covered up to 3 times per pregnancy      Immunizations:  Immunization Recommendations   Influenza Vaccine Annual influenza vaccination during flu season is recommended for all persons aged >= 6 months who do not have contraindications   Pneumococcal Vaccine (Prevnar and Pneumovax)  * Prevnar = PCV13  * Pneumovax = PPSV23   Adults 25-60 years old: 1-3 doses may be recommended based on certain risk factors  Adults 72 years old: Prevnar (PCV13) vaccine recommended followed by Pneumovax (PPSV23) vaccine  If already received PPSV23 since turning 65, then PCV13 recommended at least one year after PPSV23 dose  Hepatitis B Vaccine 3 dose series if at intermediate or high risk (ex: diabetes, end stage renal disease, liver disease)   Tetanus (Td) Vaccine - COST NOT COVERED BY MEDICARE PART B Following completion of primary series, a booster dose should be given every 10 years to maintain immunity against tetanus  Td may also be given as tetanus wound prophylaxis  Tdap Vaccine - COST NOT COVERED BY MEDICARE PART B Recommended at least once for all adults  For pregnant patients, recommended with each pregnancy  Shingles Vaccine (Shingrix) - COST NOT COVERED BY MEDICARE PART B  2 shot series recommended in those aged 48 and above     Health Maintenance Due:  There are no preventive care reminders to display for this patient  Immunizations Due:      Topic Date Due    DTaP,Tdap,and Td Vaccines (1 - Tdap) 07/31/1961    Pneumococcal Vaccine: 65+ Years (1 of 1 - PPSV23) Never done    Influenza Vaccine (Season Ended) 09/01/2021     Advance Directives   What are advance directives? Advance directives are legal documents that state your wishes and plans for medical care  These plans are made ahead of time in case you lose your ability to make decisions for yourself  Advance directives can apply to any medical decision, such as the treatments you want, and if you want to donate organs  What are the types of advance directives?   There are many types of advance directives, and each state has rules about how to use them  You may choose a combination of any of the following:  · Living will: This is a written record of the treatment you want  You can also choose which treatments you do not want, which to limit, and which to stop at a certain time  This includes surgery, medicine, IV fluid, and tube feedings  · Durable power of  for healthcare [facility]): This is a written record that states who you want to make healthcare choices for you when you are unable to make them for yourself  This person, called a proxy, is usually a family member or a friend  You may choose more than 1 proxy  · Do not resuscitate (DNR) order:  A DNR order is used in case your heart stops beating or you stop breathing  It is a request not to have certain forms of treatment, such as CPR  A DNR order may be included in other types of advance directives  · Medical directive: This covers the care that you want if you are in a coma, near death, or unable to make decisions for yourself  You can list the treatments you want for each condition  Treatment may include pain medicine, surgery, blood transfusions, dialysis, IV or tube feedings, and a ventilator (breathing machine)  · Values history: This document has questions about your views, beliefs, and how you feel and think about life  This information can help others choose the care that you would choose  Why are advance directives important? An advance directive helps you control your care  Although spoken wishes may be used, it is better to have your wishes written down  Spoken wishes can be misunderstood, or not followed  Treatments may be given even if you do not want them  An advance directive may make it easier for your family to make difficult choices about your care  Urinary Incontinence   Urinary incontinence (UI)  is when you lose control of your bladder  UI develops because your bladder cannot store or empty urine properly   The 3 most common types of UI are stress incontinence, urge incontinence, or both  Medicines:   · May be given to help strengthen your bladder control  Report any side effects of medication to your healthcare provider  Do pelvic muscle exercises often:  Your pelvic muscles help you stop urinating  Squeeze these muscles tight for 5 seconds, then relax for 5 seconds  Gradually work up to squeezing for 10 seconds  Do 3 sets of 15 repetitions a day, or as directed  This will help strengthen your pelvic muscles and improve bladder control  Train your bladder:  Go to the bathroom at set times, such as every 2 hours, even if you do not feel the urge to go  You can also try to hold your urine when you feel the urge to go  For example, hold your urine for 5 minutes when you feel the urge to go  As that becomes easier, hold your urine for 10 minutes  Self-care:   · Keep a UI record  Write down how often you leak urine and how much you leak  Make a note of what you were doing when you leaked urine  · Drink liquids as directed  You may need to limit the amount of liquid you drink to help control your urine leakage  Do not drink any liquid right before you go to bed  Limit or do not have drinks that contain caffeine or alcohol  · Prevent constipation  Eat a variety of high-fiber foods  Good examples are high-fiber cereals, beans, vegetables, and whole-grain breads  Walking is the best way to trigger your intestines to have a bowel movement  · Exercise regularly and maintain a healthy weight  Weight loss and exercise will decrease pressure on your bladder and help you control your leakage  · Use a catheter as directed  to help empty your bladder  A catheter is a tiny, plastic tube that is put into your bladder to drain your urine  · Go to behavior therapy as directed  Behavior therapy may be used to help you learn to control your urge to urinate      Weight Management   Why it is important to manage your weight:  Being overweight increases your risk of health conditions such as heart disease, high blood pressure, type 2 diabetes, and certain types of cancer  It can also increase your risk for osteoarthritis, sleep apnea, and other respiratory problems  Aim for a slow, steady weight loss  Even a small amount of weight loss can lower your risk of health problems  How to lose weight safely:  A safe and healthy way to lose weight is to eat fewer calories and get regular exercise  You can lose up about 1 pound a week by decreasing the number of calories you eat by 500 calories each day  Healthy meal plan for weight management:  A healthy meal plan includes a variety of foods, contains fewer calories, and helps you stay healthy  A healthy meal plan includes the following:  · Eat whole-grain foods more often  A healthy meal plan should contain fiber  Fiber is the part of grains, fruits, and vegetables that is not broken down by your body  Whole-grain foods are healthy and provide extra fiber in your diet  Some examples of whole-grain foods are whole-wheat breads and pastas, oatmeal, brown rice, and bulgur  · Eat a variety of vegetables every day  Include dark, leafy greens such as spinach, kale, mary greens, and mustard greens  Eat yellow and orange vegetables such as carrots, sweet potatoes, and winter squash  · Eat a variety of fruits every day  Choose fresh or canned fruit (canned in its own juice or light syrup) instead of juice  Fruit juice has very little or no fiber  · Eat low-fat dairy foods  Drink fat-free (skim) milk or 1% milk  Eat fat-free yogurt and low-fat cottage cheese  Try low-fat cheeses such as mozzarella and other reduced-fat cheeses  · Choose meat and other protein foods that are low in fat  Choose beans or other legumes such as split peas or lentils  Choose fish, skinless poultry (chicken or turkey), or lean cuts of red meat (beef or pork)  Before you cook meat or poultry, cut off any visible fat  · Use less fat and oil  Try baking foods instead of frying them  Add less fat, such as margarine, sour cream, regular salad dressing and mayonnaise to foods  Eat fewer high-fat foods  Some examples of high-fat foods include french fries, doughnuts, ice cream, and cakes  · Eat fewer sweets  Limit foods and drinks that are high in sugar  This includes candy, cookies, regular soda, and sweetened drinks  Exercise:  Exercise at least 30 minutes per day on most days of the week  Some examples of exercise include walking, biking, dancing, and swimming  You can also fit in more physical activity by taking the stairs instead of the elevator or parking farther away from stores  Ask your healthcare provider about the best exercise plan for you  © Copyright [employer] 2018 Information is for End User's use only and may not be sold, redistributed or otherwise used for commercial purposes  All illustrations and images included in CareNotes® are the copyrighted property of [employer]  or [facility] Preventive Visit Patient Instructions  Thank you for completing your Welcome to Medicare Visit or Medicare Annual Wellness Visit today  Your next wellness visit will be due in one year (6/4/2022)  The screening/preventive services that you may require over the next 5-10 years are detailed below  Some tests may not apply to you based off risk factors and/or age  Screening tests ordered at today's visit but not completed yet may show as past due  Also, please note that scanned in results may not display below    Preventive Screenings:  Service Recommendations Previous Testing/Comments   Colorectal Cancer Screening  * Colonoscopy    * Fecal Occult Blood Test (FOBT)/Fecal Immunochemical Test (FIT)  * Fecal DNA/Cologuard Test  * Flexible Sigmoidoscopy Age: 54-65 years old   Colonoscopy: every 10 years (may be performed more frequently if at higher risk)  OR  FOBT/FIT: every 1 year  OR  Cologuard: every 3 years  OR  Sigmoidoscopy: every 5 years  Screening may be recommended earlier than age 48 if at higher risk for colorectal cancer  Also, an individualized decision between you and your healthcare provider will decide whether screening between the ages of 74-80 would be appropriate  Colonoscopy: 07/16/2018  FOBT/FIT: Not on file  Cologuard: Not on file  Sigmoidoscopy: Not on file          Breast Cancer Screening Age: 36 years old  Frequency: every 1-2 years  Not required if history of left and right mastectomy Mammogram: Not on file        Cervical Cancer Screening Between the ages of 21-29, pap smear recommended once every 3 years  Between the ages of 33-67, can perform pap smear with HPV co-testing every 5 years  Recommendations may differ for women with a history of total hysterectomy, cervical cancer, or abnormal pap smears in past  Pap Smear: Not on file    Screening Not Indicated   Hepatitis C Screening Once for adults born between 1945 and 1965  More frequently in patients at high risk for Hepatitis C Hep C Antibody: Not on file        Diabetes Screening 1-2 times per year if you're at risk for diabetes or have pre-diabetes Fasting glucose: 95 mg/dL   A1C: 5 2 %    Screening Current   Cholesterol Screening Once every 5 years if you don't have a lipid disorder  May order more often based on risk factors  Lipid panel: 06/01/2021    Screening Not Indicated  History Lipid Disorder     Other Preventive Screenings Covered by Medicare:  6  Abdominal Aortic Aneurysm (AAA) Screening: covered once if your at risk  You're considered to be at risk if you have a family history of AAA    7  Lung Cancer Screening: covers low dose CT scan once per year if you meet all of the following conditions: (1) Age 50-69; (2) No signs or symptoms of lung cancer; (3) Current smoker or have quit smoking within the last 15 years; (4) You have a tobacco smoking history of at least 30 pack years (packs per day multiplied by number of years you smoked); (5) You get a written order from a healthcare provider  8  Glaucoma Screening: covered annually if you're considered high risk: (1) You have diabetes OR (2) Family history of glaucoma OR (3)  aged 48 and older OR (3)  American aged 72 and older  5  Osteoporosis Screening: covered every 2 years if you meet one of the following conditions: (1) You're estrogen deficient and at risk for osteoporosis based off medical history and other findings; (2) Have a vertebral abnormality; (3) On glucocorticoid therapy for more than 3 months; (4) Have primary hyperparathyroidism; (5) On osteoporosis medications and need to assess response to drug therapy  · Last bone density test (DXA Scan): 07/22/2019   10  HIV Screening: covered annually if you're between the age of 15-65  Also covered annually if you are younger than 13 and older than 72 with risk factors for HIV infection  For pregnant patients, it is covered up to 3 times per pregnancy  Immunizations:  Immunization Recommendations   Influenza Vaccine Annual influenza vaccination during flu season is recommended for all persons aged >= 6 months who do not have contraindications   Pneumococcal Vaccine (Prevnar and Pneumovax)  * Prevnar = PCV13  * Pneumovax = PPSV23   Adults 25-60 years old: 1-3 doses may be recommended based on certain risk factors  Adults 72 years old: Prevnar (PCV13) vaccine recommended followed by Pneumovax (PPSV23) vaccine  If already received PPSV23 since turning 65, then PCV13 recommended at least one year after PPSV23 dose  Hepatitis B Vaccine 3 dose series if at intermediate or high risk (ex: diabetes, end stage renal disease, liver disease)   Tetanus (Td) Vaccine - COST NOT COVERED BY MEDICARE PART B Following completion of primary series, a booster dose should be given every 10 years to maintain immunity against tetanus  Td may also be given as tetanus wound prophylaxis     Tdap Vaccine - COST NOT COVERED BY MEDICARE PART B Recommended at least once for all adults  For pregnant patients, recommended with each pregnancy  Shingles Vaccine (Shingrix) - COST NOT COVERED BY MEDICARE PART B  2 shot series recommended in those aged 48 and above     Health Maintenance Due:  There are no preventive care reminders to display for this patient  Immunizations Due:      Topic Date Due    DTaP,Tdap,and Td Vaccines (1 - Tdap) 07[birth year]/1959    Pneumococcal Vaccine: 65+ Years (1 of 1 - PPSV23) Never done    Influenza Vaccine (Season Ended) 09/01/2021     Advance Directives   What are advance directives? Advance directives are legal documents that state your wishes and plans for medical care  These plans are made ahead of time in case you lose your ability to make decisions for yourself  Advance directives can apply to any medical decision, such as the treatments you want, and if you want to donate organs  What are the types of advance directives? There are many types of advance directives, and each state has rules about how to use them  You may choose a combination of any of the following:  · Living will: This is a written record of the treatment you want  You can also choose which treatments you do not want, which to limit, and which to stop at a certain time  This includes surgery, medicine, IV fluid, and tube feedings  · Durable power of  for healthcare [facility]): This is a written record that states who you want to make healthcare choices for you when you are unable to make them for yourself  This person, called a proxy, is usually a family member or a friend  You may choose more than 1 proxy  · Do not resuscitate (DNR) order:  A DNR order is used in case your heart stops beating or you stop breathing  It is a request not to have certain forms of treatment, such as CPR  A DNR order may be included in other types of advance directives  · Medical directive:   This covers the care that you want if you are in a coma, near death, or unable to make decisions for yourself  You can list the treatments you want for each condition  Treatment may include pain medicine, surgery, blood transfusions, dialysis, IV or tube feedings, and a ventilator (breathing machine)  · Values history: This document has questions about your views, beliefs, and how you feel and think about life  This information can help others choose the care that you would choose  Why are advance directives important? An advance directive helps you control your care  Although spoken wishes may be used, it is better to have your wishes written down  Spoken wishes can be misunderstood, or not followed  Treatments may be given even if you do not want them  An advance directive may make it easier for your family to make difficult choices about your care  Urinary Incontinence   Urinary incontinence (UI)  is when you lose control of your bladder  UI develops because your bladder cannot store or empty urine properly  The 3 most common types of UI are stress incontinence, urge incontinence, or both  Medicines:   · May be given to help strengthen your bladder control  Report any side effects of medication to your healthcare provider  Do pelvic muscle exercises often:  Your pelvic muscles help you stop urinating  Squeeze these muscles tight for 5 seconds, then relax for 5 seconds  Gradually work up to squeezing for 10 seconds  Do 3 sets of 15 repetitions a day, or as directed  This will help strengthen your pelvic muscles and improve bladder control  Train your bladder:  Go to the bathroom at set times, such as every 2 hours, even if you do not feel the urge to go  You can also try to hold your urine when you feel the urge to go  For example, hold your urine for 5 minutes when you feel the urge to go  As that becomes easier, hold your urine for 10 minutes  Self-care:   · Keep a UI record  Write down how often you leak urine and how much you leak   Make a note of what you were doing when you leaked urine  · Drink liquids as directed  You may need to limit the amount of liquid you drink to help control your urine leakage  Do not drink any liquid right before you go to bed  Limit or do not have drinks that contain caffeine or alcohol  · Prevent constipation  Eat a variety of high-fiber foods  Good examples are high-fiber cereals, beans, vegetables, and whole-grain breads  Walking is the best way to trigger your intestines to have a bowel movement  · Exercise regularly and maintain a healthy weight  Weight loss and exercise will decrease pressure on your bladder and help you control your leakage  · Use a catheter as directed  to help empty your bladder  A catheter is a tiny, plastic tube that is put into your bladder to drain your urine  · Go to behavior therapy as directed  Behavior therapy may be used to help you learn to control your urge to urinate  Weight Management   Why it is important to manage your weight:  Being overweight increases your risk of health conditions such as heart disease, high blood pressure, type 2 diabetes, and certain types of cancer  It can also increase your risk for osteoarthritis, sleep apnea, and other respiratory problems  Aim for a slow, steady weight loss  Even a small amount of weight loss can lower your risk of health problems  How to lose weight safely:  A safe and healthy way to lose weight is to eat fewer calories and get regular exercise  You can lose up about 1 pound a week by decreasing the number of calories you eat by 500 calories each day  Healthy meal plan for weight management:  A healthy meal plan includes a variety of foods, contains fewer calories, and helps you stay healthy  A healthy meal plan includes the following:  · Eat whole-grain foods more often  A healthy meal plan should contain fiber  Fiber is the part of grains, fruits, and vegetables that is not broken down by your body   Whole-grain foods are healthy and provide extra fiber in your diet  Some examples of whole-grain foods are whole-wheat breads and pastas, oatmeal, brown rice, and bulgur  · Eat a variety of vegetables every day  Include dark, leafy greens such as spinach, kale, mary greens, and mustard greens  Eat yellow and orange vegetables such as carrots, sweet potatoes, and winter squash  · Eat a variety of fruits every day  Choose fresh or canned fruit (canned in its own juice or light syrup) instead of juice  Fruit juice has very little or no fiber  · Eat low-fat dairy foods  Drink fat-free (skim) milk or 1% milk  Eat fat-free yogurt and low-fat cottage cheese  Try low-fat cheeses such as mozzarella and other reduced-fat cheeses  · Choose meat and other protein foods that are low in fat  Choose beans or other legumes such as split peas or lentils  Choose fish, skinless poultry (chicken or turkey), or lean cuts of red meat (beef or pork)  Before you cook meat or poultry, cut off any visible fat  · Use less fat and oil  Try baking foods instead of frying them  Add less fat, such as margarine, sour cream, regular salad dressing and mayonnaise to foods  Eat fewer high-fat foods  Some examples of high-fat foods include french fries, doughnuts, ice cream, and cakes  · Eat fewer sweets  Limit foods and drinks that are high in sugar  This includes candy, cookies, regular soda, and sweetened drinks  Exercise:  Exercise at least 30 minutes per day on most days of the week  Some examples of exercise include walking, biking, dancing, and swimming  You can also fit in more physical activity by taking the stairs instead of the elevator or parking farther away from stores  Ask your healthcare provider about the best exercise plan for you  © Copyright [employer] 2018 Information is for End User's use only and may not be sold, redistributed or otherwise used for commercial purposes   All illustrations and images included in CareNotes® are the copyrighted property of A D A M , Inc  or [address] [place] [address]

## 2021-08-10 ENCOUNTER — OFFICE VISIT (OUTPATIENT)
Dept: FAMILY MEDICINE CLINIC | Facility: HOSPITAL | Age: 81
End: 2021-08-10
Payer: MEDICARE

## 2021-08-10 VITALS
SYSTOLIC BLOOD PRESSURE: 178 MMHG | DIASTOLIC BLOOD PRESSURE: 94 MMHG | WEIGHT: 212.6 LBS | HEART RATE: 73 BPM | HEIGHT: 57 IN | BODY MASS INDEX: 45.86 KG/M2 | OXYGEN SATURATION: 97 %

## 2021-08-10 DIAGNOSIS — Z86.73 HISTORY OF STROKE: ICD-10-CM

## 2021-08-10 DIAGNOSIS — I10 ESSENTIAL HYPERTENSION: Primary | ICD-10-CM

## 2021-08-10 DIAGNOSIS — R19.5 CHANGE IN CONSISTENCY OF STOOL: ICD-10-CM

## 2021-08-10 DIAGNOSIS — I16.0 HYPERTENSIVE URGENCY: ICD-10-CM

## 2021-08-10 PROCEDURE — 99213 OFFICE O/P EST LOW 20 MIN: CPT | Performed by: STUDENT IN AN ORGANIZED HEALTH CARE EDUCATION/TRAINING PROGRAM

## 2021-08-10 RX ORDER — AMLODIPINE BESYLATE 5 MG/1
5 TABLET ORAL DAILY
Qty: 30 TABLET | Refills: 0 | Status: SHIPPED | OUTPATIENT
Start: 2021-08-10 | End: 2021-08-31 | Stop reason: SDUPTHER

## 2021-08-10 NOTE — PROGRESS NOTES
[facility],     Assessment/Plan:        Diagnosis ICD-10-CM Associated Orders   1  Essential hypertension  I10 Echo complete with contrast if indicated     Ambulatory referral to Cardiology     VAS renal artery complete     amLODIPine (NORVASC) 5 mg tablet   2  Change in consistency of stool  R19 5 Ambulatory referral to Gastroenterology   3  History of stroke  Z86 73 Echo complete with contrast if indicated     Ambulatory referral to Cardiology   4  Hypertensive urgency  I16 0 VAS renal artery complete     amLODIPine (NORVASC) 5 mg tablet      Norvasc 5 mg added to her daily regimen advised her to start that today  May also need to consider diuretic   Echo ordered,  Will assess and establish baseline  Prior cardiology records not in our system  Vascular study of her renal arteries ordered  Cardiology referral provided for local physician   Referral update provided for GI given her change in stools, with inconsistency   Return in about 1 week (around 8/17/2021) for Recheck HTN    ED precautions reviewed such as headache, blurred vision, dizziness, and severely elevated blood pressures   Patient may call or return to office with any questions or concerns  _________________________________________________________________________  Subjective:     Patient ID: Marko Setting is a 78 y o  female  HPI  Drew Setting   Chief Complaint   Patient presents with    Hypertension     PT felt dizzy when watering her flowers last night - checked her B/P ans it wa 199/90      Prior blood pressure readings in our office consistently 120s over 60s, 70s or 80s over the last year and a half  Weight has decreased from last September at 220 to now 212 today  No prior echocardiograms or EKGs in the chart  Patient prescribed valsartan 320 mg daily, and Toprol XL 25 mg daily  She is also on Lipitor 10 mg daily  Humidity really bothers her   Only dizzy for 1 minute while bending over to get hose off  No fall or syncope  Never had this episode before  Trying to lose weight  Mediterranean diet gave her a poor immune system  Feels stressed out and ankle swell up some  The following portions of the patient's history were reviewed and updated as appropriate: allergies, current medications, past medical history and problem list     Review of Systems   Constitutional: Negative for chills, diaphoresis, fatigue and fever  HENT: Negative for congestion and sore throat  Eyes: Negative for pain and redness  Respiratory: Negative for cough and shortness of breath  Cardiovascular: Negative for chest pain and palpitations  Gastrointestinal: Negative for nausea and vomiting  Solid stool, sometimes softer  On and off for few months  Genitourinary: Negative for frequency and urgency  Skin: Negative for pallor and rash  Neurological: Positive for dizziness  Negative for syncope and light-headedness  Objective:      Vitals:    08/10/21 1430   BP: (!) 178/94   Pulse: 73   SpO2: 97%      Repeat BP in right arm 192/102   Repeat BP and left arm with slightly larger cuff 190/94     Physical Exam  Vitals reviewed  Constitutional:       General: She is not in acute distress  Appearance: Normal appearance  She is well-developed  She is obese  She is not ill-appearing or diaphoretic  HENT:      Head: Normocephalic and atraumatic  Right Ear: Tympanic membrane, ear canal and external ear normal  There is no impacted cerumen  Left Ear: Tympanic membrane, ear canal and external ear normal  There is no impacted cerumen  Nose: Nose normal  No congestion or rhinorrhea  Mouth/Throat:      Mouth: Mucous membranes are moist       Pharynx: Oropharynx is clear  No oropharyngeal exudate  Eyes:      General: No scleral icterus  Right eye: No discharge  Left eye: No discharge     Cardiovascular:      Rate and Rhythm: Normal rate and regular rhythm  Pulses: Normal pulses  Heart sounds: Normal heart sounds  No murmur heard  No friction rub  No gallop  Pulmonary:      Effort: Pulmonary effort is normal  No respiratory distress  Breath sounds: Normal breath sounds  No stridor  No wheezing, rhonchi or rales  Abdominal:      General: There is no distension  Palpations: Abdomen is soft  Tenderness: There is no abdominal tenderness  Musculoskeletal:      Cervical back: Normal range of motion  Right lower leg: Edema present  Left lower leg: Edema present  Comments: Bilateral 1+ pitting edema into mid shins   Skin:     General: Skin is warm and dry  Capillary Refill: Capillary refill takes less than 2 seconds  Coloration: Skin is not pale  Neurological:      Mental Status: She is alert and oriented to person, place, and time  Coordination: Coordination normal       Gait: Gait normal    Psychiatric:         Mood and Affect: Mood normal          Behavior: Behavior normal          Thought Content: Thought content normal          Judgment: Judgment normal            Portions of the record may have been created with voice recognition software  Occasional wrong word or "sound alike" substitutions may have occurred due to the inherent limitations of voice recognition software  Please review the chart carefully and recognize, using context, where substitutions/typographical errors may have occurred

## 2021-08-13 ENCOUNTER — TELEPHONE (OUTPATIENT)
Dept: FAMILY MEDICINE CLINIC | Facility: HOSPITAL | Age: 81
End: 2021-08-13

## 2021-08-13 NOTE — TELEPHONE ENCOUNTER
Pt states she started new BP meds on Tuesday  Pt states her BP is going down - 149/88 pulse 77 as of this morning    Pt can be reached at [phone]

## 2021-08-17 ENCOUNTER — OFFICE VISIT (OUTPATIENT)
Dept: FAMILY MEDICINE CLINIC | Facility: HOSPITAL | Age: 81
End: 2021-08-17
Payer: MEDICARE

## 2021-08-17 VITALS
WEIGHT: 209.2 LBS | DIASTOLIC BLOOD PRESSURE: 82 MMHG | SYSTOLIC BLOOD PRESSURE: 145 MMHG | BODY MASS INDEX: 45.13 KG/M2 | HEART RATE: 75 BPM | OXYGEN SATURATION: 97 % | HEIGHT: 57 IN

## 2021-08-17 DIAGNOSIS — R19.5 CHANGE IN CONSISTENCY OF STOOL: ICD-10-CM

## 2021-08-17 DIAGNOSIS — I10 ESSENTIAL HYPERTENSION: Primary | ICD-10-CM

## 2021-08-17 DIAGNOSIS — E66.01 MORBID OBESITY WITH BMI OF 45.0-49.9, ADULT (HCC): ICD-10-CM

## 2021-08-17 PROCEDURE — 99214 OFFICE O/P EST MOD 30 MIN: CPT | Performed by: STUDENT IN AN ORGANIZED HEALTH CARE EDUCATION/TRAINING PROGRAM

## 2021-08-17 NOTE — PROGRESS NOTES
[facility],     Assessment/Plan:      Diagnosis ICD-10-CM Associated Orders   1  Essential hypertension  I10    2  Morbid obesity with BMI of 45 0-49 9, adult ([facility] [address] )  E66 01     Z68 42    3  Change in consistency of stool  R19 5       BP much better  Since starting amlodipine  Patient does not note ankle swelling  She is entirely asymptomatic  Counseled on continuing to avoid hot /humid weather, drinking plenty of water, and limiting salt  She has a cardiology appointment coming up soon, as well as both echocardiogram and renal duplex studies  I will call with the results   Appointment with Dr Bobbi Min in October on the 12th   At that time discussed repeat iron testing & ferritin, as patient has discontinued her ferrous supplement due to ongoing GI concerns   Patient may call or return to office with any questions or concerns  ___________________________________________________________________  Subjective:     Patient ID: Kar Mane is a 78 y o  female  HPI  Kar Mane  Is an 17-year-old female here today for follow-up of her hypertension  She was seen 1 week ago with greatly increased blood pressures, and amlodipine 5 mg was added to her daily regimen  She was also provided with a cardiology referral, and order for echo, and order for renal artery duplex scan  Ankles get swelling by the end of the day  Vascular duplex study ordered for 9/9/21  Echocardiogram scheduled for 8/31/21    Cardiology appt scheduled for 8/30/21  Eye appt next week  Patient also was having irregular stools, sometimes loose sometimes diarrhea, and sometimes solid  She discontinued her iron supplement at this time, to eliminate a possible variable  She has been off of that for 2 days and has had solid stool since  Has a GI appointment coming up soon      The following portions of the patient's history were reviewed and updated as appropriate: allergies, current medications, past medical history and problem list     Review of Systems   Constitutional: Negative for chills and fever  Eyes: Negative for photophobia, pain, redness and visual disturbance  Respiratory: Negative for cough and shortness of breath  Cardiovascular: Negative for chest pain, palpitations and leg swelling  Neurological: Negative for dizziness, light-headedness and headaches  Objective:      Vitals:    08/17/21 1300   BP: 134/86   Pulse: 75   SpO2: 97%       Vitals:    08/17/21 1338   BP: 145/82   Pulse:    SpO2:         Physical Exam  Vitals reviewed  Constitutional:       General: She is not in acute distress  Appearance: Normal appearance  She is well-developed  She is obese  She is not ill-appearing  HENT:      Head: Normocephalic and atraumatic  Eyes:      General: No scleral icterus  Right eye: No discharge  Left eye: No discharge  Cardiovascular:      Rate and Rhythm: Normal rate  Pulses: Normal pulses  Pulmonary:      Effort: Pulmonary effort is normal  No respiratory distress  Breath sounds: No stridor  Musculoskeletal:      Cervical back: Normal range of motion  Right lower leg: Edema present  Left lower leg: Edema present  Comments: B/L pitting edema to lower shins 1+   Skin:     General: Skin is warm and dry  Coloration: Skin is not pale  Findings: No erythema or rash  Neurological:      Mental Status: She is alert and oriented to person, place, and time  Gait: Gait normal    Psychiatric:         Mood and Affect: Mood normal          Behavior: Behavior normal          Thought Content: Thought content normal          Judgment: Judgment normal            Portions of the record may have been created with voice recognition software  Occasional wrong word or "sound alike" substitutions may have occurred due to the inherent limitations of voice recognition software   Please review the chart carefully and recognize, using context, where substitutions/typographical errors may have occurred

## 2021-08-31 ENCOUNTER — HOSPITAL ENCOUNTER (OUTPATIENT)
Dept: NON INVASIVE DIAGNOSTICS | Age: 81
Discharge: HOME | End: 2021-08-31
Payer: MEDICARE

## 2021-08-31 DIAGNOSIS — F41.9 ANXIETY: ICD-10-CM

## 2021-08-31 DIAGNOSIS — Z86.73 HISTORY OF STROKE: ICD-10-CM

## 2021-08-31 DIAGNOSIS — I16.0 HYPERTENSIVE URGENCY: ICD-10-CM

## 2021-08-31 DIAGNOSIS — I10 ESSENTIAL HYPERTENSION: ICD-10-CM

## 2021-08-31 PROCEDURE — 93306 TTE W/DOPPLER COMPLETE: CPT

## 2021-08-31 PROCEDURE — 93306 TTE W/DOPPLER COMPLETE: CPT | Performed by: INTERNAL MEDICINE

## 2021-09-01 RX ORDER — AMLODIPINE BESYLATE 5 MG/1
5 TABLET ORAL DAILY
Qty: 30 TABLET | Refills: 0 | Status: SHIPPED | OUTPATIENT
Start: 2021-09-01 | End: 2021-10-05 | Stop reason: SDUPTHER

## 2021-09-01 RX ORDER — LORAZEPAM 1 MG/1
TABLET ORAL
Qty: 90 TABLET | Refills: 1 | Status: SHIPPED | OUTPATIENT
Start: 2021-09-01 | End: 2022-02-24 | Stop reason: SDUPTHER

## 2021-09-09 ENCOUNTER — HOSPITAL ENCOUNTER (OUTPATIENT)
Dept: NON INVASIVE DIAGNOSTICS | Age: 81
Discharge: HOME | End: 2021-09-09
Payer: MEDICARE

## 2021-09-09 DIAGNOSIS — I10 ESSENTIAL HYPERTENSION: ICD-10-CM

## 2021-09-09 DIAGNOSIS — I16.0 HYPERTENSIVE URGENCY: ICD-10-CM

## 2021-09-09 PROCEDURE — 93975 VASCULAR STUDY: CPT

## 2021-09-09 PROCEDURE — 93975 VASCULAR STUDY: CPT | Performed by: SURGERY

## 2021-10-05 ENCOUNTER — TELEPHONE (OUTPATIENT)
Dept: FAMILY MEDICINE CLINIC | Facility: HOSPITAL | Age: 81
End: 2021-10-05

## 2021-10-05 DIAGNOSIS — I16.0 HYPERTENSIVE URGENCY: ICD-10-CM

## 2021-10-05 DIAGNOSIS — I10 ESSENTIAL HYPERTENSION: ICD-10-CM

## 2021-10-05 RX ORDER — AMLODIPINE BESYLATE 5 MG/1
5 TABLET ORAL DAILY
Qty: 30 TABLET | Refills: 5 | Status: SHIPPED | OUTPATIENT
Start: 2021-10-05 | End: 2021-10-18 | Stop reason: SINTOL

## 2021-10-12 ENCOUNTER — OFFICE VISIT (OUTPATIENT)
Dept: CARDIOLOGY CLINIC | Facility: CLINIC | Age: 81
End: 2021-10-12
Payer: MEDICARE

## 2021-10-12 VITALS
WEIGHT: 215.6 LBS | DIASTOLIC BLOOD PRESSURE: 82 MMHG | BODY MASS INDEX: 46.51 KG/M2 | HEART RATE: 70 BPM | HEIGHT: 57 IN | SYSTOLIC BLOOD PRESSURE: 150 MMHG

## 2021-10-12 DIAGNOSIS — E78.49 OTHER HYPERLIPIDEMIA: ICD-10-CM

## 2021-10-12 DIAGNOSIS — Q25.40 ABNORMALITY OF THORACIC AORTA: ICD-10-CM

## 2021-10-12 DIAGNOSIS — I10 ESSENTIAL HYPERTENSION: ICD-10-CM

## 2021-10-12 DIAGNOSIS — I10 HYPERTENSION, UNSPECIFIED TYPE: Primary | ICD-10-CM

## 2021-10-12 DIAGNOSIS — I63.9 CEREBROVASCULAR ACCIDENT (CVA), UNSPECIFIED MECHANISM (HCC): ICD-10-CM

## 2021-10-12 DIAGNOSIS — Z86.73 HISTORY OF STROKE: ICD-10-CM

## 2021-10-12 PROCEDURE — 99205 OFFICE O/P NEW HI 60 MIN: CPT | Performed by: INTERNAL MEDICINE

## 2021-10-12 PROCEDURE — 93000 ELECTROCARDIOGRAM COMPLETE: CPT | Performed by: INTERNAL MEDICINE

## 2021-10-18 ENCOUNTER — OFFICE VISIT (OUTPATIENT)
Dept: FAMILY MEDICINE CLINIC | Facility: HOSPITAL | Age: 81
End: 2021-10-18
Payer: MEDICARE

## 2021-10-18 VITALS
RESPIRATION RATE: 16 BRPM | HEIGHT: 57 IN | WEIGHT: 214 LBS | OXYGEN SATURATION: 96 % | DIASTOLIC BLOOD PRESSURE: 78 MMHG | SYSTOLIC BLOOD PRESSURE: 148 MMHG | BODY MASS INDEX: 46.17 KG/M2 | HEART RATE: 64 BPM

## 2021-10-18 DIAGNOSIS — D50.8 OTHER IRON DEFICIENCY ANEMIA: ICD-10-CM

## 2021-10-18 DIAGNOSIS — I10 ESSENTIAL HYPERTENSION: Primary | ICD-10-CM

## 2021-10-18 PROCEDURE — 99214 OFFICE O/P EST MOD 30 MIN: CPT | Performed by: INTERNAL MEDICINE

## 2021-10-18 RX ORDER — VALSARTAN 320 MG/1
320 TABLET ORAL DAILY
Qty: 90 TABLET | Refills: 3
Start: 2021-10-18 | End: 2021-11-23 | Stop reason: SDUPTHER

## 2021-11-01 ENCOUNTER — LAB (OUTPATIENT)
Dept: LAB | Facility: HOSPITAL | Age: 81
End: 2021-11-01
Attending: INTERNAL MEDICINE
Payer: MEDICARE

## 2021-11-01 ENCOUNTER — TELEPHONE (OUTPATIENT)
Dept: FAMILY MEDICINE CLINIC | Facility: HOSPITAL | Age: 81
End: 2021-11-01

## 2021-11-01 DIAGNOSIS — I10 ESSENTIAL HYPERTENSION: ICD-10-CM

## 2021-11-01 DIAGNOSIS — D50.8 OTHER IRON DEFICIENCY ANEMIA: ICD-10-CM

## 2021-11-01 LAB
ALBUMIN SERPL BCP-MCNC: 3.4 G/DL (ref 3.5–5)
ALP SERPL-CCNC: 91 U/L (ref 46–116)
ALT SERPL W P-5'-P-CCNC: 32 U/L (ref 12–78)
ANION GAP SERPL CALCULATED.3IONS-SCNC: 10 MMOL/L (ref 4–13)
AST SERPL W P-5'-P-CCNC: 23 U/L (ref 5–45)
BILIRUB SERPL-MCNC: 0.6 MG/DL (ref 0.2–1)
BUN SERPL-MCNC: 18 MG/DL (ref 5–25)
CALCIUM ALBUM COR SERPL-MCNC: 9.5 MG/DL (ref 8.3–10.1)
CALCIUM SERPL-MCNC: 9 MG/DL (ref 8.3–10.1)
CHLORIDE SERPL-SCNC: 105 MMOL/L (ref 100–108)
CO2 SERPL-SCNC: 27 MMOL/L (ref 21–32)
CREAT SERPL-MCNC: 0.87 MG/DL (ref 0.6–1.3)
ERYTHROCYTE [DISTWIDTH] IN BLOOD BY AUTOMATED COUNT: 12.8 % (ref 11.6–15.1)
FERRITIN SERPL-MCNC: 129 NG/ML (ref 8–388)
GFR SERPL CREATININE-BSD FRML MDRD: 62 ML/MIN/1.73SQ M
GLUCOSE P FAST SERPL-MCNC: 93 MG/DL (ref 65–99)
HCT VFR BLD AUTO: 41.3 % (ref 34.8–46.1)
HGB BLD-MCNC: 12.8 G/DL (ref 11.5–15.4)
IRON SATN MFR SERPL: 29 % (ref 15–50)
IRON SERPL-MCNC: 97 UG/DL (ref 50–170)
MCH RBC QN AUTO: 29.6 PG (ref 26.8–34.3)
MCHC RBC AUTO-ENTMCNC: 31 G/DL (ref 31.4–37.4)
MCV RBC AUTO: 96 FL (ref 82–98)
PLATELET # BLD AUTO: 230 THOUSANDS/UL (ref 149–390)
PMV BLD AUTO: 10.5 FL (ref 8.9–12.7)
POTASSIUM SERPL-SCNC: 4.2 MMOL/L (ref 3.5–5.3)
PROT SERPL-MCNC: 6.9 G/DL (ref 6.4–8.2)
RBC # BLD AUTO: 4.32 MILLION/UL (ref 3.81–5.12)
SODIUM SERPL-SCNC: 142 MMOL/L (ref 136–145)
TIBC SERPL-MCNC: 335 UG/DL (ref 250–450)
WBC # BLD AUTO: 5.01 THOUSAND/UL (ref 4.31–10.16)

## 2021-11-01 PROCEDURE — 80053 COMPREHEN METABOLIC PANEL: CPT

## 2021-11-01 PROCEDURE — 82728 ASSAY OF FERRITIN: CPT

## 2021-11-01 PROCEDURE — 85027 COMPLETE CBC AUTOMATED: CPT

## 2021-11-01 PROCEDURE — 83540 ASSAY OF IRON: CPT

## 2021-11-01 PROCEDURE — 83550 IRON BINDING TEST: CPT

## 2021-11-01 PROCEDURE — 36415 COLL VENOUS BLD VENIPUNCTURE: CPT

## 2021-11-01 RX ORDER — METOPROLOL SUCCINATE 25 MG/1
25 TABLET, EXTENDED RELEASE ORAL DAILY
Qty: 90 TABLET | Refills: 3 | Status: SHIPPED | OUTPATIENT
Start: 2021-11-01 | End: 2021-11-04 | Stop reason: SDUPTHER

## 2021-11-04 DIAGNOSIS — I10 ESSENTIAL HYPERTENSION: ICD-10-CM

## 2021-11-04 RX ORDER — METOPROLOL SUCCINATE 25 MG/1
25 TABLET, EXTENDED RELEASE ORAL DAILY
Qty: 90 TABLET | Refills: 3 | Status: SHIPPED | OUTPATIENT
Start: 2021-11-04 | End: 2021-11-08 | Stop reason: SDUPTHER

## 2021-11-08 DIAGNOSIS — I10 ESSENTIAL HYPERTENSION: ICD-10-CM

## 2021-11-08 RX ORDER — METOPROLOL SUCCINATE 25 MG/1
25 TABLET, EXTENDED RELEASE ORAL DAILY
Qty: 90 TABLET | Refills: 3 | Status: SHIPPED | OUTPATIENT
Start: 2021-11-08 | End: 2022-03-29

## 2021-11-22 ENCOUNTER — CONSULT (OUTPATIENT)
Dept: GASTROENTEROLOGY | Facility: CLINIC | Age: 81
End: 2021-11-22
Payer: MEDICARE

## 2021-11-22 VITALS
DIASTOLIC BLOOD PRESSURE: 96 MMHG | HEIGHT: 57 IN | SYSTOLIC BLOOD PRESSURE: 134 MMHG | BODY MASS INDEX: 47.21 KG/M2 | WEIGHT: 218.8 LBS | HEART RATE: 60 BPM

## 2021-11-22 DIAGNOSIS — K21.9 GASTROESOPHAGEAL REFLUX DISEASE WITHOUT ESOPHAGITIS: Primary | ICD-10-CM

## 2021-11-22 DIAGNOSIS — D50.0 IRON DEFICIENCY ANEMIA DUE TO CHRONIC BLOOD LOSS: ICD-10-CM

## 2021-11-22 DIAGNOSIS — R19.5 CHANGE IN CONSISTENCY OF STOOL: ICD-10-CM

## 2021-11-22 PROCEDURE — 99214 OFFICE O/P EST MOD 30 MIN: CPT | Performed by: INTERNAL MEDICINE

## 2021-11-23 ENCOUNTER — OFFICE VISIT (OUTPATIENT)
Dept: FAMILY MEDICINE CLINIC | Facility: HOSPITAL | Age: 81
End: 2021-11-23
Payer: MEDICARE

## 2021-11-23 VITALS
HEIGHT: 57 IN | SYSTOLIC BLOOD PRESSURE: 132 MMHG | RESPIRATION RATE: 16 BRPM | HEART RATE: 64 BPM | DIASTOLIC BLOOD PRESSURE: 84 MMHG | OXYGEN SATURATION: 96 % | WEIGHT: 214 LBS | BODY MASS INDEX: 46.17 KG/M2

## 2021-11-23 DIAGNOSIS — D50.0 IRON DEFICIENCY ANEMIA DUE TO CHRONIC BLOOD LOSS: ICD-10-CM

## 2021-11-23 DIAGNOSIS — D50.8 OTHER IRON DEFICIENCY ANEMIA: ICD-10-CM

## 2021-11-23 DIAGNOSIS — I10 ESSENTIAL HYPERTENSION: Primary | ICD-10-CM

## 2021-11-23 PROCEDURE — 99213 OFFICE O/P EST LOW 20 MIN: CPT | Performed by: INTERNAL MEDICINE

## 2021-11-23 RX ORDER — FERROUS SULFATE TAB EC 324 MG (65 MG FE EQUIVALENT) 324 (65 FE) MG
324 TABLET DELAYED RESPONSE ORAL
Qty: 90 TABLET | Refills: 3 | Status: SHIPPED | OUTPATIENT
Start: 2021-11-23

## 2021-11-23 RX ORDER — VALSARTAN 320 MG/1
320 TABLET ORAL DAILY
Qty: 90 TABLET | Refills: 3 | Status: SHIPPED | OUTPATIENT
Start: 2021-11-23 | End: 2022-03-29

## 2022-01-12 ENCOUNTER — TELEPHONE (OUTPATIENT)
Dept: FAMILY MEDICINE CLINIC | Facility: HOSPITAL | Age: 82
End: 2022-01-12

## 2022-01-12 DIAGNOSIS — J02.9 SORE THROAT: ICD-10-CM

## 2022-01-12 DIAGNOSIS — R19.7 DIARRHEA, UNSPECIFIED TYPE: Primary | ICD-10-CM

## 2022-01-12 DIAGNOSIS — R11.0 NAUSEA: ICD-10-CM

## 2022-01-12 DIAGNOSIS — J02.9 SORE THROAT: Primary | ICD-10-CM

## 2022-01-12 DIAGNOSIS — R50.9 FEVER, UNSPECIFIED FEVER CAUSE: ICD-10-CM

## 2022-01-12 PROCEDURE — U0003 INFECTIOUS AGENT DETECTION BY NUCLEIC ACID (DNA OR RNA); SEVERE ACUTE RESPIRATORY SYNDROME CORONAVIRUS 2 (SARS-COV-2) (CORONAVIRUS DISEASE [COVID-19]), AMPLIFIED PROBE TECHNIQUE, MAKING USE OF HIGH THROUGHPUT TECHNOLOGIES AS DESCRIBED BY CMS-2020-01-R: HCPCS | Performed by: STUDENT IN AN ORGANIZED HEALTH CARE EDUCATION/TRAINING PROGRAM

## 2022-01-12 PROCEDURE — U0005 INFEC AGEN DETEC AMPLI PROBE: HCPCS | Performed by: STUDENT IN AN ORGANIZED HEALTH CARE EDUCATION/TRAINING PROGRAM

## 2022-01-12 RX ORDER — BENZONATATE 100 MG/1
100 CAPSULE ORAL 3 TIMES DAILY PRN
Qty: 30 CAPSULE | Refills: 0 | Status: SHIPPED | OUTPATIENT
Start: 2022-01-12 | End: 2022-03-29 | Stop reason: ALTCHOICE

## 2022-01-12 NOTE — TELEPHONE ENCOUNTER
Spoke to pt  Having ST--which really burns, occasional chills and runny nose  Onset last night? Has not really been around others, is very careful  Fully vacc'd and boosted  I explained the vaccine status to pt  Pls address    Thx adeline

## 2022-01-12 NOTE — TELEPHONE ENCOUNTER
Pt woke up with a sore throat  Started last night  Cant swallow without pain  No other symptoms at this time  Is vaccinated  Uses [facility]   Please advise

## 2022-01-12 NOTE — TELEPHONE ENCOUNTER
Mess to pt  Covid  order put in system  She will go to care now qt--tonight  Phone # given  We are short staffed for swabbing today  Pt is taking c,d, and zinc   (elderberry)  this afternoon she started with diarrhea and nausea  Taking otc tylenol and imodium  Sister to  the rx at [facility] for pt  Told pt it might be days until she gets result    dk

## 2022-02-24 DIAGNOSIS — F41.9 ANXIETY: ICD-10-CM

## 2022-02-24 DIAGNOSIS — K21.9 GASTROESOPHAGEAL REFLUX DISEASE WITHOUT ESOPHAGITIS: ICD-10-CM

## 2022-02-24 RX ORDER — OMEPRAZOLE 20 MG/1
20 CAPSULE, DELAYED RELEASE ORAL
Qty: 90 CAPSULE | Refills: 3 | Status: SHIPPED | OUTPATIENT
Start: 2022-02-24 | End: 2022-02-28

## 2022-02-26 RX ORDER — LORAZEPAM 1 MG/1
TABLET ORAL
Qty: 90 TABLET | Refills: 1 | Status: SHIPPED | OUTPATIENT
Start: 2022-02-26 | End: 2022-05-24 | Stop reason: SDUPTHER

## 2022-02-27 DIAGNOSIS — K21.9 GASTROESOPHAGEAL REFLUX DISEASE WITHOUT ESOPHAGITIS: ICD-10-CM

## 2022-02-28 RX ORDER — OMEPRAZOLE 20 MG/1
CAPSULE, DELAYED RELEASE ORAL
Qty: 90 CAPSULE | Refills: 3 | Status: SHIPPED | OUTPATIENT
Start: 2022-02-28

## 2022-03-29 ENCOUNTER — OFFICE VISIT (OUTPATIENT)
Dept: FAMILY MEDICINE CLINIC | Facility: HOSPITAL | Age: 82
End: 2022-03-29
Payer: MEDICARE

## 2022-03-29 VITALS
SYSTOLIC BLOOD PRESSURE: 122 MMHG | WEIGHT: 209 LBS | RESPIRATION RATE: 14 BRPM | BODY MASS INDEX: 45.09 KG/M2 | HEIGHT: 57 IN | OXYGEN SATURATION: 99 % | HEART RATE: 68 BPM | DIASTOLIC BLOOD PRESSURE: 68 MMHG

## 2022-03-29 DIAGNOSIS — K21.9 GASTROESOPHAGEAL REFLUX DISEASE WITHOUT ESOPHAGITIS: ICD-10-CM

## 2022-03-29 DIAGNOSIS — I71.2 ASCENDING AORTIC ANEURYSM (HCC): ICD-10-CM

## 2022-03-29 DIAGNOSIS — I10 ESSENTIAL HYPERTENSION: Primary | ICD-10-CM

## 2022-03-29 DIAGNOSIS — J30.1 SEASONAL ALLERGIC RHINITIS DUE TO POLLEN: ICD-10-CM

## 2022-03-29 DIAGNOSIS — D50.0 IRON DEFICIENCY ANEMIA DUE TO CHRONIC BLOOD LOSS: ICD-10-CM

## 2022-03-29 DIAGNOSIS — E66.01 MORBID OBESITY WITH BMI OF 45.0-49.9, ADULT (HCC): ICD-10-CM

## 2022-03-29 PROCEDURE — 99214 OFFICE O/P EST MOD 30 MIN: CPT | Performed by: INTERNAL MEDICINE

## 2022-03-29 RX ORDER — MOMETASONE FUROATE 50 UG/1
2 SPRAY, METERED NASAL DAILY
Qty: 17 G | Refills: 5 | Status: SHIPPED | OUTPATIENT
Start: 2022-03-29

## 2022-03-29 RX ORDER — MOMETASONE FUROATE 50 UG/1
2 SPRAY, METERED NASAL DAILY
Qty: 17 G | Refills: 5 | Status: SHIPPED | OUTPATIENT
Start: 2022-03-29 | End: 2022-03-29 | Stop reason: SDUPTHER

## 2022-03-29 NOTE — ASSESSMENT & PLAN NOTE
We reviewed echo reports from August last year  Aortic root was 4 0 cm  She has very mild dilatation  Denies any chest pain or shortness of breath    Continue Toprol atorvastatin

## 2022-03-29 NOTE — ASSESSMENT & PLAN NOTE
Patient has GERD  Her GERD is controlled with diet  She denies dysphagia, heartburn, nausea, signs of GI bleeding  She only takes omeprazole when needed  She had COVID in January and she took vitamin-C    She took omeprazole every day when she was on vitamin-C

## 2022-03-29 NOTE — ASSESSMENT & PLAN NOTE
Patient presents for follow-up appointment  She has chronic hypertension  Her blood pressure is well controlled  Continue Toprol 25 mg daily    I will recheck her electrolytes, renal function, TSH and blood counts before next visit

## 2022-03-29 NOTE — ASSESSMENT & PLAN NOTE
BMI is 45 23  Patient morbid obesity  She lost 9 lb since she had COVID due to decreased p o  Intake when she had the infection  She continues to consume a healthy diet since then

## 2022-03-29 NOTE — ASSESSMENT & PLAN NOTE
Patient's history of iron deficiency anemia  Her iron levels and hemoglobin was normal in November last year  Denies signs of GI bleeding  I will recheck her iron studies and CBC in 3 months    She continues taking iron sulfate once a day

## 2022-03-29 NOTE — ASSESSMENT & PLAN NOTE
Patient has seasonal allergies and she uses Nasonex nasal spray    Her symptoms are well controlled with Nasonex that I refilled today

## 2022-03-29 NOTE — PROGRESS NOTES
Assessment/Plan:    Essential hypertension  Patient presents for follow-up appointment  She has chronic hypertension  Her blood pressure is well controlled  Continue Toprol 25 mg daily  I will recheck her electrolytes, renal function, TSH and blood counts before next visit    Gastroesophageal reflux disease without esophagitis  Patient has GERD  Her GERD is controlled with diet  She denies dysphagia, heartburn, nausea, signs of GI bleeding  She only takes omeprazole when needed  She had COVID in January and she took vitamin-C  She took omeprazole every day when she was on vitamin-C  Seasonal allergic rhinitis due to pollen  Patient has seasonal allergies and she uses Nasonex nasal spray  Her symptoms are well controlled with Nasonex that I refilled today    Ascending aortic aneurysm [facility])  We reviewed echo reports from August last year  Aortic root was 4 0 cm  She has very mild dilatation  Denies any chest pain or shortness of breath  Continue Toprol atorvastatin      Iron deficiency anemia due to chronic blood loss  Patient's history of iron deficiency anemia  Her iron levels and hemoglobin was normal in November last year  Denies signs of GI bleeding  I will recheck her iron studies and CBC in 3 months  She continues taking iron sulfate once a day    Morbid obesity with BMI of 45 0-49 9, adult ([facility])  BMI is 45 23  Patient morbid obesity  She lost 9 lb since she had COVID due to decreased p o  Intake when she had the infection  She continues to consume a healthy diet since then  Diagnoses and all orders for this visit:    Essential hypertension  -     Comprehensive metabolic panel; Future  -     CBC; Future  -     TSH, 3rd generation with Free T4 reflex; Future  -     Lipid panel;  Future    Gastroesophageal reflux disease without esophagitis    Seasonal allergic rhinitis due to pollen  -     mometasone (NASONEX) 50 mcg/act nasal spray; 2 sprays into each nostril daily    Morbid obesity with BMI of 45 0-49 9, adult (HCC)    Ascending aortic aneurysm (HCC)    Iron deficiency anemia due to chronic blood loss  -     CBC; Future  -     Iron Saturation %; Future          Subjective:      Patient ID: Teresa Laurent is a 78 y o  female  HPI    Patient presents for follow-up of chronic conditions as detailed in the assessment and plan  The following portions of the patient's history were reviewed and updated as appropriate: current medications, past family history, past medical history, past social history, past surgical history and problem list     Review of Systems      Objective:    /68   Pulse 68   Resp 14   Ht 4' 9" (1 448 m)   Wt 94 8 kg (209 lb)   LMP  (LMP Unknown)   SpO2 99%   BMI 45 23 kg/m²      Physical Exam  HENT:      Head: Normocephalic  Mouth/Throat:      Mouth: Mucous membranes are moist       Pharynx: No oropharyngeal exudate  Eyes:      Conjunctiva/sclera: Conjunctivae normal    Cardiovascular:      Rate and Rhythm: Normal rate and regular rhythm  Heart sounds: No murmur heard  Pulmonary:      Effort: No respiratory distress  Breath sounds: No wheezing or rales  Abdominal:      General: Bowel sounds are normal       Palpations: Abdomen is soft  Tenderness: There is no abdominal tenderness  Musculoskeletal:      Cervical back: Neck supple  Skin:     General: Skin is warm and dry  Neurological:      Mental Status: She is alert and oriented to person, place, and time  Cranial Nerves: No cranial nerve deficit  Motor: No weakness        Gait: Gait normal    Psychiatric:         Mood and Affect: Mood normal               Miriam Miranda MD

## 2022-04-15 DIAGNOSIS — E78.49 OTHER HYPERLIPIDEMIA: ICD-10-CM

## 2022-04-15 RX ORDER — ATORVASTATIN CALCIUM 10 MG/1
TABLET, FILM COATED ORAL
Qty: 90 TABLET | Refills: 3 | Status: SHIPPED | OUTPATIENT
Start: 2022-04-15

## 2022-05-24 DIAGNOSIS — F41.9 ANXIETY: ICD-10-CM

## 2022-05-24 RX ORDER — LORAZEPAM 1 MG/1
TABLET ORAL
Qty: 90 TABLET | Refills: 1 | Status: SHIPPED | OUTPATIENT
Start: 2022-05-24

## 2022-08-09 ENCOUNTER — RA CDI HCC (OUTPATIENT)
Dept: OTHER | Facility: HOSPITAL | Age: 82
End: 2022-08-09

## 2022-08-09 NOTE — PROGRESS NOTES
[address]  coding opportunities       Chart reviewed, no opportunity found: CHART REVIEWED, NO OPPORTUNITY FOUND        Patients Insurance     Medicare Insurance: Medicare

## 2022-08-22 ENCOUNTER — LAB (OUTPATIENT)
Dept: LAB | Facility: HOSPITAL | Age: 82
End: 2022-08-22
Attending: INTERNAL MEDICINE
Payer: MEDICARE

## 2022-08-22 DIAGNOSIS — I10 ESSENTIAL HYPERTENSION: ICD-10-CM

## 2022-08-22 DIAGNOSIS — D50.0 IRON DEFICIENCY ANEMIA DUE TO CHRONIC BLOOD LOSS: ICD-10-CM

## 2022-08-22 LAB
ALBUMIN SERPL BCP-MCNC: 3.2 G/DL (ref 3.5–5)
ALP SERPL-CCNC: 69 U/L (ref 46–116)
ALT SERPL W P-5'-P-CCNC: 30 U/L (ref 12–78)
ANION GAP SERPL CALCULATED.3IONS-SCNC: 5 MMOL/L (ref 4–13)
AST SERPL W P-5'-P-CCNC: 24 U/L (ref 5–45)
BILIRUB SERPL-MCNC: 0.5 MG/DL (ref 0.2–1)
BUN SERPL-MCNC: 26 MG/DL (ref 5–25)
CALCIUM ALBUM COR SERPL-MCNC: 9.4 MG/DL (ref 8.3–10.1)
CALCIUM SERPL-MCNC: 8.8 MG/DL (ref 8.3–10.1)
CHLORIDE SERPL-SCNC: 106 MMOL/L (ref 96–108)
CHOLEST SERPL-MCNC: 167 MG/DL
CO2 SERPL-SCNC: 31 MMOL/L (ref 21–32)
CREAT SERPL-MCNC: 0.98 MG/DL (ref 0.6–1.3)
ERYTHROCYTE [DISTWIDTH] IN BLOOD BY AUTOMATED COUNT: 12.7 % (ref 11.6–15.1)
GFR SERPL CREATININE-BSD FRML MDRD: 53 ML/MIN/1.73SQ M
GLUCOSE P FAST SERPL-MCNC: 85 MG/DL (ref 65–99)
HCT VFR BLD AUTO: 40.3 % (ref 34.8–46.1)
HDLC SERPL-MCNC: 74 MG/DL
HGB BLD-MCNC: 12.3 G/DL (ref 11.5–15.4)
IRON SATN MFR SERPL: 38 % (ref 15–50)
IRON SERPL-MCNC: 100 UG/DL (ref 50–170)
LDLC SERPL CALC-MCNC: 83 MG/DL (ref 0–100)
MCH RBC QN AUTO: 29.7 PG (ref 26.8–34.3)
MCHC RBC AUTO-ENTMCNC: 30.5 G/DL (ref 31.4–37.4)
MCV RBC AUTO: 97 FL (ref 82–98)
NONHDLC SERPL-MCNC: 93 MG/DL
PLATELET # BLD AUTO: 204 THOUSANDS/UL (ref 149–390)
PMV BLD AUTO: 11.1 FL (ref 8.9–12.7)
POTASSIUM SERPL-SCNC: 4.8 MMOL/L (ref 3.5–5.3)
PROT SERPL-MCNC: 6.5 G/DL (ref 6.4–8.4)
RBC # BLD AUTO: 4.14 MILLION/UL (ref 3.81–5.12)
SODIUM SERPL-SCNC: 142 MMOL/L (ref 135–147)
T4 FREE SERPL-MCNC: 0.99 NG/DL (ref 0.76–1.46)
TIBC SERPL-MCNC: 265 UG/DL (ref 250–450)
TRIGL SERPL-MCNC: 48 MG/DL
TSH SERPL DL<=0.05 MIU/L-ACNC: 4.87 UIU/ML (ref 0.45–4.5)
WBC # BLD AUTO: 4.1 THOUSAND/UL (ref 4.31–10.16)

## 2022-08-22 PROCEDURE — 84439 ASSAY OF FREE THYROXINE: CPT

## 2022-08-22 PROCEDURE — 83540 ASSAY OF IRON: CPT

## 2022-08-22 PROCEDURE — 80053 COMPREHEN METABOLIC PANEL: CPT

## 2022-08-22 PROCEDURE — 85027 COMPLETE CBC AUTOMATED: CPT

## 2022-08-22 PROCEDURE — 84443 ASSAY THYROID STIM HORMONE: CPT

## 2022-08-22 PROCEDURE — 83550 IRON BINDING TEST: CPT

## 2022-08-22 PROCEDURE — 80061 LIPID PANEL: CPT

## 2022-08-22 PROCEDURE — 36415 COLL VENOUS BLD VENIPUNCTURE: CPT

## 2022-08-29 ENCOUNTER — OFFICE VISIT (OUTPATIENT)
Dept: FAMILY MEDICINE CLINIC | Facility: HOSPITAL | Age: 82
End: 2022-08-29
Payer: MEDICARE

## 2022-08-29 VITALS
WEIGHT: 217 LBS | BODY MASS INDEX: 46.82 KG/M2 | HEIGHT: 57 IN | OXYGEN SATURATION: 96 % | HEART RATE: 56 BPM | SYSTOLIC BLOOD PRESSURE: 118 MMHG | RESPIRATION RATE: 16 BRPM | DIASTOLIC BLOOD PRESSURE: 78 MMHG

## 2022-08-29 DIAGNOSIS — Z00.00 MEDICARE ANNUAL WELLNESS VISIT, SUBSEQUENT: Primary | ICD-10-CM

## 2022-08-29 PROCEDURE — G0439 PPPS, SUBSEQ VISIT: HCPCS | Performed by: INTERNAL MEDICINE

## 2022-08-29 NOTE — PROGRESS NOTES
Assessment and Plan:     Problem List Items Addressed This Visit    None     Visit Diagnoses     Medicare annual wellness visit, subsequent    -  Primary           Preventive health issues were discussed with patient, and age appropriate screening tests were ordered as noted in patient's After Visit Summary  Personalized health advice and appropriate referrals for health education or preventive services given if needed, as noted in patient's After Visit Summary  History of Present Illness:     Patient presents for a Medicare Wellness Visit    HPI   Patient Care Team:  Cornelius Jamison MD as PCP - MD Debbie Vidal MD Filomena Beam, MD     Review of Systems:     Review of Systems   Constitutional: Negative for fever  HENT: Negative for hearing loss  Eyes: Negative for visual disturbance  Respiratory: Negative for cough and shortness of breath  Cardiovascular: Negative for chest pain  Gastrointestinal: Negative for abdominal pain and blood in stool  Genitourinary: Negative for dysuria, hematuria and vaginal bleeding  Musculoskeletal: Negative for gait problem  Neurological: Negative for headaches  Psychiatric/Behavioral: Negative for dysphoric mood          Problem List:     Patient Active Problem List   Diagnosis    History of endometrial cancer    Iron deficiency anemia due to chronic blood loss    Essential hypertension    Gastroesophageal reflux disease without esophagitis    Other hyperlipidemia    Seasonal allergic rhinitis due to pollen    Ascending aortic aneurysm (HCC)    Hiatal hernia    BRANDEE (generalized anxiety disorder)    Morbid obesity with BMI of 45 0-49 9, adult [facility])      Past Medical and Surgical History:     Past Medical History:   Diagnosis Date    Anxiety     Cataract     CVA (cerebrovascular accident) ([address] )     Depression     GERD (gastroesophageal reflux disease)     Hearing loss on left     Hypertension     Morbid obesity ([facility] [address] )     Osteoarthritis     Retinopathy     TIA (transient ischemic attack)     2001     Past Surgical History:   Procedure Laterality Date    EYE SURGERY Left 10/2016    HYSTERECTOMY      LYMPH NODE BIOPSY      OOPHORECTOMY Bilateral     PELVIC LAPAROSCOPY        Family History:     Family History   Problem Relation Age of Onset    Breast cancer Sister     Breast cancer additional onset Sister     Prostate cancer Brother     Heart failure Mother     Anemia Mother     Alzheimer's disease Father     Substance Abuse Neg Hx         neg fam hx    Mental illness Neg Hx         neg fam hx      Social History:     Social History     Socioeconomic History    Marital status: Single     Spouse name: None    Number of children: None    Years of education: None    Highest education level: None   Occupational History    None   Tobacco Use    Smoking status: Never Smoker    Smokeless tobacco: Never Used   Vaping Use    Vaping Use: Never used   Substance and Sexual Activity    Alcohol use: Never    Drug use: No    Sexual activity: None   Other Topics Concern    None   Social History Narrative    Uses seat belt    Caffeine use in the a m  [employer]    Dental care, regularly     Exercise: yarkwork    Lives by [employer]  Feels safe at home  Social Determinants of Health     Financial Resource Strain: Low Risk     Difficulty of Paying Living Expenses: Not hard at all   Food Insecurity: Not on file   Transportation Needs: No Transportation Needs    Lack of Transportation (Medical): No    Lack of Transportation (Non-Medical):  No   Physical Activity: Not on file   Stress: Not on file   Social Connections: Not on file   Intimate Partner Violence: Not on file   Housing Stability: Not on file      Medications and Allergies:     Current Outpatient Medications   Medication Sig Dispense Refill    acetaminophen (TYLENOL) 325 mg tablet Take 650 mg by mouth every 4 (four) hours as needed for mild pain       atorvastatin (LIPITOR) 10 mg tablet TAKE 1 TABLET BY MOUTH EVERY DAY 90 tablet 3    Calcium Carb-Cholecalciferol (CALCIUM 600 + D PO) Take 1 capsule by mouth daily   Cholecalciferol (VITAMIN D-3) 1000 UNITS CAPS Take 1 tablet by mouth daily   LORazepam (ATIVAN) 1 mg tablet 1 tablet as needed at bedtime 90 tablet 1    metoprolol succinate (TOPROL-XL) 25 mg 24 hr tablet Take 1 tablet (25 mg total) by mouth daily 90 tablet 3    mometasone (NASONEX) 50 mcg/act nasal spray 2 sprays into each nostril daily 17 g 5    multivitamin (THERAGRAN) TABS Take 1 tablet by mouth daily   omeprazole (PriLOSEC) 20 mg delayed release capsule TAKE 1 CAPSULE DAILY BEFORE BREAKFAST (Patient taking differently: 1 daily as needed) 90 capsule 3    valsartan (DIOVAN) 320 MG tablet Take 1 tablet (320 mg total) by mouth daily 90 tablet 3     No current facility-administered medications for this visit  Allergies   Allergen Reactions    Ace Inhibitors      Annotation - [id number]: COUGH    Aspirin GI Intolerance     Category: Adverse Reaction; Annotation - [id number]: GI DISTRESS    Azithromycin Itching     Annotation - [id number]: PERIRECTAL IRRITATION    Influenza Virus Vaccine       Immunizations:     Immunization History   Administered Date(s) Administered    COVID-19 PFIZER VACCINE 0 3 ML IM 04/02/2021, 04/26/2021    TD (adult) Preservative Free 01/01/2004      Health Maintenance: There are no preventive care reminders to display for this patient  Topic Date Due    Pneumococcal Vaccine: 65+ Years (1 - PCV) Never done    COVID-19 Vaccine (3 - Booster for Pfizer series) 09/26/2021    Influenza Vaccine (1) 09/01/2022      Medicare Screening Tests and Risk Assessments:     Amy Ortiz is here for her Subsequent Wellness visit  Health Risk Assessment:   Patient rates overall health as good  Patient feels that their physical health rating is same  Patient is satisfied with their life  Eyesight was rated as same  Hearing was rated as same  Patient feels that their emotional and mental health rating is same  Patients states they are never, rarely angry  Patient states they are sometimes unusually tired/fatigued  Pain experienced in the last 7 days has been some  Patient's pain rating has been 5/10  Patient states that she has experienced no weight loss or gain in last 6 months  Depression Screening:   PHQ-2 Score: 0      Fall Risk Screening: In the past year, patient has experienced: no history of falling in past year      Urinary Incontinence Screening:   Patient has not leaked urine accidently in the last six months  Home Safety:  Patient does not have trouble with stairs inside or outside of their home  Patient has working smoke alarms Home safety hazards include: none  Nutrition:   Current diet is Regular  Medications:   Patient is currently taking over-the-counter supplements  OTC medications include: see medication list  Patient is able to manage medications  Activities of Daily Living (ADLs)/Instrumental Activities of Daily Living (IADLs):   Walk and transfer into and out of bed and chair?: Yes  Dress and groom yourself?: Yes    Bathe or shower yourself?: Yes    Feed yourself?  Yes  Do your laundry/housekeeping?: Yes  Manage your money, pay your bills and track your expenses?: Yes  Make your own meals?: Yes    Do your own shopping?: Yes    Previous Hospitalizations:   Any hospitalizations or ED visits within the last 12 months?: No      Advance Care Planning:   Living will: Yes    Advanced directive: Yes    Advanced directive counseling given: Yes    End of Life Decisions reviewed with patient: Yes      Cognitive Screening:   Provider or family/friend/caregiver concerned regarding cognition?: No    PREVENTIVE SCREENINGS      Cardiovascular Screening:    General: Screening Not Indicated, History Lipid Disorder, Risks and Benefits Discussed and Screening Current Diabetes Screening:     General: Screening Current and Risks and Benefits Discussed      Colorectal Cancer Screening:     General: Risks and Benefits Discussed and Screening Not Indicated      Cervical Cancer Screening:    General: Screening Not Indicated      Lung Cancer Screening:     General: Screening Not Indicated    Screening, Brief Intervention, and Referral to Treatment (SBIRT)    Screening  Typical number of drinks in a day: 0  Typical number of drinks in a week: 0  Interpretation: Low risk drinking behavior  Single Item Drug Screening:  How often have you used an illegal drug (including marijuana) or a prescription medication for non-medical reasons in the past year? never    Single Item Drug Screen Score: 0  Interpretation: Negative screen for possible drug use disorder    Brief Intervention  Alcohol & drug use screenings were reviewed  No concerns regarding substance use disorder identified  No exam data present     Physical Exam:     /78   Pulse 56   Resp 16   Ht 4' 9" (1 448 m)   Wt 98 4 kg (217 lb)   LMP  (LMP Unknown)   SpO2 96%   BMI 46 96 kg/m²     Physical Exam  Constitutional:       General: She is not in acute distress  HENT:      Head: Normocephalic  Mouth/Throat:      Mouth: Mucous membranes are moist       Pharynx: No oropharyngeal exudate  Eyes:      Conjunctiva/sclera: Conjunctivae normal    Cardiovascular:      Rate and Rhythm: Normal rate and regular rhythm  Heart sounds: No murmur heard  Pulmonary:      Effort: No respiratory distress  Breath sounds: No wheezing or rales  Abdominal:      General: Bowel sounds are normal       Palpations: Abdomen is soft  Tenderness: There is no abdominal tenderness  Skin:     General: Skin is warm and dry  Neurological:      Mental Status: She is alert and oriented to person, place, and time  Cranial Nerves: No cranial nerve deficit  Motor: No weakness        Gait: Gait normal    Psychiatric: Mood and Affect: Mood normal          Thought Content:  Thought content normal           Tiana Stover MD

## 2022-08-29 NOTE — PATIENT INSTRUCTIONS
Medicare Preventive Visit Patient Instructions  Thank you for completing your Welcome to Medicare Visit or Medicare Annual Wellness Visit today  Your next wellness visit will be due in one year (8/30/2023)  The screening/preventive services that you may require over the next 5-10 years are detailed below  Some tests may not apply to you based off risk factors and/or age  Screening tests ordered at today's visit but not completed yet may show as past due  Also, please note that scanned in results may not display below  Preventive Screenings:  Service Recommendations Previous Testing/Comments   Colorectal Cancer Screening  * Colonoscopy    * Fecal Occult Blood Test (FOBT)/Fecal Immunochemical Test (FIT)  * Fecal DNA/Cologuard Test  * Flexible Sigmoidoscopy Age: 39-70 years old   Colonoscopy: every 10 years (may be performed more frequently if at higher risk)  OR  FOBT/FIT: every 1 year  OR  Cologuard: every 3 years  OR  Sigmoidoscopy: every 5 years  Screening may be recommended earlier than age 39 if at higher risk for colorectal cancer  Also, an individualized decision between you and your healthcare provider will decide whether screening between the ages of 74-80 would be appropriate  Colonoscopy: 07/16/2018  FOBT/FIT: Not on file  Cologuard: Not on file  Sigmoidoscopy: Not on file          Breast Cancer Screening Age: 36 years old  Frequency: every 1-2 years  Not required if history of left and right mastectomy Mammogram: Not on file        Cervical Cancer Screening Between the ages of 21-29, pap smear recommended once every 3 years  Between the ages of 33-67, can perform pap smear with HPV co-testing every 5 years     Recommendations may differ for women with a history of total hysterectomy, cervical cancer, or abnormal pap smears in past  Pap Smear: Not on file    Screening Not Indicated   Hepatitis C Screening Once for adults born between [facility]  More frequently in patients at high risk for Hepatitis C Hep C Antibody: Not on file        Diabetes Screening 1-2 times per year if you're at risk for diabetes or have pre-diabetes Fasting glucose: 85 mg/dL (8/22/2022)  A1C: 5 2 % (2/8/2018)  Screening Current   Cholesterol Screening Once every 5 years if you don't have a lipid disorder  May order more often based on risk factors  Lipid panel: 08/22/2022    Screening Not Indicated  History Lipid Disorder     Other Preventive Screenings Covered by Medicare:  1  Abdominal Aortic Aneurysm (AAA) Screening: covered once if your at risk  You're considered to be at risk if you have a family history of AAA  2  Lung Cancer Screening: covers low dose CT scan once per year if you meet all of the following conditions: (1) Age 50-69; (2) No signs or symptoms of lung cancer; (3) Current smoker or have quit smoking within the last 15 years; (4) You have a tobacco smoking history of at least 20 pack years (packs per day multiplied by number of years you smoked); (5) You get a written order from a healthcare provider  3  Glaucoma Screening: covered annually if you're considered high risk: (1) You have diabetes OR (2) Family history of glaucoma OR (3)  aged 48 and older OR (3)  American aged 72 and older  3  Osteoporosis Screening: covered every 2 years if you meet one of the following conditions: (1) You're estrogen deficient and at risk for osteoporosis based off medical history and other findings; (2) Have a vertebral abnormality; (3) On glucocorticoid therapy for more than 3 months; (4) Have primary hyperparathyroidism; (5) On osteoporosis medications and need to assess response to drug therapy  · Last bone density test (DXA Scan): 07/22/2019   5  HIV Screening: covered annually if you're between the age of 15-65  Also covered annually if you are younger than 13 and older than 72 with risk factors for HIV infection   For pregnant patients, it is covered up to 3 times per pregnancy  Immunizations:  Immunization Recommendations   Influenza Vaccine Annual influenza vaccination during flu season is recommended for all persons aged >= 6 months who do not have contraindications   Pneumococcal Vaccine   * Pneumococcal conjugate vaccine = PCV13 (Prevnar 13), PCV15 (Vaxneuvance), PCV20 (Prevnar 20)  * Pneumococcal polysaccharide vaccine = PPSV23 (Pneumovax) Adults 25-60 years old: 1-3 doses may be recommended based on certain risk factors  Adults 72 years old: 1-2 doses may be recommended based off what pneumonia vaccine you previously received   Hepatitis B Vaccine 3 dose series if at intermediate or high risk (ex: diabetes, end stage renal disease, liver disease)   Tetanus (Td) Vaccine - COST NOT COVERED BY MEDICARE PART B Following completion of primary series, a booster dose should be given every 10 years to maintain immunity against tetanus  Td may also be given as tetanus wound prophylaxis  Tdap Vaccine - COST NOT COVERED BY MEDICARE PART B Recommended at least once for all adults  For pregnant patients, recommended with each pregnancy  Shingles Vaccine (Shingrix) - COST NOT COVERED BY MEDICARE PART B  2 shot series recommended in those aged 48 and above     Health Maintenance Due:  There are no preventive care reminders to display for this patient  Immunizations Due:      Topic Date Due    Pneumococcal Vaccine: 65+ Years (1 - PCV) Never done    COVID-19 Vaccine (3 - Booster for Pfizer series) 09/26/2021    Influenza Vaccine (1) 09/01/2022     Advance Directives   What are advance directives? Advance directives are legal documents that state your wishes and plans for medical care  These plans are made ahead of time in case you lose your ability to make decisions for yourself  Advance directives can apply to any medical decision, such as the treatments you want, and if you want to donate organs  What are the types of advance directives?   There are many types of advance directives, and each state has rules about how to use them  You may choose a combination of any of the following:  · Living will: This is a written record of the treatment you want  You can also choose which treatments you do not want, which to limit, and which to stop at a certain time  This includes surgery, medicine, IV fluid, and tube feedings  · Durable power of  for healthcare [place] [facility]): This is a written record that states who you want to make healthcare choices for you when you are unable to make them for yourself  This person, called a proxy, is usually a family member or a friend  You may choose more than 1 proxy  · Do not resuscitate (DNR) order:  A DNR order is used in case your heart stops beating or you stop breathing  It is a request not to have certain forms of treatment, such as CPR  A DNR order may be included in other types of advance directives  · Medical directive: This covers the care that you want if you are in a coma, near death, or unable to make decisions for yourself  You can list the treatments you want for each condition  Treatment may include pain medicine, surgery, blood transfusions, dialysis, IV or tube feedings, and a ventilator (breathing machine)  · Values history: This document has questions about your views, beliefs, and how you feel and think about life  This information can help others choose the care that you would choose  Why are advance directives important? An advance directive helps you control your care  Although spoken wishes may be used, it is better to have your wishes written down  Spoken wishes can be misunderstood, or not followed  Treatments may be given even if you do not want them  An advance directive may make it easier for your family to make difficult choices about your care     Weight Management   Why it is important to manage your weight:  Being overweight increases your risk of health conditions such as heart disease, high blood pressure, type 2 diabetes, and certain types of cancer  It can also increase your risk for osteoarthritis, sleep apnea, and other respiratory problems  Aim for a slow, steady weight loss  Even a small amount of weight loss can lower your risk of health problems  How to lose weight safely:  A safe and healthy way to lose weight is to eat fewer calories and get regular exercise  You can lose up about 1 pound a week by decreasing the number of calories you eat by 500 calories each day  Healthy meal plan for weight management:  A healthy meal plan includes a variety of foods, contains fewer calories, and helps you stay healthy  A healthy meal plan includes the following:  · Eat whole-grain foods more often  A healthy meal plan should contain fiber  Fiber is the part of grains, fruits, and vegetables that is not broken down by your body  Whole-grain foods are healthy and provide extra fiber in your diet  Some examples of whole-grain foods are whole-wheat breads and pastas, oatmeal, brown rice, and bulgur  · Eat a variety of vegetables every day  Include dark, leafy greens such as spinach, kale, mary greens, and mustard greens  Eat yellow and orange vegetables such as carrots, sweet potatoes, and winter squash  · Eat a variety of fruits every day  Choose fresh or canned fruit (canned in its own juice or light syrup) instead of juice  Fruit juice has very little or no fiber  · Eat low-fat dairy foods  Drink fat-free (skim) milk or 1% milk  Eat fat-free yogurt and low-fat cottage cheese  Try low-fat cheeses such as mozzarella and other reduced-fat cheeses  · Choose meat and other protein foods that are low in fat  Choose beans or other legumes such as split peas or lentils  Choose fish, skinless poultry (chicken or turkey), or lean cuts of red meat (beef or pork)  Before you cook meat or poultry, cut off any visible fat  · Use less fat and oil  Try baking foods instead of frying them   Add less fat, such as margarine, sour cream, regular salad dressing and mayonnaise to foods  Eat fewer high-fat foods  Some examples of high-fat foods include french fries, doughnuts, ice cream, and cakes  · Eat fewer sweets  Limit foods and drinks that are high in sugar  This includes candy, cookies, regular soda, and sweetened drinks  Exercise:  Exercise at least 30 minutes per day on most days of the week  Some examples of exercise include walking, biking, dancing, and swimming  You can also fit in more physical activity by taking the stairs instead of the elevator or parking farther away from stores  Ask your healthcare provider about the best exercise plan for you  © Copyright [employer] 2018 Information is for End User's use only and may not be sold, redistributed or otherwise used for commercial purposes  All illustrations and images included in CareNotes® are the copyrighted property of [employer]  or [facility] Preventive Visit Patient Instructions  Thank you for completing your Welcome to Medicare Visit or Medicare Annual Wellness Visit today  Your next wellness visit will be due in one year (8/30/2023)  The screening/preventive services that you may require over the next 5-10 years are detailed below  Some tests may not apply to you based off risk factors and/or age  Screening tests ordered at today's visit but not completed yet may show as past due  Also, please note that scanned in results may not display below    Preventive Screenings:  Service Recommendations Previous Testing/Comments   Colorectal Cancer Screening  * Colonoscopy    * Fecal Occult Blood Test (FOBT)/Fecal Immunochemical Test (FIT)  * Fecal DNA/Cologuard Test  * Flexible Sigmoidoscopy Age: 39-70 years old   Colonoscopy: every 10 years (may be performed more frequently if at higher risk)  OR  FOBT/FIT: every 1 year  OR  Cologuard: every 3 years  OR  Sigmoidoscopy: every 5 years  Screening may be recommended earlier than age 45 if at higher risk for colorectal cancer  Also, an individualized decision between you and your healthcare provider will decide whether screening between the ages of 74-80 would be appropriate  Colonoscopy: 07/16/2018  FOBT/FIT: Not on file  Cologuard: Not on file  Sigmoidoscopy: Not on file          Breast Cancer Screening Age: 36 years old  Frequency: every 1-2 years  Not required if history of left and right mastectomy Mammogram: Not on file        Cervical Cancer Screening Between the ages of 21-29, pap smear recommended once every 3 years  Between the ages of 33-67, can perform pap smear with HPV co-testing every 5 years  Recommendations may differ for women with a history of total hysterectomy, cervical cancer, or abnormal pap smears in past  Pap Smear: Not on file    Screening Not Indicated   Hepatitis C Screening Once for adults born between 1945 and 1965  More frequently in patients at high risk for Hepatitis C Hep C Antibody: Not on file        Diabetes Screening 1-2 times per year if you're at risk for diabetes or have pre-diabetes Fasting glucose: 85 mg/dL (8/22/2022)  A1C: 5 2 % (2/8/2018)  Screening Current   Cholesterol Screening Once every 5 years if you don't have a lipid disorder  May order more often based on risk factors  Lipid panel: 08/22/2022    Screening Not Indicated  History Lipid Disorder     Other Preventive Screenings Covered by Medicare:  6  Abdominal Aortic Aneurysm (AAA) Screening: covered once if your at risk  You're considered to be at risk if you have a family history of AAA    7  Lung Cancer Screening: covers low dose CT scan once per year if you meet all of the following conditions: (1) Age 50-69; (2) No signs or symptoms of lung cancer; (3) Current smoker or have quit smoking within the last 15 years; (4) You have a tobacco smoking history of at least 20 pack years (packs per day multiplied by number of years you smoked); (5) You get a written order from a healthcare provider  8  Glaucoma Screening: covered annually if you're considered high risk: (1) You have diabetes OR (2) Family history of glaucoma OR (3)  aged 48 and older OR (3)  American aged 72 and older  5  Osteoporosis Screening: covered every 2 years if you meet one of the following conditions: (1) You're estrogen deficient and at risk for osteoporosis based off medical history and other findings; (2) Have a vertebral abnormality; (3) On glucocorticoid therapy for more than 3 months; (4) Have primary hyperparathyroidism; (5) On osteoporosis medications and need to assess response to drug therapy  · Last bone density test (DXA Scan): 07/22/2019   10  HIV Screening: covered annually if you're between the age of 15-65  Also covered annually if you are younger than 13 and older than 72 with risk factors for HIV infection  For pregnant patients, it is covered up to 3 times per pregnancy  Immunizations:  Immunization Recommendations   Influenza Vaccine Annual influenza vaccination during flu season is recommended for all persons aged >= 6 months who do not have contraindications   Pneumococcal Vaccine   * Pneumococcal conjugate vaccine = PCV13 (Prevnar 13), PCV15 (Vaxneuvance), PCV20 (Prevnar 20)  * Pneumococcal polysaccharide vaccine = PPSV23 (Pneumovax) Adults 25-60 years old: 1-3 doses may be recommended based on certain risk factors  Adults 72 years old: 1-2 doses may be recommended based off what pneumonia vaccine you previously received   Hepatitis B Vaccine 3 dose series if at intermediate or high risk (ex: diabetes, end stage renal disease, liver disease)   Tetanus (Td) Vaccine - COST NOT COVERED BY MEDICARE PART B Following completion of primary series, a booster dose should be given every 10 years to maintain immunity against tetanus  Td may also be given as tetanus wound prophylaxis  Tdap Vaccine - COST NOT COVERED BY MEDICARE PART B Recommended at least once for all adults   For pregnant patients, recommended with each pregnancy  Shingles Vaccine (Shingrix) - COST NOT COVERED BY MEDICARE PART B  2 shot series recommended in those aged 48 and above     Health Maintenance Due:  There are no preventive care reminders to display for this patient  Immunizations Due:      Topic Date Due    Pneumococcal Vaccine: 65+ Years (1 - PCV) Never done    COVID-19 Vaccine (3 - Booster for Pfizer series) 09/26/2021    Influenza Vaccine (1) 09/01/2022     Advance Directives   What are advance directives? Advance directives are legal documents that state your wishes and plans for medical care  These plans are made ahead of time in case you lose your ability to make decisions for yourself  Advance directives can apply to any medical decision, such as the treatments you want, and if you want to donate organs  What are the types of advance directives? There are many types of advance directives, and each state has rules about how to use them  You may choose a combination of any of the following:  · Living will: This is a written record of the treatment you want  You can also choose which treatments you do not want, which to limit, and which to stop at a certain time  This includes surgery, medicine, IV fluid, and tube feedings  · Durable power of  for healthcare [place] [facility]): This is a written record that states who you want to make healthcare choices for you when you are unable to make them for yourself  This person, called a proxy, is usually a family member or a friend  You may choose more than 1 proxy  · Do not resuscitate (DNR) order:  A DNR order is used in case your heart stops beating or you stop breathing  It is a request not to have certain forms of treatment, such as CPR  A DNR order may be included in other types of advance directives  · Medical directive: This covers the care that you want if you are in a coma, near death, or unable to make decisions for yourself   You can list the treatments you want for each condition  Treatment may include pain medicine, surgery, blood transfusions, dialysis, IV or tube feedings, and a ventilator (breathing machine)  · Values history: This document has questions about your views, beliefs, and how you feel and think about life  This information can help others choose the care that you would choose  Why are advance directives important? An advance directive helps you control your care  Although spoken wishes may be used, it is better to have your wishes written down  Spoken wishes can be misunderstood, or not followed  Treatments may be given even if you do not want them  An advance directive may make it easier for your family to make difficult choices about your care  Weight Management   Why it is important to manage your weight:  Being overweight increases your risk of health conditions such as heart disease, high blood pressure, type 2 diabetes, and certain types of cancer  It can also increase your risk for osteoarthritis, sleep apnea, and other respiratory problems  Aim for a slow, steady weight loss  Even a small amount of weight loss can lower your risk of health problems  How to lose weight safely:  A safe and healthy way to lose weight is to eat fewer calories and get regular exercise  You can lose up about 1 pound a week by decreasing the number of calories you eat by 500 calories each day  Healthy meal plan for weight management:  A healthy meal plan includes a variety of foods, contains fewer calories, and helps you stay healthy  A healthy meal plan includes the following:  · Eat whole-grain foods more often  A healthy meal plan should contain fiber  Fiber is the part of grains, fruits, and vegetables that is not broken down by your body  Whole-grain foods are healthy and provide extra fiber in your diet  Some examples of whole-grain foods are whole-wheat breads and pastas, oatmeal, brown rice, and bulgur    · Eat a variety of vegetables every day  Include dark, leafy greens such as spinach, kale, mary greens, and mustard greens  Eat yellow and orange vegetables such as carrots, sweet potatoes, and winter squash  · Eat a variety of fruits every day  Choose fresh or canned fruit (canned in its own juice or light syrup) instead of juice  Fruit juice has very little or no fiber  · Eat low-fat dairy foods  Drink fat-free (skim) milk or 1% milk  Eat fat-free yogurt and low-fat cottage cheese  Try low-fat cheeses such as mozzarella and other reduced-fat cheeses  · Choose meat and other protein foods that are low in fat  Choose beans or other legumes such as split peas or lentils  Choose fish, skinless poultry (chicken or turkey), or lean cuts of red meat (beef or pork)  Before you cook meat or poultry, cut off any visible fat  · Use less fat and oil  Try baking foods instead of frying them  Add less fat, such as margarine, sour cream, regular salad dressing and mayonnaise to foods  Eat fewer high-fat foods  Some examples of high-fat foods include french fries, doughnuts, ice cream, and cakes  · Eat fewer sweets  Limit foods and drinks that are high in sugar  This includes candy, cookies, regular soda, and sweetened drinks  Exercise:  Exercise at least 30 minutes per day on most days of the week  Some examples of exercise include walking, biking, dancing, and swimming  You can also fit in more physical activity by taking the stairs instead of the elevator or parking farther away from stores  Ask your healthcare provider about the best exercise plan for you  © Copyright [employer] 2018 Information is for End User's use only and may not be sold, redistributed or otherwise used for commercial purposes   All illustrations and images included in CareNotes® are the copyrighted property of [employer]  or [address] [place] [address] [facility]

## 2022-11-17 DIAGNOSIS — F41.9 ANXIETY: ICD-10-CM

## 2022-11-17 DIAGNOSIS — I10 ESSENTIAL HYPERTENSION: ICD-10-CM

## 2022-11-17 RX ORDER — LORAZEPAM 1 MG/1
TABLET ORAL
Qty: 90 TABLET | Refills: 1 | Status: SHIPPED | OUTPATIENT
Start: 2022-11-17

## 2022-11-17 RX ORDER — VALSARTAN 320 MG/1
320 TABLET ORAL DAILY
Qty: 90 TABLET | Refills: 3 | Status: SHIPPED | OUTPATIENT
Start: 2022-11-17 | End: 2023-02-15

## 2023-01-30 DIAGNOSIS — I10 ESSENTIAL HYPERTENSION: ICD-10-CM

## 2023-01-30 RX ORDER — METOPROLOL SUCCINATE 25 MG/1
25 TABLET, EXTENDED RELEASE ORAL DAILY
Qty: 90 TABLET | Refills: 3 | Status: SHIPPED | OUTPATIENT
Start: 2023-01-30 | End: 2023-04-30

## 2023-02-21 ENCOUNTER — RA CDI HCC (OUTPATIENT)
Dept: OTHER | Facility: HOSPITAL | Age: 83
End: 2023-02-21

## 2023-02-28 ENCOUNTER — OFFICE VISIT (OUTPATIENT)
Dept: FAMILY MEDICINE CLINIC | Facility: HOSPITAL | Age: 83
End: 2023-02-28

## 2023-02-28 VITALS
HEART RATE: 72 BPM | RESPIRATION RATE: 16 BRPM | WEIGHT: 220 LBS | HEIGHT: 57 IN | OXYGEN SATURATION: 93 % | BODY MASS INDEX: 47.46 KG/M2 | SYSTOLIC BLOOD PRESSURE: 116 MMHG | DIASTOLIC BLOOD PRESSURE: 82 MMHG

## 2023-02-28 DIAGNOSIS — K21.9 GASTROESOPHAGEAL REFLUX DISEASE WITHOUT ESOPHAGITIS: ICD-10-CM

## 2023-02-28 DIAGNOSIS — I10 ESSENTIAL HYPERTENSION: Primary | ICD-10-CM

## 2023-02-28 DIAGNOSIS — E78.49 OTHER HYPERLIPIDEMIA: ICD-10-CM

## 2023-02-28 DIAGNOSIS — E66.01 MORBID OBESITY WITH BMI OF 45.0-49.9, ADULT (HCC): ICD-10-CM

## 2023-02-28 NOTE — ASSESSMENT & PLAN NOTE
Patient is hyperlipidemia  She is on atorvastatin  Her liver function test was normal in August last year  Cholesterol is also well controlled  I also ordered lipid panel and LFTs before next appointment    Continue atorvastatin

## 2023-02-28 NOTE — ASSESSMENT & PLAN NOTE
BMI is 47 61  Patient will be obesity  We discussed decreasing portion sizes and being more physically active as the weather improves      I will check her TSH

## 2023-02-28 NOTE — ASSESSMENT & PLAN NOTE
Patient has GERD  She denies acid reflux, dysphagia on omeprazole  Denies signs of bleeding    We will check her CBC

## 2023-02-28 NOTE — PROGRESS NOTES
Assessment/Plan:    Essential hypertension  Patient has hypertension  Blood pressure is controlled today  Continue Toprol and losartan  I will check her electrolytes and renal function before next appointment    Other hyperlipidemia  Patient is hyperlipidemia  She is on atorvastatin  Her liver function test was normal in August last year  Cholesterol is also well controlled  I also ordered lipid panel and LFTs before next appointment  Continue atorvastatin    Morbid obesity with BMI of 45 0-49 9, adult (HCC)  BMI is 47 61  Patient will be obesity  We discussed decreasing portion sizes and being more physically active as the weather improves  I will check her TSH    Gastroesophageal reflux disease without esophagitis  Patient has GERD  She denies acid reflux, dysphagia on omeprazole  Denies signs of bleeding  We will check her CBC       Diagnoses and all orders for this visit:    Essential hypertension  -     Comprehensive metabolic panel; Future  -     CBC; Future  -     TSH, 3rd generation with Free T4 reflex; Future    Other hyperlipidemia  -     Lipid panel; Future    Morbid obesity with BMI of 45 0-49 9, adult (HCC)    Gastroesophageal reflux disease without esophagitis          Subjective:      Patient ID: Heath Akins is a 78 y o  female  HPI    Patient presents for follow-up of chronic conditions as detailed in the assessment and plan  The following portions of the patient's history were reviewed and updated as appropriate: current medications, past family history, past medical history, past social history, past surgical history and problem list     Review of Systems   Musculoskeletal: Positive for arthralgias (Patient arthralgias in the knees, shoulders, low back when the weather was cold in the winter)           Objective:    /82   Pulse 72   Resp 16   Ht 4' 9" (1 448 m)   Wt 99 8 kg (220 lb)   LMP  (LMP Unknown)   SpO2 93%   BMI 47 61 kg/m²      Physical Exam  Constitutional:       General: She is not in acute distress  Appearance: She is not toxic-appearing  HENT:      Head: Normocephalic  Eyes:      Conjunctiva/sclera: Conjunctivae normal    Neck:      Comments: I did not feel thyroid nodules  Cardiovascular:      Rate and Rhythm: Normal rate and regular rhythm  Heart sounds: No murmur heard  Pulmonary:      Effort: No respiratory distress  Breath sounds: No wheezing or rales  Abdominal:      General: Bowel sounds are normal  There is no distension  Palpations: Abdomen is soft  There is no mass  Tenderness: There is no abdominal tenderness  Musculoskeletal:      Cervical back: Neck supple  Lymphadenopathy:      Cervical: No cervical adenopathy  Skin:     General: Skin is warm and dry  Comments: Patient has chronic lymphedema of the lower extremities, stable   Neurological:      Mental Status: She is alert and oriented to person, place, and time  Cranial Nerves: No cranial nerve deficit  Psychiatric:         Mood and Affect: Mood normal          Thought Content: Thought content normal            Depression Screening and Follow-up Plan: Patient was screened for depression during today's encounter  They screened negative with a PHQ-2 score of 0          Earnest Ferreira MD

## 2023-02-28 NOTE — ASSESSMENT & PLAN NOTE
Patient has hypertension  Blood pressure is controlled today  Continue Toprol and losartan      I will check her electrolytes and renal function before next appointment

## 2023-05-11 DIAGNOSIS — E78.49 OTHER HYPERLIPIDEMIA: ICD-10-CM

## 2023-05-11 RX ORDER — ATORVASTATIN CALCIUM 10 MG/1
TABLET, FILM COATED ORAL
Qty: 90 TABLET | Refills: 3 | Status: SHIPPED | OUTPATIENT
Start: 2023-05-11 | End: 2023-05-17 | Stop reason: SDUPTHER

## 2023-05-17 DIAGNOSIS — F41.9 ANXIETY: ICD-10-CM

## 2023-05-17 DIAGNOSIS — E78.49 OTHER HYPERLIPIDEMIA: ICD-10-CM

## 2023-05-17 RX ORDER — LORAZEPAM 1 MG/1
TABLET ORAL
Qty: 90 TABLET | Refills: 1 | Status: SHIPPED | OUTPATIENT
Start: 2023-05-17

## 2023-05-17 RX ORDER — ATORVASTATIN CALCIUM 10 MG/1
10 TABLET, FILM COATED ORAL DAILY
Qty: 90 TABLET | Refills: 3 | Status: SHIPPED | OUTPATIENT
Start: 2023-05-17

## 2023-08-28 ENCOUNTER — RA CDI HCC (OUTPATIENT)
Dept: OTHER | Facility: HOSPITAL | Age: 83
End: 2023-08-28

## 2023-08-28 NOTE — PROGRESS NOTES
[address] [facility] coding opportunities       Chart reviewed, no opportunity found: CHART REVIEWED, NO OPPORTUNITY FOUND        Patients Insurance     Medicare Insurance: Medicare

## 2023-08-30 ENCOUNTER — APPOINTMENT (OUTPATIENT)
Dept: LAB | Facility: HOSPITAL | Age: 83
End: 2023-08-30
Payer: MEDICARE

## 2023-08-30 DIAGNOSIS — E78.49 OTHER HYPERLIPIDEMIA: ICD-10-CM

## 2023-08-30 DIAGNOSIS — I10 ESSENTIAL HYPERTENSION: ICD-10-CM

## 2023-08-30 LAB
ALBUMIN SERPL BCP-MCNC: 4.1 G/DL (ref 3.5–5)
ALP SERPL-CCNC: 65 U/L (ref 34–104)
ALT SERPL W P-5'-P-CCNC: 17 U/L (ref 7–52)
ANION GAP SERPL CALCULATED.3IONS-SCNC: 6 MMOL/L
AST SERPL W P-5'-P-CCNC: 24 U/L (ref 13–39)
BILIRUB SERPL-MCNC: 0.65 MG/DL (ref 0.2–1)
BUN SERPL-MCNC: 21 MG/DL (ref 5–25)
CALCIUM SERPL-MCNC: 9.4 MG/DL (ref 8.4–10.2)
CHLORIDE SERPL-SCNC: 105 MMOL/L (ref 96–108)
CHOLEST SERPL-MCNC: 179 MG/DL
CO2 SERPL-SCNC: 29 MMOL/L (ref 21–32)
CREAT SERPL-MCNC: 0.89 MG/DL (ref 0.6–1.3)
ERYTHROCYTE [DISTWIDTH] IN BLOOD BY AUTOMATED COUNT: 13.1 % (ref 11.6–15.1)
GFR SERPL CREATININE-BSD FRML MDRD: 59 ML/MIN/1.73SQ M
GLUCOSE P FAST SERPL-MCNC: 97 MG/DL (ref 65–99)
HCT VFR BLD AUTO: 40.5 % (ref 34.8–46.1)
HDLC SERPL-MCNC: 66 MG/DL
HGB BLD-MCNC: 12.6 G/DL (ref 11.5–15.4)
LDLC SERPL CALC-MCNC: 96 MG/DL (ref 0–100)
MCH RBC QN AUTO: 29.6 PG (ref 26.8–34.3)
MCHC RBC AUTO-ENTMCNC: 31.1 G/DL (ref 31.4–37.4)
MCV RBC AUTO: 95 FL (ref 82–98)
NONHDLC SERPL-MCNC: 113 MG/DL
PLATELET # BLD AUTO: 206 THOUSANDS/UL (ref 149–390)
PMV BLD AUTO: 10.4 FL (ref 8.9–12.7)
POTASSIUM SERPL-SCNC: 4.4 MMOL/L (ref 3.5–5.3)
PROT SERPL-MCNC: 6.5 G/DL (ref 6.4–8.4)
RBC # BLD AUTO: 4.25 MILLION/UL (ref 3.81–5.12)
SODIUM SERPL-SCNC: 140 MMOL/L (ref 135–147)
TRIGL SERPL-MCNC: 87 MG/DL
TSH SERPL DL<=0.05 MIU/L-ACNC: 3.62 UIU/ML (ref 0.45–4.5)
WBC # BLD AUTO: 6.03 THOUSAND/UL (ref 4.31–10.16)

## 2023-08-30 PROCEDURE — 36415 COLL VENOUS BLD VENIPUNCTURE: CPT

## 2023-08-30 PROCEDURE — 85027 COMPLETE CBC AUTOMATED: CPT

## 2023-08-30 PROCEDURE — 80061 LIPID PANEL: CPT

## 2023-08-30 PROCEDURE — 84443 ASSAY THYROID STIM HORMONE: CPT

## 2023-08-30 PROCEDURE — 80053 COMPREHEN METABOLIC PANEL: CPT

## 2023-09-05 ENCOUNTER — OFFICE VISIT (OUTPATIENT)
Dept: FAMILY MEDICINE CLINIC | Facility: HOSPITAL | Age: 83
End: 2023-09-05
Payer: MEDICARE

## 2023-09-05 VITALS
HEIGHT: 58 IN | OXYGEN SATURATION: 94 % | SYSTOLIC BLOOD PRESSURE: 130 MMHG | BODY MASS INDEX: 45.97 KG/M2 | DIASTOLIC BLOOD PRESSURE: 72 MMHG | HEART RATE: 63 BPM | RESPIRATION RATE: 16 BRPM | WEIGHT: 219 LBS

## 2023-09-05 DIAGNOSIS — K21.9 GASTROESOPHAGEAL REFLUX DISEASE WITHOUT ESOPHAGITIS: ICD-10-CM

## 2023-09-05 DIAGNOSIS — I71.21 ANEURYSM OF ASCENDING AORTA WITHOUT RUPTURE (HCC): ICD-10-CM

## 2023-09-05 DIAGNOSIS — Z00.00 MEDICARE ANNUAL WELLNESS VISIT, SUBSEQUENT: Primary | ICD-10-CM

## 2023-09-05 DIAGNOSIS — D50.0 IRON DEFICIENCY ANEMIA DUE TO CHRONIC BLOOD LOSS: ICD-10-CM

## 2023-09-05 DIAGNOSIS — I10 ESSENTIAL HYPERTENSION: ICD-10-CM

## 2023-09-05 PROCEDURE — G0439 PPPS, SUBSEQ VISIT: HCPCS | Performed by: INTERNAL MEDICINE

## 2023-09-05 PROCEDURE — 99213 OFFICE O/P EST LOW 20 MIN: CPT | Performed by: INTERNAL MEDICINE

## 2023-09-05 RX ORDER — OMEPRAZOLE 20 MG/1
20 CAPSULE, DELAYED RELEASE ORAL DAILY PRN
Qty: 90 CAPSULE | Refills: 3 | Status: SHIPPED | OUTPATIENT
Start: 2023-09-05

## 2023-09-05 NOTE — ASSESSMENT & PLAN NOTE
She has history of iron deficiency anemia. Her hemoglobin was normal in August.  Denies signs of GI/ bleeding.   Recheck iron studies in 6 months

## 2023-09-05 NOTE — PROGRESS NOTES
Assessment and Plan:     Problem List Items Addressed This Visit        Digestive    Gastroesophageal reflux disease without esophagitis     Patient has hiatal hernia with GERD. Her symptoms are overall controlled. She watches her diet. She takes omeprazole as needed for heartburn. She denies dysphagia. Continue the same as needed omeprazole. Relevant Medications    omeprazole (PriLOSEC) 20 mg delayed release capsule       Cardiovascular and Mediastinum    Essential hypertension     Patient's blood pressure is controlled. We reviewed blood results from August 30 that showed normal electrolytes, stable renal function, normal blood counts, normal thyroid function. We discussed her previous thyroid results that were mildly abnormal: TSH was above upper limit of normal but on August 30 it was in the normal range. I told patient that I doubted that the fluoride treatment for dentist was going to apply it would affect her thyroid function. Recheck electrolytes and thyroid function test before next visit in 6 months. Continue metoprolol, Diovan         Relevant Orders    Comprehensive metabolic panel    TSH, 3rd generation with Free T4 reflex    CBC    Aneurysm of ascending aorta without rupture ([address])     She had ascending aortic aneurysm in 2020 on review of CT of the chest.  Denies chest pain. Recheck CT of the chest to ascertain the size of the ascending aortic aneurysm         Relevant Orders    CT chest wo contrast       Other    Iron deficiency anemia due to chronic blood loss     She has history of iron deficiency anemia. Her hemoglobin was normal in August.  Denies signs of GI/ bleeding.   Recheck iron studies in 6 months         Relevant Orders    Iron Panel (Includes Ferritin, Iron Sat%, Iron, and TIBC)   Other Visit Diagnoses     Medicare annual wellness visit, subsequent    -  Primary          Depression Screening and Follow-up Plan: Patient was screened for depression during today's encounter. They screened negative with a PHQ-2 score of 0. Preventive health issues were discussed with patient, and age appropriate screening tests were ordered as noted in patient's After Visit Summary. Personalized health advice and appropriate referrals for health education or preventive services given if needed, as noted in patient's After Visit Summary. History of Present Illness:     Patient presents for a Medicare Wellness Visit    HPI   Patient Care Team:  Maisha Sunshine MD as PCP - MD Omkar Uribe MD Altha Birmingham, MD     Review of Systems:     Review of Systems   Constitutional: Negative for fever. HENT: Negative for hearing loss. Eyes: Negative for visual disturbance. Respiratory: Negative for cough and shortness of breath. Cardiovascular: Negative for chest pain and palpitations. Gastrointestinal: Negative for abdominal pain, blood in stool, constipation and diarrhea. Endocrine: Negative for polydipsia. Genitourinary: Negative for dysuria and hematuria. Musculoskeletal: Negative for back pain. Skin: Negative for rash. Neurological: Negative for headaches. Psychiatric/Behavioral: Negative for dysphoric mood. All other systems reviewed and are negative.        Problem List:     Patient Active Problem List   Diagnosis   • History of endometrial cancer   • Iron deficiency anemia due to chronic blood loss   • Essential hypertension   • Gastroesophageal reflux disease without esophagitis   • Other hyperlipidemia   • Seasonal allergic rhinitis due to pollen   • Aneurysm of ascending aorta without rupture (HCC)   • Hiatal hernia   • BRANDEE (generalized anxiety disorder)   • Morbid obesity with BMI of 45.0-49.9, adult [facility])      Past Medical and Surgical History:     Past Medical History:   Diagnosis Date   • Anxiety    • Cataract    • CVA (cerebrovascular accident) ([address])    • Depression    • GERD (gastroesophageal reflux disease) • Hearing loss on left    • Hypertension    • Morbid obesity ([address])    • Osteoarthritis    • Retinopathy    • TIA (transient ischemic attack)     2001     Past Surgical History:   Procedure Laterality Date   • EYE SURGERY Left 10/2016   • HYSTERECTOMY     • LYMPH NODE BIOPSY     • OOPHORECTOMY Bilateral    • PELVIC LAPAROSCOPY        Family History:     Family History   Problem Relation Age of Onset   • Breast cancer Sister    • Breast cancer additional onset Sister    • Prostate cancer Brother    • Heart failure Mother    • Anemia Mother    • Alzheimer's disease Father    • Substance Abuse Neg Hx         neg fam hx   • Mental illness Neg Hx         neg fam hx      Social History:     Social History     Socioeconomic History   • Marital status: Single     Spouse name: None   • Number of children: None   • Years of education: None   • Highest education level: None   Occupational History   • None   Tobacco Use   • Smoking status: Never   • Smokeless tobacco: Never   Vaping Use   • Vaping Use: Never used   Substance and Sexual Activity   • Alcohol use: Never   • Drug use: No   • Sexual activity: None   Other Topics Concern   • None   Social History Narrative    Uses seat belt    Caffeine use in the a.m. Islam    Dental care, regularly     Exercise: yarkwork    Lives by [employer]. Feels safe at home. Social Determinants of Health     Financial Resource Strain: Low Risk  (9/5/2023)    Overall Financial Resource Strain (CARDIA)    • Difficulty of Paying Living Expenses: Not hard at all   Food Insecurity: Not on file   Transportation Needs: No Transportation Needs (9/5/2023)    PRAPARE - Transportation    • Lack of Transportation (Medical): No    • Lack of Transportation (Non-Medical):  No   Physical Activity: Inactive (6/3/2021)    Exercise Vital Sign    • Days of Exercise per Week: 0 days    • Minutes of Exercise per Session: 0 min   Stress: Stress Concern Present (6/3/2021)    [employer] of Occupational Health - Occupational Stress Questionnaire    • Feeling of Stress : To some extent   Social Connections: Not on file   Intimate Partner Violence: Not At Risk (6/3/2021)    Humiliation, Afraid, Rape, and Kick questionnaire    • Fear of Current or Ex-Partner: No    • Emotionally Abused: No    • Physically Abused: No    • Sexually Abused: No   Housing Stability: Not on file      Medications and Allergies:     Current Outpatient Medications   Medication Sig Dispense Refill   • acetaminophen (TYLENOL) 325 mg tablet Take 650 mg by mouth every 4 (four) hours as needed for mild pain      • atorvastatin (LIPITOR) 10 mg tablet Take 1 tablet (10 mg total) by mouth daily 90 tablet 3   • Calcium Carb-Cholecalciferol (CALCIUM 600 + D PO) Take 1 capsule by mouth daily. • Cholecalciferol (VITAMIN D-3) 1000 UNITS CAPS Take 1 tablet by mouth daily. • LORazepam (ATIVAN) 1 mg tablet 1 tablet as needed at bedtime 90 tablet 1   • metoprolol succinate (TOPROL-XL) 25 mg 24 hr tablet Take 1 tablet (25 mg total) by mouth daily 90 tablet 3   • mometasone (NASONEX) 50 mcg/act nasal spray 2 sprays into each nostril daily (Patient taking differently: 2 sprays into each nostril daily as needed) 17 g 5   • multivitamin (THERAGRAN) TABS Take 1 tablet by mouth daily. • omeprazole (PriLOSEC) 20 mg delayed release capsule Take 1 capsule (20 mg total) by mouth daily as needed (heartburn) 90 capsule 3   • valsartan (DIOVAN) 320 MG tablet Take 1 tablet (320 mg total) by mouth daily 90 tablet 3     No current facility-administered medications for this visit. Allergies   Allergen Reactions   • Ace Inhibitors      Annotation - [id number]: COUGH   • Aspirin GI Intolerance     Category: Adverse Reaction;  Annotation - [id number]: GI DISTRESS   • Azithromycin Itching     Annotation - [id number]: PERIRECTAL IRRITATION   • Influenza Virus Vaccine       Immunizations:     Immunization History   Administered Date(s) Administered   • COVID-19 PFIZER VACCINE 0.3 ML IM 04/02/2021, 04/26/2021   • TD (adult) Preservative Free 01/01/2004      Health Maintenance: There are no preventive care reminders to display for this patient. Topic Date Due   • Pneumococcal Vaccine: 65+ Years (1 - PCV) Never done   • COVID-19 Vaccine (3 - Pfizer series) 06/21/2021   • Influenza Vaccine (1) 09/01/2023      Medicare Screening Tests and Risk Assessments:     Abeba Lopez is here for her Subsequent Wellness visit. Health Risk Assessment:   Patient rates overall health as good. Patient feels that their physical health rating is same. Patient is satisfied with their life. Eyesight was rated as same. Hearing was rated as same. Patient feels that their emotional and mental health rating is same. Patients states they are never, rarely angry. Patient states they are never, rarely unusually tired/fatigued. Pain experienced in the last 7 days has been none. Patient states that she has experienced no weight loss or gain in last 6 months. Depression Screening:   PHQ-2 Score: 0      Fall Risk Screening: In the past year, patient has experienced: no history of falling in past year      Urinary Incontinence Screening:   Patient has not leaked urine accidently in the last six months. Home Safety:  Patient does not have trouble with stairs inside or outside of their home. Patient has working smoke alarms and has no working carbon monoxide detector. Home safety hazards include: none. Nutrition:   Current diet is Regular. Medications:   Patient is currently taking over-the-counter supplements. OTC medications include: see medication list. Patient is able to manage medications. Activities of Daily Living (ADLs)/Instrumental Activities of Daily Living (IADLs):   Walk and transfer into and out of bed and chair?: Yes  Dress and groom yourself?: Yes    Bathe or shower yourself?: Yes    Feed yourself?  Yes  Do your laundry/housekeeping?: Yes  Manage your money, pay your bills and track your expenses?: Yes  Make your own meals?: Yes    Do your own shopping?: Yes    Previous Hospitalizations:   Any hospitalizations or ED visits within the last 12 months?: No      Advance Care Planning:   Living will: Yes    Advanced directive: Yes    Advanced directive counseling given: Yes    End of Life Decisions reviewed with patient: Yes      PREVENTIVE SCREENINGS      Cardiovascular Screening:    General: Screening Not Indicated, History Lipid Disorder, Risks and Benefits Discussed and Screening Current      Diabetes Screening:     General: Screening Current and Risks and Benefits Discussed      Colorectal Cancer Screening:     General: Screening Not Indicated and Risks and Benefits Discussed      Breast Cancer Screening:     General: Risks and Benefits Discussed and Screening Not Indicated      Cervical Cancer Screening:    General: Screening Not Indicated      Lung Cancer Screening:     General: Screening Not Indicated    Screening, Brief Intervention, and Referral to Treatment (SBIRT)    Screening  Typical number of drinks in a day: 0  Typical number of drinks in a week: 0  Interpretation: Low risk drinking behavior. Single Item Drug Screening:  How often have you used an illegal drug (including marijuana) or a prescription medication for non-medical reasons in the past year? never    Single Item Drug Screen Score: 0  Interpretation: Negative screen for possible drug use disorder    Other Counseling Topics:   Calcium and vitamin D intake. No results found. Physical Exam:     /72   Pulse 63   Resp 16   Ht 4' 10" (1.473 m)   Wt 99.3 kg (219 lb)   LMP  (LMP Unknown)   SpO2 94%   BMI 45.77 kg/m²     Physical Exam  Constitutional:       General: She is not in acute distress. Appearance: She is well-developed. She is not toxic-appearing. HENT:      Head: Normocephalic.       Right Ear: Tympanic membrane normal.      Left Ear: Tympanic membrane normal.   Eyes: Conjunctiva/sclera: Conjunctivae normal.   Cardiovascular:      Rate and Rhythm: Normal rate and regular rhythm. Heart sounds: No murmur heard. Pulmonary:      Effort: Pulmonary effort is normal. No respiratory distress. Breath sounds: No wheezing or rales. Abdominal:      General: Bowel sounds are normal.      Palpations: Abdomen is soft. Tenderness: There is no abdominal tenderness. Musculoskeletal:      Cervical back: Neck supple. Skin:     General: Skin is warm. Findings: No rash. Neurological:      Mental Status: She is alert and oriented to person, place, and time. Cranial Nerves: No cranial nerve deficit. Motor: No weakness. Psychiatric:         Mood and Affect: Mood normal.         Thought Content:  Thought content normal.          Nadia Ayala MD

## 2023-09-05 NOTE — ASSESSMENT & PLAN NOTE
Patient has hiatal hernia with GERD. Her symptoms are overall controlled. She watches her diet. She takes omeprazole as needed for heartburn. She denies dysphagia. Continue the same as needed omeprazole.

## 2023-09-05 NOTE — ASSESSMENT & PLAN NOTE
Patient's blood pressure is controlled. We reviewed blood results from August 30 that showed normal electrolytes, stable renal function, normal blood counts, normal thyroid function. We discussed her previous thyroid results that were mildly abnormal: TSH was above upper limit of normal but on August 30 it was in the normal range. I told patient that I doubted that the fluoride treatment for dentist was going to apply it would affect her thyroid function. Recheck electrolytes and thyroid function test before next visit in 6 months.   Continue metoprolol, Diovan

## 2023-09-05 NOTE — PATIENT INSTRUCTIONS
Medicare Preventive Visit Patient Instructions  Thank you for completing your Welcome to Medicare Visit or Medicare Annual Wellness Visit today. Your next wellness visit will be due in one year (9/5/2024). The screening/preventive services that you may require over the next 5-10 years are detailed below. Some tests may not apply to you based off risk factors and/or age. Screening tests ordered at today's visit but not completed yet may show as past due. Also, please note that scanned in results may not display below. Preventive Screenings:  Service Recommendations Previous Testing/Comments   Colorectal Cancer Screening  * Colonoscopy    * Fecal Occult Blood Test (FOBT)/Fecal Immunochemical Test (FIT)  * Fecal DNA/Cologuard Test  * Flexible Sigmoidoscopy Age: 43-73 years old   Colonoscopy: every 10 years (may be performed more frequently if at higher risk)  OR  FOBT/FIT: every 1 year  OR  Cologuard: every 3 years  OR  Sigmoidoscopy: every 5 years  Screening may be recommended earlier than age 39 if at higher risk for colorectal cancer. Also, an individualized decision between you and your healthcare provider will decide whether screening between the ages of 77-80 would be appropriate. Colonoscopy: 07/16/2018  FOBT/FIT: Not on file  Cologuard: Not on file  Sigmoidoscopy: Not on file    Screening Not Indicated     Breast Cancer Screening Age: 36 years old  Frequency: every 1-2 years  Not required if history of left and right mastectomy Mammogram: Not on file        Cervical Cancer Screening Between the ages of 21-29, pap smear recommended once every 3 years. Between the ages of 32-69, can perform pap smear with HPV co-testing every 5 years.    Recommendations may differ for women with a history of total hysterectomy, cervical cancer, or abnormal pap smears in past. Pap Smear: Not on file    Screening Not Indicated   Hepatitis C Screening Once for adults born between 1945 and 1965  More frequently in patients at high risk for Hepatitis C Hep C Antibody: Not on file        Diabetes Screening 1-2 times per year if you're at risk for diabetes or have pre-diabetes Fasting glucose: 97 mg/dL (8/30/2023)  A1C: No results in last 5 years (No results in last 5 years)  Screening Current   Cholesterol Screening Once every 5 years if you don't have a lipid disorder. May order more often based on risk factors. Lipid panel: 08/30/2023    Screening Not Indicated  History Lipid Disorder     Other Preventive Screenings Covered by Medicare:  1. Abdominal Aortic Aneurysm (AAA) Screening: covered once if your at risk. You're considered to be at risk if you have a family history of AAA. 2. Lung Cancer Screening: covers low dose CT scan once per year if you meet all of the following conditions: (1) Age 48-67; (2) No signs or symptoms of lung cancer; (3) Current smoker or have quit smoking within the last 15 years; (4) You have a tobacco smoking history of at least 20 pack years (packs per day multiplied by number of years you smoked); (5) You get a written order from a healthcare provider. 3. Glaucoma Screening: covered annually if you're considered high risk: (1) You have diabetes OR (2) Family history of glaucoma OR (3)  aged 48 and older OR (3)  American aged 72 and older  3. Osteoporosis Screening: covered every 2 years if you meet one of the following conditions: (1) You're estrogen deficient and at risk for osteoporosis based off medical history and other findings; (2) Have a vertebral abnormality; (3) On glucocorticoid therapy for more than 3 months; (4) Have primary hyperparathyroidism; (5) On osteoporosis medications and need to assess response to drug therapy. · Last bone density test (DXA Scan): 07/22/2019.  5. HIV Screening: covered annually if you're between the age of 15-65. Also covered annually if you are younger than 13 and older than 72 with risk factors for HIV infection.  For pregnant patients, it is covered up to 3 times per pregnancy. Immunizations:  Immunization Recommendations   Influenza Vaccine Annual influenza vaccination during flu season is recommended for all persons aged >= 6 months who do not have contraindications   Pneumococcal Vaccine   * Pneumococcal conjugate vaccine = PCV13 (Prevnar 13), PCV15 (Vaxneuvance), PCV20 (Prevnar 20)  * Pneumococcal polysaccharide vaccine = PPSV23 (Pneumovax) Adults 20-63 years old: 1-3 doses may be recommended based on certain risk factors  Adults 72 years old: 1-2 doses may be recommended based off what pneumonia vaccine you previously received   Hepatitis B Vaccine 3 dose series if at intermediate or high risk (ex: diabetes, end stage renal disease, liver disease)   Tetanus (Td) Vaccine - COST NOT COVERED BY MEDICARE PART B Following completion of primary series, a booster dose should be given every 10 years to maintain immunity against tetanus. Td may also be given as tetanus wound prophylaxis. Tdap Vaccine - COST NOT COVERED BY MEDICARE PART B Recommended at least once for all adults. For pregnant patients, recommended with each pregnancy. Shingles Vaccine (Shingrix) - COST NOT COVERED BY MEDICARE PART B  2 shot series recommended in those aged 48 and above     Health Maintenance Due:  There are no preventive care reminders to display for this patient. Immunizations Due:      Topic Date Due   • Pneumococcal Vaccine: 65+ Years (1 - PCV) Never done   • COVID-19 Vaccine (3 - Pfizer series) 06/21/2021   • Influenza Vaccine (1) 09/01/2023     Advance Directives   What are advance directives? Advance directives are legal documents that state your wishes and plans for medical care. These plans are made ahead of time in case you lose your ability to make decisions for yourself. Advance directives can apply to any medical decision, such as the treatments you want, and if you want to donate organs. What are the types of advance directives?   There are many types of advance directives, and each state has rules about how to use them. You may choose a combination of any of the following:  · Living will: This is a written record of the treatment you want. You can also choose which treatments you do not want, which to limit, and which to stop at a certain time. This includes surgery, medicine, IV fluid, and tube feedings. · Durable power of  for healthcare [facility] [place]): This is a written record that states who you want to make healthcare choices for you when you are unable to make them for yourself. This person, called a proxy, is usually a family member or a friend. You may choose more than 1 proxy. · Do not resuscitate (DNR) order:  A DNR order is used in case your heart stops beating or you stop breathing. It is a request not to have certain forms of treatment, such as CPR. A DNR order may be included in other types of advance directives. · Medical directive: This covers the care that you want if you are in a coma, near death, or unable to make decisions for yourself. You can list the treatments you want for each condition. Treatment may include pain medicine, surgery, blood transfusions, dialysis, IV or tube feedings, and a ventilator (breathing machine). · Values history: This document has questions about your views, beliefs, and how you feel and think about life. This information can help others choose the care that you would choose. Why are advance directives important? An advance directive helps you control your care. Although spoken wishes may be used, it is better to have your wishes written down. Spoken wishes can be misunderstood, or not followed. Treatments may be given even if you do not want them. An advance directive may make it easier for your family to make difficult choices about your care.    Weight Management   Why it is important to manage your weight:  Being overweight increases your risk of health conditions such as heart disease, high blood pressure, type 2 diabetes, and certain types of cancer. It can also increase your risk for osteoarthritis, sleep apnea, and other respiratory problems. Aim for a slow, steady weight loss. Even a small amount of weight loss can lower your risk of health problems. How to lose weight safely:  A safe and healthy way to lose weight is to eat fewer calories and get regular exercise. You can lose up about 1 pound a week by decreasing the number of calories you eat by 500 calories each day. Healthy meal plan for weight management:  A healthy meal plan includes a variety of foods, contains fewer calories, and helps you stay healthy. A healthy meal plan includes the following:  · Eat whole-grain foods more often. A healthy meal plan should contain fiber. Fiber is the part of grains, fruits, and vegetables that is not broken down by your body. Whole-grain foods are healthy and provide extra fiber in your diet. Some examples of whole-grain foods are whole-wheat breads and pastas, oatmeal, brown rice, and bulgur. · Eat a variety of vegetables every day. Include dark, leafy greens such as spinach, kale, mary greens, and mustard greens. Eat yellow and orange vegetables such as carrots, sweet potatoes, and winter squash. · Eat a variety of fruits every day. Choose fresh or canned fruit (canned in its own juice or light syrup) instead of juice. Fruit juice has very little or no fiber. · Eat low-fat dairy foods. Drink fat-free (skim) milk or 1% milk. Eat fat-free yogurt and low-fat cottage cheese. Try low-fat cheeses such as mozzarella and other reduced-fat cheeses. · Choose meat and other protein foods that are low in fat. Choose beans or other legumes such as split peas or lentils. Choose fish, skinless poultry (chicken or turkey), or lean cuts of red meat (beef or pork). Before you cook meat or poultry, cut off any visible fat. · Use less fat and oil. Try baking foods instead of frying them.  Add less fat, such as margarine, sour cream, regular salad dressing and mayonnaise to foods. Eat fewer high-fat foods. Some examples of high-fat foods include french fries, doughnuts, ice cream, and cakes. · Eat fewer sweets. Limit foods and drinks that are high in sugar. This includes candy, cookies, regular soda, and sweetened drinks. Exercise:  Exercise at least 30 minutes per day on most days of the week. Some examples of exercise include walking, biking, dancing, and swimming. You can also fit in more physical activity by taking the stairs instead of the elevator or parking farther away from stores. Ask your healthcare provider about the best exercise plan for you. © Copyright [employer] 2018 Information is for End User's use only and may not be sold, redistributed or otherwise used for commercial purposes. All illustrations and images included in CareNotes® are the copyrighted property of [employer]. or [facility] Preventive Visit Patient Instructions  Thank you for completing your Welcome to Medicare Visit or Medicare Annual Wellness Visit today. Your next wellness visit will be due in one year (9/5/2024). The screening/preventive services that you may require over the next 5-10 years are detailed below. Some tests may not apply to you based off risk factors and/or age. Screening tests ordered at today's visit but not completed yet may show as past due. Also, please note that scanned in results may not display below.   Preventive Screenings:  Service Recommendations Previous Testing/Comments   Colorectal Cancer Screening  * Colonoscopy    * Fecal Occult Blood Test (FOBT)/Fecal Immunochemical Test (FIT)  * Fecal DNA/Cologuard Test  * Flexible Sigmoidoscopy Age: 43-73 years old   Colonoscopy: every 10 years (may be performed more frequently if at higher risk)  OR  FOBT/FIT: every 1 year  OR  Cologuard: every 3 years  OR  Sigmoidoscopy: every 5 years  Screening may be recommended earlier than age 39 if at higher risk for colorectal cancer. Also, an individualized decision between you and your healthcare provider will decide whether screening between the ages of 77-80 would be appropriate. Colonoscopy: 07/16/2018  FOBT/FIT: Not on file  Cologuard: Not on file  Sigmoidoscopy: Not on file    Screening Not Indicated     Breast Cancer Screening Age: 36 years old  Frequency: every 1-2 years  Not required if history of left and right mastectomy Mammogram: Not on file        Cervical Cancer Screening Between the ages of 21-29, pap smear recommended once every 3 years. Between the ages of 32-69, can perform pap smear with HPV co-testing every 5 years. Recommendations may differ for women with a history of total hysterectomy, cervical cancer, or abnormal pap smears in past. Pap Smear: Not on file    Screening Not Indicated   Hepatitis C Screening Once for adults born between 1945 and 1965  More frequently in patients at high risk for Hepatitis C Hep C Antibody: Not on file        Diabetes Screening 1-2 times per year if you're at risk for diabetes or have pre-diabetes Fasting glucose: 97 mg/dL (8/30/2023)  A1C: No results in last 5 years (No results in last 5 years)  Screening Current   Cholesterol Screening Once every 5 years if you don't have a lipid disorder. May order more often based on risk factors. Lipid panel: 08/30/2023    Screening Not Indicated  History Lipid Disorder     Other Preventive Screenings Covered by Medicare:  6. Abdominal Aortic Aneurysm (AAA) Screening: covered once if your at risk. You're considered to be at risk if you have a family history of AAA.   7. Lung Cancer Screening: covers low dose CT scan once per year if you meet all of the following conditions: (1) Age 48-67; (2) No signs or symptoms of lung cancer; (3) Current smoker or have quit smoking within the last 15 years; (4) You have a tobacco smoking history of at least 20 pack years (packs per day multiplied by number of years you smoked); (5) You get a written order from a healthcare provider. 8. Glaucoma Screening: covered annually if you're considered high risk: (1) You have diabetes OR (2) Family history of glaucoma OR (3)  aged 48 and older OR (3)  American aged 72 and older  5. Osteoporosis Screening: covered every 2 years if you meet one of the following conditions: (1) You're estrogen deficient and at risk for osteoporosis based off medical history and other findings; (2) Have a vertebral abnormality; (3) On glucocorticoid therapy for more than 3 months; (4) Have primary hyperparathyroidism; (5) On osteoporosis medications and need to assess response to drug therapy. · Last bone density test (DXA Scan): 07/22/2019.  10. HIV Screening: covered annually if you're between the age of 15-65. Also covered annually if you are younger than 13 and older than 72 with risk factors for HIV infection. For pregnant patients, it is covered up to 3 times per pregnancy. Immunizations:  Immunization Recommendations   Influenza Vaccine Annual influenza vaccination during flu season is recommended for all persons aged >= 6 months who do not have contraindications   Pneumococcal Vaccine   * Pneumococcal conjugate vaccine = PCV13 (Prevnar 13), PCV15 (Vaxneuvance), PCV20 (Prevnar 20)  * Pneumococcal polysaccharide vaccine = PPSV23 (Pneumovax) Adults 20-63 years old: 1-3 doses may be recommended based on certain risk factors  Adults 72 years old: 1-2 doses may be recommended based off what pneumonia vaccine you previously received   Hepatitis B Vaccine 3 dose series if at intermediate or high risk (ex: diabetes, end stage renal disease, liver disease)   Tetanus (Td) Vaccine - COST NOT COVERED BY MEDICARE PART B Following completion of primary series, a booster dose should be given every 10 years to maintain immunity against tetanus. Td may also be given as tetanus wound prophylaxis.    Tdap Vaccine - COST NOT COVERED BY MEDICARE PART B Recommended at least once for all adults. For pregnant patients, recommended with each pregnancy. Shingles Vaccine (Shingrix) - COST NOT COVERED BY MEDICARE PART B  2 shot series recommended in those aged 48 and above     Health Maintenance Due:  There are no preventive care reminders to display for this patient. Immunizations Due:      Topic Date Due   • Pneumococcal Vaccine: 65+ Years (1 - PCV) Never done   • COVID-19 Vaccine (3 - Pfizer series) 06/21/2021   • Influenza Vaccine (1) 09/01/2023     Advance Directives   What are advance directives? Advance directives are legal documents that state your wishes and plans for medical care. These plans are made ahead of time in case you lose your ability to make decisions for yourself. Advance directives can apply to any medical decision, such as the treatments you want, and if you want to donate organs. What are the types of advance directives? There are many types of advance directives, and each state has rules about how to use them. You may choose a combination of any of the following:  · Living will: This is a written record of the treatment you want. You can also choose which treatments you do not want, which to limit, and which to stop at a certain time. This includes surgery, medicine, IV fluid, and tube feedings. · Durable power of  for healthcare [facility]): This is a written record that states who you want to make healthcare choices for you when you are unable to make them for yourself. This person, called a proxy, is usually a family member or a friend. You may choose more than 1 proxy. · Do not resuscitate (DNR) order:  A DNR order is used in case your heart stops beating or you stop breathing. It is a request not to have certain forms of treatment, such as CPR. A DNR order may be included in other types of advance directives. · Medical directive:   This covers the care that you want if you are in a coma, near death, or unable to make decisions for yourself. You can list the treatments you want for each condition. Treatment may include pain medicine, surgery, blood transfusions, dialysis, IV or tube feedings, and a ventilator (breathing machine). · Values history: This document has questions about your views, beliefs, and how you feel and think about life. This information can help others choose the care that you would choose. Why are advance directives important? An advance directive helps you control your care. Although spoken wishes may be used, it is better to have your wishes written down. Spoken wishes can be misunderstood, or not followed. Treatments may be given even if you do not want them. An advance directive may make it easier for your family to make difficult choices about your care. Weight Management   Why it is important to manage your weight:  Being overweight increases your risk of health conditions such as heart disease, high blood pressure, type 2 diabetes, and certain types of cancer. It can also increase your risk for osteoarthritis, sleep apnea, and other respiratory problems. Aim for a slow, steady weight loss. Even a small amount of weight loss can lower your risk of health problems. How to lose weight safely:  A safe and healthy way to lose weight is to eat fewer calories and get regular exercise. You can lose up about 1 pound a week by decreasing the number of calories you eat by 500 calories each day. Healthy meal plan for weight management:  A healthy meal plan includes a variety of foods, contains fewer calories, and helps you stay healthy. A healthy meal plan includes the following:  · Eat whole-grain foods more often. A healthy meal plan should contain fiber. Fiber is the part of grains, fruits, and vegetables that is not broken down by your body. Whole-grain foods are healthy and provide extra fiber in your diet.  Some examples of whole-grain foods are whole-wheat breads and pastas, oatmeal, brown rice, and bulgur. · Eat a variety of vegetables every day. Include dark, leafy greens such as spinach, kale, mary greens, and mustard greens. Eat yellow and orange vegetables such as carrots, sweet potatoes, and winter squash. · Eat a variety of fruits every day. Choose fresh or canned fruit (canned in its own juice or light syrup) instead of juice. Fruit juice has very little or no fiber. · Eat low-fat dairy foods. Drink fat-free (skim) milk or 1% milk. Eat fat-free yogurt and low-fat cottage cheese. Try low-fat cheeses such as mozzarella and other reduced-fat cheeses. · Choose meat and other protein foods that are low in fat. Choose beans or other legumes such as split peas or lentils. Choose fish, skinless poultry (chicken or turkey), or lean cuts of red meat (beef or pork). Before you cook meat or poultry, cut off any visible fat. · Use less fat and oil. Try baking foods instead of frying them. Add less fat, such as margarine, sour cream, regular salad dressing and mayonnaise to foods. Eat fewer high-fat foods. Some examples of high-fat foods include french fries, doughnuts, ice cream, and cakes. · Eat fewer sweets. Limit foods and drinks that are high in sugar. This includes candy, cookies, regular soda, and sweetened drinks. Exercise:  Exercise at least 30 minutes per day on most days of the week. Some examples of exercise include walking, biking, dancing, and swimming. You can also fit in more physical activity by taking the stairs instead of the elevator or parking farther away from stores. Ask your healthcare provider about the best exercise plan for you. © Copyright [employer] 2018 Information is for End User's use only and may not be sold, redistributed or otherwise used for commercial purposes.  All illustrations and images included in CareNotes® are the copyrighted property of [employer] or [address] [place] [address]

## 2023-09-05 NOTE — ASSESSMENT & PLAN NOTE
She had ascending aortic aneurysm in 2020 on review of CT of the chest.  Denies chest pain.   Recheck CT of the chest to ascertain the size of the ascending aortic aneurysm

## 2023-09-21 ENCOUNTER — HOSPITAL ENCOUNTER (OUTPATIENT)
Dept: CT IMAGING | Facility: HOSPITAL | Age: 83
Discharge: HOME | End: 2023-09-21
Attending: INTERNAL MEDICINE
Payer: MEDICARE

## 2023-09-21 DIAGNOSIS — I71.21 ANEURYSM OF ASCENDING AORTA WITHOUT RUPTURE (HCC): ICD-10-CM

## 2023-09-21 PROCEDURE — 71250 CT THORAX DX C-: CPT

## 2023-09-21 PROCEDURE — G1004 CDSM NDSC: HCPCS

## 2023-09-29 NOTE — RESULT ENCOUNTER NOTE
Call patient: CT of the chest shows a very mild dilatation of the major artery called the aorta, it increased a very small amount from 40 mm to 42 mm over 3 years.   We will recheck the size in a year

## 2023-11-08 DIAGNOSIS — F41.9 ANXIETY: ICD-10-CM

## 2023-11-08 RX ORDER — LORAZEPAM 1 MG/1
TABLET ORAL
Qty: 90 TABLET | Refills: 1 | Status: SHIPPED | OUTPATIENT
Start: 2023-11-08

## 2023-11-27 DIAGNOSIS — I10 ESSENTIAL HYPERTENSION: ICD-10-CM

## 2023-11-27 RX ORDER — VALSARTAN 320 MG/1
320 TABLET ORAL DAILY
Qty: 90 TABLET | Refills: 3 | Status: SHIPPED | OUTPATIENT
Start: 2023-11-27 | End: 2024-11-21

## 2024-01-25 DIAGNOSIS — I10 ESSENTIAL HYPERTENSION: ICD-10-CM

## 2024-01-25 RX ORDER — METOPROLOL SUCCINATE 25 MG/1
25 TABLET, EXTENDED RELEASE ORAL DAILY
Qty: 90 TABLET | Refills: 3 | Status: SHIPPED | OUTPATIENT
Start: 2024-01-25 | End: 2025-01-19

## 2024-02-09 NOTE — ASSESSMENT & PLAN NOTE
Ct chest showed 4 1 cm thoracic aortic aneurysm in 2/2018  Will repeat it  Continue toprol and lipitor This was a shared visit with the RANDI. I reviewed and verified the documentation.

## 2024-03-13 ENCOUNTER — LAB (OUTPATIENT)
Dept: LAB | Facility: HOSPITAL | Age: 84
End: 2024-03-13
Payer: MEDICARE

## 2024-03-13 DIAGNOSIS — D50.0 IRON DEFICIENCY ANEMIA DUE TO CHRONIC BLOOD LOSS: ICD-10-CM

## 2024-03-13 DIAGNOSIS — I10 ESSENTIAL HYPERTENSION: ICD-10-CM

## 2024-03-13 LAB
ALBUMIN SERPL BCP-MCNC: 3.9 G/DL (ref 3.5–5)
ALP SERPL-CCNC: 72 U/L (ref 34–104)
ALT SERPL W P-5'-P-CCNC: 19 U/L (ref 7–52)
ANION GAP SERPL CALCULATED.3IONS-SCNC: 7 MMOL/L (ref 4–13)
AST SERPL W P-5'-P-CCNC: 27 U/L (ref 13–39)
BILIRUB SERPL-MCNC: 0.62 MG/DL (ref 0.2–1)
BUN SERPL-MCNC: 26 MG/DL (ref 5–25)
CALCIUM SERPL-MCNC: 9.3 MG/DL (ref 8.4–10.2)
CHLORIDE SERPL-SCNC: 105 MMOL/L (ref 96–108)
CO2 SERPL-SCNC: 28 MMOL/L (ref 21–32)
CREAT SERPL-MCNC: 0.83 MG/DL (ref 0.6–1.3)
ERYTHROCYTE [DISTWIDTH] IN BLOOD BY AUTOMATED COUNT: 12.6 % (ref 11.6–15.1)
FERRITIN SERPL-MCNC: 14 NG/ML (ref 11–307)
GFR SERPL CREATININE-BSD FRML MDRD: 64 ML/MIN/1.73SQ M
GLUCOSE P FAST SERPL-MCNC: 102 MG/DL (ref 65–99)
HCT VFR BLD AUTO: 40.2 % (ref 34.8–46.1)
HGB BLD-MCNC: 12.3 G/DL (ref 11.5–15.4)
IRON SATN MFR SERPL: 30 % (ref 15–50)
IRON SERPL-MCNC: 115 UG/DL (ref 50–212)
MCH RBC QN AUTO: 28.3 PG (ref 26.8–34.3)
MCHC RBC AUTO-ENTMCNC: 30.6 G/DL (ref 31.4–37.4)
MCV RBC AUTO: 92 FL (ref 82–98)
PLATELET # BLD AUTO: 213 THOUSANDS/UL (ref 149–390)
PMV BLD AUTO: 10.8 FL (ref 8.9–12.7)
POTASSIUM SERPL-SCNC: 4.2 MMOL/L (ref 3.5–5.3)
PROT SERPL-MCNC: 6.6 G/DL (ref 6.4–8.4)
RBC # BLD AUTO: 4.35 MILLION/UL (ref 3.81–5.12)
SODIUM SERPL-SCNC: 140 MMOL/L (ref 135–147)
TIBC SERPL-MCNC: 386 UG/DL (ref 250–450)
TSH SERPL DL<=0.05 MIU/L-ACNC: 4.39 UIU/ML (ref 0.45–4.5)
UIBC SERPL-MCNC: 271 UG/DL (ref 155–355)
WBC # BLD AUTO: 4.12 THOUSAND/UL (ref 4.31–10.16)

## 2024-03-13 PROCEDURE — 83540 ASSAY OF IRON: CPT

## 2024-03-13 PROCEDURE — 82728 ASSAY OF FERRITIN: CPT

## 2024-03-13 PROCEDURE — 80053 COMPREHEN METABOLIC PANEL: CPT

## 2024-03-13 PROCEDURE — 36415 COLL VENOUS BLD VENIPUNCTURE: CPT

## 2024-03-13 PROCEDURE — 83550 IRON BINDING TEST: CPT

## 2024-03-13 PROCEDURE — 85027 COMPLETE CBC AUTOMATED: CPT

## 2024-03-13 PROCEDURE — 84443 ASSAY THYROID STIM HORMONE: CPT

## 2024-03-13 NOTE — RESULT ENCOUNTER NOTE
Call patient: Labs Show normal electrolytes, normal liver function tests, normal blood counts, her blood cell counts are normal.  We will discuss all these in the office next week

## 2024-03-19 ENCOUNTER — OFFICE VISIT (OUTPATIENT)
Dept: FAMILY MEDICINE CLINIC | Facility: HOSPITAL | Age: 84
End: 2024-03-19
Payer: MEDICARE

## 2024-03-19 VITALS
SYSTOLIC BLOOD PRESSURE: 122 MMHG | DIASTOLIC BLOOD PRESSURE: 76 MMHG | HEIGHT: 58 IN | WEIGHT: 215 LBS | BODY MASS INDEX: 45.13 KG/M2 | OXYGEN SATURATION: 94 % | RESPIRATION RATE: 16 BRPM | HEART RATE: 60 BPM | TEMPERATURE: 97.6 F

## 2024-03-19 DIAGNOSIS — I10 ESSENTIAL HYPERTENSION: ICD-10-CM

## 2024-03-19 DIAGNOSIS — K21.9 GASTROESOPHAGEAL REFLUX DISEASE WITHOUT ESOPHAGITIS: ICD-10-CM

## 2024-03-19 DIAGNOSIS — I71.21 ANEURYSM OF ASCENDING AORTA WITHOUT RUPTURE (HCC): ICD-10-CM

## 2024-03-19 DIAGNOSIS — G89.29 CHRONIC RIGHT-SIDED LOW BACK PAIN WITHOUT SCIATICA: Primary | ICD-10-CM

## 2024-03-19 DIAGNOSIS — M54.50 CHRONIC RIGHT-SIDED LOW BACK PAIN WITHOUT SCIATICA: Primary | ICD-10-CM

## 2024-03-19 DIAGNOSIS — E66.01 MORBID OBESITY WITH BMI OF 45.0-49.9, ADULT (HCC): ICD-10-CM

## 2024-03-19 PROCEDURE — G2211 COMPLEX E/M VISIT ADD ON: HCPCS | Performed by: INTERNAL MEDICINE

## 2024-03-19 PROCEDURE — 99214 OFFICE O/P EST MOD 30 MIN: CPT | Performed by: INTERNAL MEDICINE

## 2024-03-19 NOTE — PROGRESS NOTES
Assessment/Plan:    Gastroesophageal reflux disease without esophagitis  Gerd is stable, denies heartburn, dysphagia or signs of bleeding.  Hgb was normal this month  Abdomen is soft and non-tender      Essential hypertension  BP is controlled  Electrolytes were normal and renal fxn stable this month  Continue valsartan, toprol        Chronic right-sided low back pain without sciatica  Pt has intermittent right sided low back pain since the end of November with radiation across the back but not down to the right buttocks or lower leg. It's worse when lifting and carrying objects around. Denies fever, LE weakness, paresthesias, new incontinence.  Ibuprofen 200mg prn alleviates the discomfort, tylenol doesn't help.    She has mild tenderness of the right lower paralumbar area. Straight leg raising is negative. LE muscles strength/bulk/tone are normal, gait is normal    Will get xr of spine to look for spondylosis or compression frx.  Pt will check if spinal PT is covered.  I told her that taking ibuprofen 200mg three pills a week should not cause damage to her.  She will also try heating pads, lidocaine patch.    Aneurysm of ascending aorta without rupture (HCC)  Ct chest showed thoracic aortic aneurysm of 42mm  Denies chest pain  Repeat ct chest in September    Morbid obesity with BMI of 45.0-49.9, adult ([facility])  BMI is 44.94  Pt is working on diet     Diagnoses and all orders for this visit:    Chronic right-sided low back pain without sciatica  -     XR spine lumbar minimum 4 views non injury; Future    Gastroesophageal reflux disease without esophagitis    Essential hypertension  -     Comprehensive metabolic panel; Future  -     CBC; Future    Aneurysm of ascending aorta without rupture (HCC)    Morbid obesity with BMI of 45.0-49.9, adult ([facility])          Subjective:      Patient ID: Davida Ring is a 83 y.o. female.      HPI    Patient presents for follow-up of chronic conditions as detailed in the assessment  "and plan.      The following portions of the patient's history were reviewed and updated as appropriate: current medications, past family history, past medical history, past social history, past surgical history and problem list.    Review of Systems      Objective:    /76   Pulse 60   Temp 97.6 °F (36.4 °C) (Tympanic)   Resp 16   Ht 4' 10\" (1.473 m)   Wt 97.5 kg (215 lb)   LMP  (LMP Unknown)   SpO2 94%   BMI 44.94 kg/m²      Physical Exam  Constitutional:       General: She is not in acute distress.     Appearance: She is not toxic-appearing.   Cardiovascular:      Rate and Rhythm: Normal rate and regular rhythm.      Heart sounds: No murmur heard.  Pulmonary:      Effort: No respiratory distress.      Breath sounds: No wheezing or rales.   Abdominal:      General: Bowel sounds are normal.      Palpations: Abdomen is soft.      Tenderness: There is no abdominal tenderness.   Musculoskeletal:         General: Tenderness present.   Neurological:      Mental Status: She is alert.      Cranial Nerves: No cranial nerve deficit.      Motor: No weakness.      Gait: Gait normal.   Psychiatric:         Mood and Affect: Mood normal.         Thought Content: Thought content normal.           Depression Screening and Follow-up Plan: Patient was screened for depression during today's encounter. They screened negative with a PHQ-2 score of 0.        Dorcas Schumacher MD  "

## 2024-03-19 NOTE — ASSESSMENT & PLAN NOTE
BP is controlled  Electrolytes were normal and renal fxn stable this month  Continue valsartan, toprol

## 2024-03-19 NOTE — ASSESSMENT & PLAN NOTE
Gerd is stable, denies heartburn, dysphagia or signs of bleeding.  Hgb was normal this month  Abdomen is soft and non-tender

## 2024-03-19 NOTE — ASSESSMENT & PLAN NOTE
Pt has intermittent right sided low back pain since the end of November with radiation across the back but not down to the right buttocks or lower leg. It's worse when lifting and carrying objects around. Denies fever, LE weakness, paresthesias, new incontinence.  Ibuprofen 200mg prn alleviates the discomfort, tylenol doesn't help.    She has mild tenderness of the right lower paralumbar area. Straight leg raising is negative. LE muscles strength/bulk/tone are normal, gait is normal    Will get xr of spine to look for spondylosis or compression frx.  Pt will check if spinal PT is covered.  I told her that taking ibuprofen 200mg three pills a week should not cause damage to her.  She will also try heating pads, lidocaine patch.

## 2024-03-20 ENCOUNTER — HOSPITAL ENCOUNTER (OUTPATIENT)
Dept: RADIOLOGY | Facility: HOSPITAL | Age: 84
Discharge: HOME | End: 2024-03-20
Payer: MEDICARE

## 2024-03-20 DIAGNOSIS — G89.29 CHRONIC RIGHT-SIDED LOW BACK PAIN WITHOUT SCIATICA: ICD-10-CM

## 2024-03-20 DIAGNOSIS — M54.50 CHRONIC RIGHT-SIDED LOW BACK PAIN WITHOUT SCIATICA: ICD-10-CM

## 2024-03-20 PROCEDURE — 72110 X-RAY EXAM L-2 SPINE 4/>VWS: CPT

## 2024-03-20 NOTE — RESULT ENCOUNTER NOTE
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  The arthritis causes her pain most likely

## 2024-04-30 DIAGNOSIS — F41.9 ANXIETY: ICD-10-CM

## 2024-04-30 RX ORDER — LORAZEPAM 1 MG/1
TABLET ORAL
Qty: 90 TABLET | Refills: 1 | Status: SHIPPED | OUTPATIENT
Start: 2024-04-30

## 2024-04-30 NOTE — TELEPHONE ENCOUNTER
Reason for call:   [x] Refill   [] Prior Auth  [] Other:     Office:   [x] PCP/Provider -   [] Specialty/Provider -     Medication: XANAX    Dose/Frequency: 1 MG    Quantity: 90    Pharmacy:   [id number] - [place] - [address] [address]  Phone: [phone]  Fax: [phone]  SELAM #: [id number]     Does the patient have enough for 3 days?   [] Yes   [x] No - Send as HP to POD

## 2024-05-20 DIAGNOSIS — E78.49 OTHER HYPERLIPIDEMIA: ICD-10-CM

## 2024-05-20 NOTE — TELEPHONE ENCOUNTER
Reason for call:   [x] Refill   [] Prior Auth  [] Other:     Office:   [x] PCP/Provider -[facility] [address]/Dorcas Schumacher       [] Specialty/Provider -     Medication: Lipitor    Dose/Frequency: 10 mg     Quantity: #90    Pharmacy: [facility] [address]    Does the patient have enough for 3 days?   [x] Yes   [] No - Send as HP to POD

## 2024-05-21 RX ORDER — ATORVASTATIN CALCIUM 10 MG/1
10 TABLET, FILM COATED ORAL DAILY
Qty: 90 TABLET | Refills: 3 | Status: SHIPPED | OUTPATIENT
Start: 2024-05-21

## 2024-09-11 ENCOUNTER — APPOINTMENT (OUTPATIENT)
Dept: LAB | Facility: HOSPITAL | Age: 84
End: 2024-09-11
Payer: MEDICARE

## 2024-09-11 DIAGNOSIS — I10 ESSENTIAL HYPERTENSION: ICD-10-CM

## 2024-09-11 LAB
ALBUMIN SERPL BCG-MCNC: 3.9 G/DL (ref 3.5–5)
ALP SERPL-CCNC: 70 U/L (ref 34–104)
ALT SERPL W P-5'-P-CCNC: 20 U/L (ref 7–52)
ANION GAP SERPL CALCULATED.3IONS-SCNC: 8 MMOL/L (ref 4–13)
AST SERPL W P-5'-P-CCNC: 29 U/L (ref 13–39)
BILIRUB SERPL-MCNC: 0.64 MG/DL (ref 0.2–1)
BUN SERPL-MCNC: 22 MG/DL (ref 5–25)
CALCIUM SERPL-MCNC: 9.1 MG/DL (ref 8.4–10.2)
CHLORIDE SERPL-SCNC: 103 MMOL/L (ref 96–108)
CO2 SERPL-SCNC: 31 MMOL/L (ref 21–32)
CREAT SERPL-MCNC: 0.82 MG/DL (ref 0.6–1.3)
ERYTHROCYTE [DISTWIDTH] IN BLOOD BY AUTOMATED COUNT: 13.5 % (ref 11.6–15.1)
GFR SERPL CREATININE-BSD FRML MDRD: 65 ML/MIN/1.73SQ M
GLUCOSE P FAST SERPL-MCNC: 94 MG/DL (ref 65–99)
HCT VFR BLD AUTO: 39.7 % (ref 34.8–46.1)
HGB BLD-MCNC: 12.1 G/DL (ref 11.5–15.4)
MCH RBC QN AUTO: 28.3 PG (ref 26.8–34.3)
MCHC RBC AUTO-ENTMCNC: 30.5 G/DL (ref 31.4–37.4)
MCV RBC AUTO: 93 FL (ref 82–98)
PLATELET # BLD AUTO: 224 THOUSANDS/UL (ref 149–390)
PMV BLD AUTO: 11 FL (ref 8.9–12.7)
POTASSIUM SERPL-SCNC: 4.6 MMOL/L (ref 3.5–5.3)
PROT SERPL-MCNC: 6.7 G/DL (ref 6.4–8.4)
RBC # BLD AUTO: 4.27 MILLION/UL (ref 3.81–5.12)
SODIUM SERPL-SCNC: 142 MMOL/L (ref 135–147)
WBC # BLD AUTO: 4.47 THOUSAND/UL (ref 4.31–10.16)

## 2024-09-11 PROCEDURE — 85027 COMPLETE CBC AUTOMATED: CPT

## 2024-09-11 PROCEDURE — 80053 COMPREHEN METABOLIC PANEL: CPT

## 2024-09-11 PROCEDURE — 36415 COLL VENOUS BLD VENIPUNCTURE: CPT

## 2024-09-12 ENCOUNTER — TELEPHONE (OUTPATIENT)
Age: 84
End: 2024-09-12

## 2024-09-19 ENCOUNTER — OFFICE VISIT (OUTPATIENT)
Dept: FAMILY MEDICINE CLINIC | Facility: HOSPITAL | Age: 84
End: 2024-09-19
Payer: MEDICARE

## 2024-09-19 VITALS
HEIGHT: 58 IN | DIASTOLIC BLOOD PRESSURE: 80 MMHG | HEART RATE: 72 BPM | WEIGHT: 218 LBS | OXYGEN SATURATION: 95 % | SYSTOLIC BLOOD PRESSURE: 126 MMHG | BODY MASS INDEX: 45.76 KG/M2 | RESPIRATION RATE: 16 BRPM

## 2024-09-19 DIAGNOSIS — K64.9 HEMORRHOIDS, UNSPECIFIED HEMORRHOID TYPE: ICD-10-CM

## 2024-09-19 DIAGNOSIS — Z00.00 MEDICARE ANNUAL WELLNESS VISIT, SUBSEQUENT: ICD-10-CM

## 2024-09-19 DIAGNOSIS — J30.1 SEASONAL ALLERGIC RHINITIS DUE TO POLLEN: ICD-10-CM

## 2024-09-19 DIAGNOSIS — I10 ESSENTIAL HYPERTENSION: ICD-10-CM

## 2024-09-19 DIAGNOSIS — I71.21 ANEURYSM OF ASCENDING AORTA WITHOUT RUPTURE (HCC): Primary | ICD-10-CM

## 2024-09-19 PROCEDURE — 99213 OFFICE O/P EST LOW 20 MIN: CPT | Performed by: INTERNAL MEDICINE

## 2024-09-19 PROCEDURE — G0439 PPPS, SUBSEQ VISIT: HCPCS | Performed by: INTERNAL MEDICINE

## 2024-09-19 RX ORDER — HYDROCORTISONE 25 MG/G
CREAM TOPICAL 2 TIMES DAILY
Qty: 28 G | Refills: 3 | Status: SHIPPED | OUTPATIENT
Start: 2024-09-19 | End: 2024-09-26

## 2024-09-19 RX ORDER — MOMETASONE FUROATE MONOHYDRATE 50 UG/1
2 SPRAY, METERED NASAL DAILY
Qty: 17 G | Refills: 5 | Status: SHIPPED | OUTPATIENT
Start: 2024-09-19

## 2024-09-19 NOTE — ASSESSMENT & PLAN NOTE
Pt uses proctosol cream when hemorrhoids act up  Will refill it  Cbc showed no anemia on 9/11    Orders:    hydrocortisone (ANUSOL-HC) 2.5 % rectal cream; Apply topically 2 (two) times a day for 7 days

## 2024-09-19 NOTE — PATIENT INSTRUCTIONS
Medicare Preventive Visit Patient Instructions  Thank you for completing your Welcome to Medicare Visit or Medicare Annual Wellness Visit today. Your next wellness visit will be due in one year (9/20/2025).  The screening/preventive services that you may require over the next 5-10 years are detailed below. Some tests may not apply to you based off risk factors and/or age. Screening tests ordered at today's visit but not completed yet may show as past due. Also, please note that scanned in results may not display below.  Preventive Screenings:  Service Recommendations Previous Testing/Comments   Colorectal Cancer Screening  * Colonoscopy    * Fecal Occult Blood Test (FOBT)/Fecal Immunochemical Test (FIT)  * Fecal DNA/Cologuard Test  * Flexible Sigmoidoscopy Age: 45-75 years old   Colonoscopy: every 10 years (may be performed more frequently if at higher risk)  OR  FOBT/FIT: every 1 year  OR  Cologuard: every 3 years  OR  Sigmoidoscopy: every 5 years  Screening may be recommended earlier than age 45 if at higher risk for colorectal cancer. Also, an individualized decision between you and your healthcare provider will decide whether screening between the ages of 76-85 would be appropriate. Colonoscopy: 07/16/2018  FOBT/FIT: Not on file  Cologuard: Not on file  Sigmoidoscopy: Not on file    Screening Not Indicated     Breast Cancer Screening Age: 40+ years old  Frequency: every 1-2 years  Not required if history of left and right mastectomy Mammogram: Not on file        Cervical Cancer Screening Between the ages of 21-29, pap smear recommended once every 3 years.   Between the ages of 30-65, can perform pap smear with HPV co-testing every 5 years.   Recommendations may differ for women with a history of total hysterectomy, cervical cancer, or abnormal pap smears in past. Pap Smear: Not on file    Screening Not Indicated   Hepatitis C Screening Once for adults born between 1945 and 1965  More frequently in patients at  high risk for Hepatitis C Hep C Antibody: Not on file        Diabetes Screening 1-2 times per year if you're at risk for diabetes or have pre-diabetes Fasting glucose: 94 mg/dL (9/11/2024)  A1C: No results in last 5 years (No results in last 5 years)  Screening Current   Cholesterol Screening Once every 5 years if you don't have a lipid disorder. May order more often based on risk factors. Lipid panel: 08/30/2023    Screening Not Indicated  History Lipid Disorder     Other Preventive Screenings Covered by Medicare:  Abdominal Aortic Aneurysm (AAA) Screening: covered once if your at risk. You're considered to be at risk if you have a family history of AAA.  Lung Cancer Screening: covers low dose CT scan once per year if you meet all of the following conditions: (1) Age 55-77; (2) No signs or symptoms of lung cancer; (3) Current smoker or have quit smoking within the last 15 years; (4) You have a tobacco smoking history of at least 20 pack years (packs per day multiplied by number of years you smoked); (5) You get a written order from a healthcare provider.  Glaucoma Screening: covered annually if you're considered high risk: (1) You have diabetes OR (2) Family history of glaucoma OR (3)  aged 50 and older OR (4)  American aged 65 and older  Osteoporosis Screening: covered every 2 years if you meet one of the following conditions: (1) You're estrogen deficient and at risk for osteoporosis based off medical history and other findings; (2) Have a vertebral abnormality; (3) On glucocorticoid therapy for more than 3 months; (4) Have primary hyperparathyroidism; (5) On osteoporosis medications and need to assess response to drug therapy.   Last bone density test (DXA Scan): 07/22/2019.  HIV Screening: covered annually if you're between the age of 15-65. Also covered annually if you are younger than 15 and older than 65 with risk factors for HIV infection. For pregnant patients, it is covered up to 3  times per pregnancy.    Immunizations:  Immunization Recommendations   Influenza Vaccine Annual influenza vaccination during flu season is recommended for all persons aged >= 6 months who do not have contraindications   Pneumococcal Vaccine   * Pneumococcal conjugate vaccine = PCV13 (Prevnar 13), PCV15 (Vaxneuvance), PCV20 (Prevnar 20)  * Pneumococcal polysaccharide vaccine = PPSV23 (Pneumovax) Adults 19-65 yo with certain risk factors or if 65+ yo  If never received any pneumonia vaccine: recommend Prevnar 20 (PCV20)  Give PCV20 if previously received 1 dose of PCV13 or PPSV23   Hepatitis B Vaccine 3 dose series if at intermediate or high risk (ex: diabetes, end stage renal disease, liver disease)   Respiratory syncytial virus (RSV) Vaccine - COVERED BY MEDICARE PART D  * RSVPreF3 (Arexvy) CDC recommends that adults 60 years of age and older may receive a single dose of RSV vaccine using shared clinical decision-making (SCDM)   Tetanus (Td) Vaccine - COST NOT COVERED BY MEDICARE PART B Following completion of primary series, a booster dose should be given every 10 years to maintain immunity against tetanus. Td may also be given as tetanus wound prophylaxis.   Tdap Vaccine - COST NOT COVERED BY MEDICARE PART B Recommended at least once for all adults. For pregnant patients, recommended with each pregnancy.   Shingles Vaccine (Shingrix) - COST NOT COVERED BY MEDICARE PART B  2 shot series recommended in those 19 years and older who have or will have weakened immune systems or those 50 years and older     Health Maintenance Due:  There are no preventive care reminders to display for this patient.  Immunizations Due:      Topic Date Due   • Pneumococcal Vaccine: 65+ Years (1 of 1 - PCV) Never done   • COVID-19 Vaccine (3 - 2023-24 season) 09/01/2024   • Influenza Vaccine (1) 09/01/2024     Advance Directives   What are advance directives?  Advance directives are legal documents that state your wishes and plans for  medical care. These plans are made ahead of time in case you lose your ability to make decisions for yourself. Advance directives can apply to any medical decision, such as the treatments you want, and if you want to donate organs.   What are the types of advance directives?  There are many types of advance directives, and each state has rules about how to use them. You may choose a combination of any of the following:  Living will:  This is a written record of the treatment you want. You can also choose which treatments you do not want, which to limit, and which to stop at a certain time. This includes surgery, medicine, IV fluid, and tube feedings.   Durable power of  for healthcare (DPAHC):  This is a written record that states who you want to make healthcare choices for you when you are unable to make them for yourself. This person, called a proxy, is usually a family member or a friend. You may choose more than 1 proxy.  Do not resuscitate (DNR) order:  A DNR order is used in case your heart stops beating or you stop breathing. It is a request not to have certain forms of treatment, such as CPR. A DNR order may be included in other types of advance directives.  Medical directive:  This covers the care that you want if you are in a coma, near death, or unable to make decisions for yourself. You can list the treatments you want for each condition. Treatment may include pain medicine, surgery, blood transfusions, dialysis, IV or tube feedings, and a ventilator (breathing machine).  Values history:  This document has questions about your views, beliefs, and how you feel and think about life. This information can help others choose the care that you would choose.  Why are advance directives important?  An advance directive helps you control your care. Although spoken wishes may be used, it is better to have your wishes written down. Spoken wishes can be misunderstood, or not followed. Treatments may be given  even if you do not want them. An advance directive may make it easier for your family to make difficult choices about your care.   Urinary Incontinence   Urinary incontinence (UI)  is when you lose control of your bladder. UI develops because your bladder cannot store or empty urine properly. The 3 most common types of UI are stress incontinence, urge incontinence, or both.  Medicines:   May be given to help strengthen your bladder control. Report any side effects of medication to your healthcare provider.  Do pelvic muscle exercises often:  Your pelvic muscles help you stop urinating. Squeeze these muscles tight for 5 seconds, then relax for 5 seconds. Gradually work up to squeezing for 10 seconds. Do 3 sets of 15 repetitions a day, or as directed. This will help strengthen your pelvic muscles and improve bladder control.  Train your bladder:  Go to the bathroom at set times, such as every 2 hours, even if you do not feel the urge to go. You can also try to hold your urine when you feel the urge to go. For example, hold your urine for 5 minutes when you feel the urge to go. As that becomes easier, hold your urine for 10 minutes.   Self-care:   Keep a UI record.  Write down how often you leak urine and how much you leak. Make a note of what you were doing when you leaked urine.  Drink liquids as directed. You may need to limit the amount of liquid you drink to help control your urine leakage. Do not drink any liquid right before you go to bed. Limit or do not have drinks that contain caffeine or alcohol.   Prevent constipation.  Eat a variety of high-fiber foods. Good examples are high-fiber cereals, beans, vegetables, and whole-grain breads. Walking is the best way to trigger your intestines to have a bowel movement.  Exercise regularly and maintain a healthy weight.  Weight loss and exercise will decrease pressure on your bladder and help you control your leakage.   Use a catheter as directed  to help empty your  bladder. A catheter is a tiny, plastic tube that is put into your bladder to drain your urine.   Go to behavior therapy as directed.  Behavior therapy may be used to help you learn to control your urge to urinate.    Weight Management   Why it is important to manage your weight:  Being overweight increases your risk of health conditions such as heart disease, high blood pressure, type 2 diabetes, and certain types of cancer. It can also increase your risk for osteoarthritis, sleep apnea, and other respiratory problems. Aim for a slow, steady weight loss. Even a small amount of weight loss can lower your risk of health problems.  How to lose weight safely:  A safe and healthy way to lose weight is to eat fewer calories and get regular exercise. You can lose up about 1 pound a week by decreasing the number of calories you eat by 500 calories each day.   Healthy meal plan for weight management:  A healthy meal plan includes a variety of foods, contains fewer calories, and helps you stay healthy. A healthy meal plan includes the following:  Eat whole-grain foods more often.  A healthy meal plan should contain fiber. Fiber is the part of grains, fruits, and vegetables that is not broken down by your body. Whole-grain foods are healthy and provide extra fiber in your diet. Some examples of whole-grain foods are whole-wheat breads and pastas, oatmeal, brown rice, and bulgur.  Eat a variety of vegetables every day.  Include dark, leafy greens such as spinach, kale, mary greens, and mustard greens. Eat yellow and orange vegetables such as carrots, sweet potatoes, and winter squash.   Eat a variety of fruits every day.  Choose fresh or canned fruit (canned in its own juice or light syrup) instead of juice. Fruit juice has very little or no fiber.  Eat low-fat dairy foods.  Drink fat-free (skim) milk or 1% milk. Eat fat-free yogurt and low-fat cottage cheese. Try low-fat cheeses such as mozzarella and other reduced-fat  cheeses.  Choose meat and other protein foods that are low in fat.  Choose beans or other legumes such as split peas or lentils. Choose fish, skinless poultry (chicken or turkey), or lean cuts of red meat (beef or pork). Before you cook meat or poultry, cut off any visible fat.   Use less fat and oil.  Try baking foods instead of frying them. Add less fat, such as margarine, sour cream, regular salad dressing and mayonnaise to foods. Eat fewer high-fat foods. Some examples of high-fat foods include french fries, doughnuts, ice cream, and cakes.  Eat fewer sweets.  Limit foods and drinks that are high in sugar. This includes candy, cookies, regular soda, and sweetened drinks.  Exercise:  Exercise at least 30 minutes per day on most days of the week. Some examples of exercise include walking, biking, dancing, and swimming. You can also fit in more physical activity by taking the stairs instead of the elevator or parking farther away from stores. Ask your healthcare provider about the best exercise plan for you.      © Copyright [employer] 2018 Information is for End User's use only and may not be sold, redistributed or otherwise used for commercial purposes. All illustrations and images included in CareNotes® are the copyrighted property of [employer] or [employer]      Medicare Preventive Visit Patient Instructions  Thank you for completing your Welcome to Medicare Visit or Medicare Annual Wellness Visit today. Your next wellness visit will be due in one year (9/20/2025).  The screening/preventive services that you may require over the next 5-10 years are detailed below. Some tests may not apply to you based off risk factors and/or age. Screening tests ordered at today's visit but not completed yet may show as past due. Also, please note that scanned in results may not display below.  Preventive Screenings:  Service Recommendations Previous Testing/Comments   Colorectal Cancer Screening  * Colonoscopy     * Fecal Occult Blood Test (FOBT)/Fecal Immunochemical Test (FIT)  * Fecal DNA/Cologuard Test  * Flexible Sigmoidoscopy Age: 45-75 years old   Colonoscopy: every 10 years (may be performed more frequently if at higher risk)  OR  FOBT/FIT: every 1 year  OR  Cologuard: every 3 years  OR  Sigmoidoscopy: every 5 years  Screening may be recommended earlier than age 45 if at higher risk for colorectal cancer. Also, an individualized decision between you and your healthcare provider will decide whether screening between the ages of 76-85 would be appropriate. Colonoscopy: 07/16/2018  FOBT/FIT: Not on file  Cologuard: Not on file  Sigmoidoscopy: Not on file    Screening Not Indicated     Breast Cancer Screening Age: 40+ years old  Frequency: every 1-2 years  Not required if history of left and right mastectomy Mammogram: Not on file        Cervical Cancer Screening Between the ages of 21-29, pap smear recommended once every 3 years.   Between the ages of 30-65, can perform pap smear with HPV co-testing every 5 years.   Recommendations may differ for women with a history of total hysterectomy, cervical cancer, or abnormal pap smears in past. Pap Smear: Not on file    Screening Not Indicated   Hepatitis C Screening Once for adults born between 1945 and 1965  More frequently in patients at high risk for Hepatitis C Hep C Antibody: Not on file        Diabetes Screening 1-2 times per year if you're at risk for diabetes or have pre-diabetes Fasting glucose: 94 mg/dL (9/11/2024)  A1C: No results in last 5 years (No results in last 5 years)  Screening Current   Cholesterol Screening Once every 5 years if you don't have a lipid disorder. May order more often based on risk factors. Lipid panel: 08/30/2023    Screening Not Indicated  History Lipid Disorder     Other Preventive Screenings Covered by Medicare:  Abdominal Aortic Aneurysm (AAA) Screening: covered once if your at risk. You're considered to be at risk if you have a family  history of AAA.  Lung Cancer Screening: covers low dose CT scan once per year if you meet all of the following conditions: (1) Age 55-77; (2) No signs or symptoms of lung cancer; (3) Current smoker or have quit smoking within the last 15 years; (4) You have a tobacco smoking history of at least 20 pack years (packs per day multiplied by number of years you smoked); (5) You get a written order from a healthcare provider.  Glaucoma Screening: covered annually if you're considered high risk: (1) You have diabetes OR (2) Family history of glaucoma OR (3)  aged 50 and older OR (4)  American aged 65 and older  Osteoporosis Screening: covered every 2 years if you meet one of the following conditions: (1) You're estrogen deficient and at risk for osteoporosis based off medical history and other findings; (2) Have a vertebral abnormality; (3) On glucocorticoid therapy for more than 3 months; (4) Have primary hyperparathyroidism; (5) On osteoporosis medications and need to assess response to drug therapy.   Last bone density test (DXA Scan): 07/22/2019.  HIV Screening: covered annually if you're between the age of 15-65. Also covered annually if you are younger than 15 and older than 65 with risk factors for HIV infection. For pregnant patients, it is covered up to 3 times per pregnancy.    Immunizations:  Immunization Recommendations   Influenza Vaccine Annual influenza vaccination during flu season is recommended for all persons aged >= 6 months who do not have contraindications   Pneumococcal Vaccine   * Pneumococcal conjugate vaccine = PCV13 (Prevnar 13), PCV15 (Vaxneuvance), PCV20 (Prevnar 20)  * Pneumococcal polysaccharide vaccine = PPSV23 (Pneumovax) Adults 19-63 yo with certain risk factors or if 65+ yo  If never received any pneumonia vaccine: recommend Prevnar 20 (PCV20)  Give PCV20 if previously received 1 dose of PCV13 or PPSV23   Hepatitis B Vaccine 3 dose series if at intermediate or high  risk (ex: diabetes, end stage renal disease, liver disease)   Respiratory syncytial virus (RSV) Vaccine - COVERED BY MEDICARE PART D  * RSVPreF3 (Arexvy) CDC recommends that adults 60 years of age and older may receive a single dose of RSV vaccine using shared clinical decision-making (SCDM)   Tetanus (Td) Vaccine - COST NOT COVERED BY MEDICARE PART B Following completion of primary series, a booster dose should be given every 10 years to maintain immunity against tetanus. Td may also be given as tetanus wound prophylaxis.   Tdap Vaccine - COST NOT COVERED BY MEDICARE PART B Recommended at least once for all adults. For pregnant patients, recommended with each pregnancy.   Shingles Vaccine (Shingrix) - COST NOT COVERED BY MEDICARE PART B  2 shot series recommended in those 19 years and older who have or will have weakened immune systems or those 50 years and older     Health Maintenance Due:  There are no preventive care reminders to display for this patient.  Immunizations Due:      Topic Date Due   • Pneumococcal Vaccine: 65+ Years (1 of 1 - PCV) Never done   • COVID-19 Vaccine (3 - 2023-24 season) 09/01/2024   • Influenza Vaccine (1) 09/01/2024     Advance Directives   What are advance directives?  Advance directives are legal documents that state your wishes and plans for medical care. These plans are made ahead of time in case you lose your ability to make decisions for yourself. Advance directives can apply to any medical decision, such as the treatments you want, and if you want to donate organs.   What are the types of advance directives?  There are many types of advance directives, and each state has rules about how to use them. You may choose a combination of any of the following:  Living will:  This is a written record of the treatment you want. You can also choose which treatments you do not want, which to limit, and which to stop at a certain time. This includes surgery, medicine, IV fluid, and tube  feedings.   Durable power of  for healthcare (DPA):  This is a written record that states who you want to make healthcare choices for you when you are unable to make them for yourself. This person, called a proxy, is usually a family member or a friend. You may choose more than 1 proxy.  Do not resuscitate (DNR) order:  A DNR order is used in case your heart stops beating or you stop breathing. It is a request not to have certain forms of treatment, such as CPR. A DNR order may be included in other types of advance directives.  Medical directive:  This covers the care that you want if you are in a coma, near death, or unable to make decisions for yourself. You can list the treatments you want for each condition. Treatment may include pain medicine, surgery, blood transfusions, dialysis, IV or tube feedings, and a ventilator (breathing machine).  Values history:  This document has questions about your views, beliefs, and how you feel and think about life. This information can help others choose the care that you would choose.  Why are advance directives important?  An advance directive helps you control your care. Although spoken wishes may be used, it is better to have your wishes written down. Spoken wishes can be misunderstood, or not followed. Treatments may be given even if you do not want them. An advance directive may make it easier for your family to make difficult choices about your care.   Urinary Incontinence   Urinary incontinence (UI)  is when you lose control of your bladder. UI develops because your bladder cannot store or empty urine properly. The 3 most common types of UI are stress incontinence, urge incontinence, or both.  Medicines:   May be given to help strengthen your bladder control. Report any side effects of medication to your healthcare provider.  Do pelvic muscle exercises often:  Your pelvic muscles help you stop urinating. Squeeze these muscles tight for 5 seconds, then relax  for 5 seconds. Gradually work up to squeezing for 10 seconds. Do 3 sets of 15 repetitions a day, or as directed. This will help strengthen your pelvic muscles and improve bladder control.  Train your bladder:  Go to the bathroom at set times, such as every 2 hours, even if you do not feel the urge to go. You can also try to hold your urine when you feel the urge to go. For example, hold your urine for 5 minutes when you feel the urge to go. As that becomes easier, hold your urine for 10 minutes.   Self-care:   Keep a UI record.  Write down how often you leak urine and how much you leak. Make a note of what you were doing when you leaked urine.  Drink liquids as directed. You may need to limit the amount of liquid you drink to help control your urine leakage. Do not drink any liquid right before you go to bed. Limit or do not have drinks that contain caffeine or alcohol.   Prevent constipation.  Eat a variety of high-fiber foods. Good examples are high-fiber cereals, beans, vegetables, and whole-grain breads. Walking is the best way to trigger your intestines to have a bowel movement.  Exercise regularly and maintain a healthy weight.  Weight loss and exercise will decrease pressure on your bladder and help you control your leakage.   Use a catheter as directed  to help empty your bladder. A catheter is a tiny, plastic tube that is put into your bladder to drain your urine.   Go to behavior therapy as directed.  Behavior therapy may be used to help you learn to control your urge to urinate.    Weight Management   Why it is important to manage your weight:  Being overweight increases your risk of health conditions such as heart disease, high blood pressure, type 2 diabetes, and certain types of cancer. It can also increase your risk for osteoarthritis, sleep apnea, and other respiratory problems. Aim for a slow, steady weight loss. Even a small amount of weight loss can lower your risk of health problems.  How to lose  weight safely:  A safe and healthy way to lose weight is to eat fewer calories and get regular exercise. You can lose up about 1 pound a week by decreasing the number of calories you eat by 500 calories each day.   Healthy meal plan for weight management:  A healthy meal plan includes a variety of foods, contains fewer calories, and helps you stay healthy. A healthy meal plan includes the following:  Eat whole-grain foods more often.  A healthy meal plan should contain fiber. Fiber is the part of grains, fruits, and vegetables that is not broken down by your body. Whole-grain foods are healthy and provide extra fiber in your diet. Some examples of whole-grain foods are whole-wheat breads and pastas, oatmeal, brown rice, and bulgur.  Eat a variety of vegetables every day.  Include dark, leafy greens such as spinach, kale, mary greens, and mustard greens. Eat yellow and orange vegetables such as carrots, sweet potatoes, and winter squash.   Eat a variety of fruits every day.  Choose fresh or canned fruit (canned in its own juice or light syrup) instead of juice. Fruit juice has very little or no fiber.  Eat low-fat dairy foods.  Drink fat-free (skim) milk or 1% milk. Eat fat-free yogurt and low-fat cottage cheese. Try low-fat cheeses such as mozzarella and other reduced-fat cheeses.  Choose meat and other protein foods that are low in fat.  Choose beans or other legumes such as split peas or lentils. Choose fish, skinless poultry (chicken or turkey), or lean cuts of red meat (beef or pork). Before you cook meat or poultry, cut off any visible fat.   Use less fat and oil.  Try baking foods instead of frying them. Add less fat, such as margarine, sour cream, regular salad dressing and mayonnaise to foods. Eat fewer high-fat foods. Some examples of high-fat foods include french fries, doughnuts, ice cream, and cakes.  Eat fewer sweets.  Limit foods and drinks that are high in sugar. This includes candy, cookies,  regular soda, and sweetened drinks.  Exercise:  Exercise at least 30 minutes per day on most days of the week. Some examples of exercise include walking, biking, dancing, and swimming. You can also fit in more physical activity by taking the stairs instead of the elevator or parking farther away from stores. Ask your healthcare provider about the best exercise plan for you.      © Copyright [employer] 2018 Information is for End User's use only and may not be sold, redistributed or otherwise used for commercial purposes. All illustrations and images included in CareNotes® are the copyrighted property of [employer]M., Inc. or [employer]

## 2024-09-19 NOTE — PROGRESS NOTES
Ambulatory Visit  Name: Davida Ring      : 1940      MRN: [id number]  Encounter Provider: Dorcas Schumacher MD  Encounter Date: 2024   Encounter department: [facility] PRIMARY CARE [address]    Assessment & Plan  Aneurysm of ascending aorta without rupture (HCC)  Pt had 42mm ascending aortic aneurysm a year ago  Denies chest pain, dyspnea  Reviewed ct chest from 2023  Will repeat ct chest    Orders:    CT chest wo contrast; Future    Essential hypertension  Patient has hypertension.  Her blood pressure is stable today.  Her electrolytes were normal on  and renal function was stable.  She will continue Diovan and metoprolol.  I will recheck her labs in 6 months before I see her    Orders:    Comprehensive metabolic panel; Future    CBC; Future    TSH, 3rd generation with Free T4 reflex; Future    Seasonal allergic rhinitis due to pollen  Uses mometasone nasal spray to control allergies in the fall with good results  Continue same    Orders:    mometasone (NASONEX) 50 mcg/act nasal spray; 2 sprays into each nostril daily    Hemorrhoids, unspecified hemorrhoid type  Pt uses proctosol cream when hemorrhoids act up  Will refill it  Cbc showed no anemia on     Orders:    hydrocortisone (ANUSOL-HC) 2.5 % rectal cream; Apply topically 2 (two) times a day for 7 days    Medicare annual wellness visit, subsequent           Depression Screening and Follow-up Plan: Patient was screened for depression during today's encounter. They screened negative with a PHQ-2 score of 0.    Urinary Incontinence Plan of Care: counseling topics discussed: limiting fluid intake 3-4 hours before bed.       Preventive health issues were discussed with patient, and age appropriate screening tests were ordered as noted in patient's After Visit Summary. Personalized health advice and appropriate referrals for health education or preventive services given if needed, as noted in patient's After Visit  Summary.    History of Present Illness     HPI   Patient Care Team:  Dorcas Schumacher MD as PCP - General  MD Orion Coles MD Nicholas Parrish Taylor, MD    Review of Systems   HENT:  Negative for hearing loss.    Respiratory:  Negative for shortness of breath.    Cardiovascular:  Negative for chest pain.   Gastrointestinal:  Negative for abdominal pain and blood in stool.   Genitourinary:  Negative for dysuria, hematuria and vaginal bleeding.   Musculoskeletal:  Positive for myalgias (She has intermittent muscle pains, most recently her right upper arm muscles are acting up.).   Psychiatric/Behavioral:  Negative for dysphoric mood.      Medical History Reviewed by provider this encounter:  Tobacco  Allergies  Meds  Problems  Med Hx  Surg Hx  Fam Hx       Annual Wellness Visit Questionnaire   Davida is here for her Subsequent Wellness visit.     Health Risk Assessment:   Patient rates overall health as good. Patient feels that their physical health rating is same. Patient is satisfied with their life. Eyesight was rated as same. Hearing was rated as same. Patient feels that their emotional and mental health rating is same. Patients states they are never, rarely angry. Patient states they are sometimes unusually tired/fatigued. Pain experienced in the last 7 days has been some. Patient's pain rating has been 6/10. Patient states that she has experienced no weight loss or gain in last 6 months.     Depression Screening:   PHQ-2 Score: 0      Fall Risk Screening:   In the past year, patient has experienced: no history of falling in past year      Urinary Incontinence Screening:   Patient has leaked urine accidently in the last six months. Wears pads    Home Safety:  Patient has trouble with stairs inside or outside of their home. Patient has working smoke alarms and has no working carbon monoxide detector. Home safety hazards include: none.     Nutrition:   Current diet is Regular.      Medications:   Patient is currently taking over-the-counter supplements. OTC medications include: see medication list. Patient is able to manage medications.     Activities of Daily Living (ADLs)/Instrumental Activities of Daily Living (IADLs):   Walk and transfer into and out of bed and chair?: Yes  Dress and groom yourself?: Yes    Bathe or shower yourself?: Yes    Feed yourself? Yes  Do your laundry/housekeeping?: Yes  Manage your money, pay your bills and track your expenses?: Yes  Make your own meals?: Yes    Do your own shopping?: Yes    Previous Hospitalizations:   Any hospitalizations or ED visits within the last 12 months?: No      Advance Care Planning:   Living will: Yes    Advanced directive: Yes    End of Life Decisions reviewed with patient: Yes      PREVENTIVE SCREENINGS      Cardiovascular Screening:    General: Screening Not Indicated and History Lipid Disorder      Diabetes Screening:     General: Screening Current      Colorectal Cancer Screening:     General: Screening Not Indicated      Breast Cancer Screening:     General: Screening Not Indicated      Cervical Cancer Screening:    General: Screening Not Indicated      Lung Cancer Screening:     General: Screening Not Indicated    Screening, Brief Intervention, and Referral to Treatment (SBIRT)    Screening  Typical number of drinks in a day: 0    AUDIT-C Screenin) How often did you have a drink containing alcohol in the past year? never  2) How many drinks did you have on a typical day when you were drinking in the past year? 0  3) How often did you have 6 or more drinks on one occasion in the past year? never    AUDIT-C Score: 0  Interpretation: Score 0-2 (female): Negative screen for alcohol misuse    Single Item Drug Screening:  How often have you used an illegal drug (including marijuana) or a prescription medication for non-medical reasons in the past year? never    Single Item Drug Screen Score: 0  Interpretation: Negative screen  "for possible drug use disorder    Brief Intervention  Alcohol & drug use screenings were reviewed. No concerns regarding substance use disorder identified.     Other Counseling Topics:   Regular weightbearing exercise.     Social Determinants of Health     Financial Resource Strain: Low Risk  (9/5/2023)    Overall Financial Resource Strain (CARDIA)     Difficulty of Paying Living Expenses: Not hard at all   Food Insecurity: No Food Insecurity (9/19/2024)    Hunger Vital Sign     Worried About Running Out of Food in the Last Year: Never true     Ran Out of Food in the Last Year: Never true   Transportation Needs: No Transportation Needs (9/19/2024)    PRAPARE - Transportation     Lack of Transportation (Medical): No     Lack of Transportation (Non-Medical): No   Housing Stability: Low Risk  (9/19/2024)    Housing Stability Vital Sign     Unable to Pay for Housing in the Last Year: No     Number of Times Moved in the Last Year: 0     Homeless in the Last Year: No   Utilities: Not At Risk (9/19/2024)    [facility] Utilities     Threatened with loss of utilities: No     No results found.    Objective     /80 (BP Location: Left arm, Patient Position: Sitting)   Pulse 72   Resp 16   Ht 4' 10\" (1.473 m)   Wt 98.9 kg (218 lb)   LMP  (LMP Unknown)   SpO2 95%   BMI 45.56 kg/m²     Physical Exam  Constitutional:       General: She is not in acute distress.     Appearance: She is not toxic-appearing.   HENT:      Head: Normocephalic.      Right Ear: Tympanic membrane normal.      Left Ear: Tympanic membrane normal.   Eyes:      Conjunctiva/sclera: Conjunctivae normal.   Cardiovascular:      Rate and Rhythm: Normal rate and regular rhythm.      Heart sounds: No murmur heard.     No gallop.   Pulmonary:      Effort: No respiratory distress.      Breath sounds: No wheezing or rales.   Abdominal:      General: Bowel sounds are normal.      Palpations: Abdomen is soft.      Tenderness: There is no abdominal tenderness. "   Skin:     General: Skin is warm and dry.   Neurological:      Mental Status: She is alert and oriented to person, place, and time.   Psychiatric:         Mood and Affect: Mood normal.         Thought Content: Thought content normal.

## 2024-09-19 NOTE — ASSESSMENT & PLAN NOTE
Pt had 42mm ascending aortic aneurysm a year ago  Denies chest pain, dyspnea  Reviewed ct chest from 9/2023  Will repeat ct chest    Orders:    CT chest wo contrast; Future

## 2024-09-19 NOTE — ASSESSMENT & PLAN NOTE
Uses mometasone nasal spray to control allergies in the fall with good results  Continue same    Orders:    mometasone (NASONEX) 50 mcg/act nasal spray; 2 sprays into each nostril daily

## 2024-09-19 NOTE — ASSESSMENT & PLAN NOTE
Patient has hypertension.  Her blood pressure is stable today.  Her electrolytes were normal on September 11 and renal function was stable.  She will continue Diovan and metoprolol.  I will recheck her labs in 6 months before I see her    Orders:    Comprehensive metabolic panel; Future    CBC; Future    TSH, 3rd generation with Free T4 reflex; Future

## 2024-09-30 ENCOUNTER — HOSPITAL ENCOUNTER (OUTPATIENT)
Dept: CT IMAGING | Facility: HOSPITAL | Age: 84
Discharge: HOME | End: 2024-09-30
Attending: INTERNAL MEDICINE
Payer: MEDICARE

## 2024-09-30 DIAGNOSIS — I71.21 ANEURYSM OF ASCENDING AORTA WITHOUT RUPTURE (HCC): ICD-10-CM

## 2024-09-30 PROCEDURE — 71250 CT THORAX DX C-: CPT

## 2024-10-02 ENCOUNTER — TELEPHONE (OUTPATIENT)
Dept: FAMILY MEDICINE CLINIC | Facility: HOSPITAL | Age: 84
End: 2024-10-02

## 2024-10-02 NOTE — TELEPHONE ENCOUNTER
----- Message from Dorcas Schumacher MD sent at 10/2/2024  9:44 AM EDT -----  Please call patient and tell her that CT of the chest shows a very stable size of her ascending thoracic aortic dilatation.  It has not grown larger!

## 2024-10-24 DIAGNOSIS — F41.9 ANXIETY: ICD-10-CM

## 2024-10-24 RX ORDER — LORAZEPAM 1 MG/1
TABLET ORAL
Qty: 90 TABLET | Refills: 1 | Status: SHIPPED | OUTPATIENT
Start: 2024-10-24

## 2024-10-24 NOTE — TELEPHONE ENCOUNTER
Reason for call:   [x] Refill   [] Prior Auth  [] Other:     Office:   [x] PCP/Provider -   [] Specialty/Provider -     Medication: LORazepam (ATIVAN) 1 mg tablet     Dose/Frequency:  1 tablet as needed at bedtime     Quantity: 90    [id number] [place] [facility] [address] [facility] [address] [facility] [address] [phone]    Does the patient have enough for 3 days?   [x] Yes   [] No - Send as HP to POD

## 2024-11-21 DIAGNOSIS — I10 ESSENTIAL HYPERTENSION: ICD-10-CM

## 2024-11-21 RX ORDER — VALSARTAN 320 MG/1
320 TABLET ORAL DAILY
Qty: 90 TABLET | Refills: 1 | Status: SHIPPED | OUTPATIENT
Start: 2024-11-21 | End: 2025-11-16

## 2024-12-23 NOTE — PROGRESS NOTES
Hematology/Oncology Outpatient Follow- up Note  Mir Burroughs [de-identified] y o  female MRN: @ Encounter: [id number]        Date:  8/27/2018    Presenting Complaint/Diagnosis : Iron deficiency anemia    HPI:    Mir Burroughs is seen for initial consultation 4/23/2018 regarding Her iron deficiency anemia  She has a history of stage IB grade 1 endometrial carcinoma with atypical cytology for which she had a robotic assisted laparoscopic hysterectomy with bilateral salpingo-oophorectomy, bilateral pelvic lymph node dissection, excision of right pelvic peritoneum and repair of inherent vaginal laceration  The patient then got chemotherapy with Taxol and carboplatin  This all finished in April 2016  She was recently noticed to have hypochromic microcytic anemia  Iron studies showed iron deficiency  She was started on oral iron  Previous Hematologic/ Oncologic History:       Endometrial cancer ([facility] [address] )    1/5/2016 Initial Diagnosis     Endometrial cancer ([facility] [address] )         1/5/2016 Surgery     Robotic assisted total laparoscopic hysterectomy, bilateral salpingo-oophorectomy, bilateral pelvic lymph node dissection, excision of right pelvic peritoneum and repair of inherent vaginal laceration   A  19 of 23 mm depth of invasion, positive LVSI, 7 negative lymph nodes  B  Atypical peritoneal cytology with negative peritoneal biopsies            - 4/1/2016 Chemotherapy     Taxol 135 mg/m2 and carboplatin AUC 5 every 21 days  She completed 4 cycles  - 5/3/2016 Radiation     Vaginal brachytherapy            Current Hematologic/ Oncologic Treatment:      Oral iron  Interval History:      The patient returns for follow-up visit  Her iron studies have normalized  Hemoglobin is normal  She had a GI workup which was negative  Per the last note from her GYN oncologist she is RIVERA from her endometrial cancer  Denies any nausea denies any vomiting denies any diarrhea  Is tolerating the iron recently well   Her 14 point review of Stable, continue current medications and management.   systems today was negative  Cancer Staging:  Cancer Staging  Endometrial cancer [facility])  Staging form: Corpus Uteri - Carcinoma, AJCC 8th Edition  - Clinical: FIGO Stage IB - Signed by Virginia Peña PA-C on 2/14/2018    Test Results:    Imaging: No results found  Labs:   Lab Results   Component Value Date    WBC 4 17 (L) 08/20/2018    HGB 11 7 08/20/2018    HCT 36 3 08/20/2018    MCV 93 08/20/2018     08/20/2018     Lab Results   Component Value Date     08/20/2018    K 3 9 08/20/2018     08/20/2018    CO2 27 08/20/2018    ANIONGAP 6 01/06/2016    BUN 26 (H) 08/20/2018    CREATININE 0 76 08/20/2018    GLUCOSE 171 (H) 01/06/2016    GLUF 92 08/20/2018    CALCIUM 8 9 08/20/2018    AST 24 08/20/2018    ALT 29 08/20/2018    ALKPHOS 76 08/20/2018    PROT 6 5 12/23/2015    BILITOT 0 58 12/23/2015    EGFR 74 08/20/2018       Lab Results   Component Value Date    IRON 95 08/20/2018    TIBC 320 08/20/2018    FERRITIN 40 08/20/2018       ROS: As stated in the history of present illness otherwise his 14 point review of systems today was negative        Active Problems:   Patient Active Problem List   Diagnosis    Endometrial cancer ([facility] )    Iron deficiency anemia    Essential hypertension    Gastroesophageal reflux disease without esophagitis    Other hyperlipidemia    Morbid obesity with BMI of 45 0-49 9, adult (HCC)    Allergic rhinitis    Anxiety    Ascending aortic aneurysm ([facility] )    Hiatal hernia       Past Medical History:   Past Medical History:   Diagnosis Date    Anxiety     Cataract     [facility]     Depression     GERD (gastroesophageal reflux disease)     Hearing loss on left     Hypertension     Morbid obesity ([facility] [address] )     Osteoarthritis     Retinopathy     TIA (transient ischemic attack)     2001    Uterine cancer [facility])        Surgical History:   Past Surgical History:   Procedure Laterality Date    EYE SURGERY Left 10/2016    HYSTERECTOMY  LYMPH NODE BIOPSY      OOPHORECTOMY Bilateral     PELVIC LAPAROSCOPY         Family History:    Family History   Problem Relation Age of Onset    Breast cancer Sister     Prostate cancer Brother     Heart failure Mother     Anemia Mother     Alzheimer's disease Father     Substance Abuse Neg Hx         neg fam hx    Mental illness Neg Hx         neg fam hx       Cancer-related family history includes Breast cancer in her sister; Prostate cancer in her brother  Social History:   Social History     Social History    Marital status: Single     Spouse name: N/A    Number of children: N/A    Years of education: N/A     Occupational History    Not on file  Social History Main Topics    Smoking status: Never Smoker    Smokeless tobacco: Never Used    Alcohol use Yes      Comment: On occasion    Drug use: No    Sexual activity: Not on file     Other Topics Concern    Not on file     Social History Narrative    Uses seat belt    Caffeine use in the a m  Religious    Dental care, regularly     Exercise: yarkwork    Lives by [employer]  Feels safe at home  Current Medications:   Current Outpatient Prescriptions   Medication Sig Dispense Refill    acetaminophen (TYLENOL) 325 mg tablet Take 650 mg by mouth every 4 (four) hours as needed for mild pain   atorvastatin (LIPITOR) 10 mg tablet Take 10 mg by mouth daily   Calcium Carb-Cholecalciferol (CALCIUM 600 + D PO) Take 1 capsule by mouth daily   Cholecalciferol (VITAMIN D-3) 1000 UNITS CAPS Take 1 tablet by mouth daily   ferrous sulfate 324 (65 Fe) mg Take 1 tablet (324 mg total) by mouth daily before breakfast 30 tablet 3    LORazepam (ATIVAN) 1 mg tablet Take 1 mg by mouth Takes 1 whole tab at bedtime and a 1/2 tab if needed         metoprolol succinate (TOPROL-XL) 25 mg 24 hr tablet Take 25 mg by mouth daily        mometasone (NASONEX) 50 mcg/act nasal spray 2 sprays into each nostril daily 1 Act 5    multivitamin (THERAGRAN) TABS Take 1 tablet by mouth daily   omeprazole (PriLOSEC) 20 mg delayed release capsule Take 20 mg by mouth daily      valsartan (DIOVAN) 320 MG tablet Take 320 mg by mouth daily  No current facility-administered medications for this visit  Allergies: Allergies   Allergen Reactions    Ace Inhibitors      Annotation - [id number]: COUGH    Aspirin GI Intolerance     Category: Adverse Reaction; Annotation - [id number]: GI DISTRESS    Azithromycin Itching     Annotation - [id number]: PERIRECTAL IRRITATION    Chocolate     Eggs Or Egg-Derived Products      Annotation - [id number]: NO FLU SHOTS    Flu Virus Vaccine      Other reaction(s): ALLERGY TO EGGS       Physical Exam:    Body surface area is 1 87 meters squared  Wt Readings from Last 3 Encounters:   08/27/18 97 1 kg (214 lb)   08/15/18 95 8 kg (211 lb 3 2 oz)   07/09/18 96 6 kg (213 lb)        Temp Readings from Last 3 Encounters:   08/27/18 97 7 °F (36 5 °C) (Tympanic)   08/15/18 98 1 °F (36 7 °C) (Oral)   04/23/18 98 °F (36 7 °C) (Tympanic)        BP Readings from Last 3 Encounters:   08/27/18 112/78   08/15/18 140/80   07/09/18 124/76         Pulse Readings from Last 3 Encounters:   08/27/18 63   08/15/18 72   07/09/18 72        Physical Exam     Constitutional   General appearance: No acute distress, well appearing and well nourished  Eyes   Conjunctiva and lids: No swelling, erythema or discharge  Pupils and irises: Equal, round and reactive to light  Ears, Nose, Mouth, and Throat   External inspection of ears and nose: Normal     Nasal mucosa, septum, and turbinates: Normal without edema or erythema  Oropharynx: Normal with no erythema, edema, exudate or lesions  Pulmonary   Respiratory effort: No increased work of breathing or signs of respiratory distress  Auscultation of lungs: Clear to auscultation  Cardiovascular   Palpation of heart: Normal PMI, no thrills      Auscultation of heart: Normal rate and rhythm, normal S1 and S2, without murmurs  Examination of extremities for edema and/or varicosities: Normal     Carotid pulses: Normal     Abdomen   Abdomen: Non-tender, no masses  Liver and spleen: No hepatomegaly or splenomegaly  Lymphatic   Palpation of lymph nodes in neck: No lymphadenopathy  Musculoskeletal   Gait and station: Normal     Digits and nails: Normal without clubbing or cyanosis  Inspection/palpation of joints, bones, and muscles: Normal     Skin   Skin and subcutaneous tissue: Normal without rashes or lesions  Neurologic   Cranial nerves: Cranial nerves 2-12 intact  Sensation: No sensory loss  Psychiatric   Orientation to person, place, and time: Normal     Mood and affect: Normal         Assessment / Plan: This is a pleasant 33-year-old female with a past medical history of endometrial cancer status post surgery followed by chemotherapy and radiation in 2016 who was found to be iron deficient  She was on oral iron when she last saw me and prefer to continue on that instead of IV  Her most recent numbers show her hemoglobin has improved to 11 7 and her iron studies are all normal as is the MMA  He had an EGD which showed gastropathy  Per the note from her gynecologic oncologist she is RIVERA from her stage IB endometrial cancer  I I advised her she should have her bloodwork repeated in 6 months  She states she would rather just follow with her family doctor as she already has a lot of doctors  I think this is reasonable since her numbers have all normalized on oral iron  She will see them regularly  She will see me as needed  Her primary care physician will monitor her iron  Goals and Barriers:  Current Goal:  Prolong Survival from Iron defeciency  Barriers: None  Patient's Capacity to Self Care:  Patient  able to self care      Portions of the record may have been created with voice recognition software   Occasional wrong word or "sound a like" substitutions may have occurred due to the inherent limitations of voice recognition software   Read the chart carefully and recognize, using context, where substitutions have occurred

## 2025-01-20 DIAGNOSIS — K21.9 GASTROESOPHAGEAL REFLUX DISEASE WITHOUT ESOPHAGITIS: ICD-10-CM

## 2025-01-20 DIAGNOSIS — I10 ESSENTIAL HYPERTENSION: ICD-10-CM

## 2025-01-20 NOTE — TELEPHONE ENCOUNTER
Reason for call:   [x] Refill   [] Prior Auth  [] Other:     Office:   [x] PCP/Provider - Dorcas Schumacher,   [] Specialty/Provider -     Medication: metoprolol succinate (TOPROL-XL) 25 mg   Dose/Frequency:  TAKE 1 TABLET (25 MG TOTAL) BY MOUTH DAILY.   Quantity: 90, requesting 3RF    omeprazole (PriLOSEC) 20 mg    Take 1 capsule (20 mg total) by mouth daily as needed   Qty 90, 3 RF    Pharmacy: [facility] [place]    Does the patient have enough for 3 days?   [x] Yes   [] No - Send as HP to POD

## 2025-01-21 RX ORDER — METOPROLOL SUCCINATE 25 MG/1
25 TABLET, EXTENDED RELEASE ORAL DAILY
Qty: 90 TABLET | Refills: 0 | Status: SHIPPED | OUTPATIENT
Start: 2025-01-21 | End: 2026-01-16

## 2025-03-12 ENCOUNTER — APPOINTMENT (OUTPATIENT)
Dept: LAB | Facility: HOSPITAL | Age: 85
End: 2025-03-12
Payer: MEDICARE

## 2025-03-12 ENCOUNTER — RESULTS FOLLOW-UP (OUTPATIENT)
Dept: FAMILY MEDICINE CLINIC | Facility: HOSPITAL | Age: 85
End: 2025-03-12

## 2025-03-12 DIAGNOSIS — I10 ESSENTIAL HYPERTENSION: ICD-10-CM

## 2025-03-12 LAB
ERYTHROCYTE [DISTWIDTH] IN BLOOD BY AUTOMATED COUNT: 13.6 % (ref 11.6–15.1)
HCT VFR BLD AUTO: 39 % (ref 34.8–46.1)
HGB BLD-MCNC: 11.7 G/DL (ref 11.5–15.4)
MCH RBC QN AUTO: 28.2 PG (ref 26.8–34.3)
MCHC RBC AUTO-ENTMCNC: 30 G/DL (ref 31.4–37.4)
MCV RBC AUTO: 94 FL (ref 82–98)
PLATELET # BLD AUTO: 217 THOUSANDS/UL (ref 149–390)
PMV BLD AUTO: 10.7 FL (ref 8.9–12.7)
RBC # BLD AUTO: 4.15 MILLION/UL (ref 3.81–5.12)
WBC # BLD AUTO: 4.68 THOUSAND/UL (ref 4.31–10.16)

## 2025-03-12 PROCEDURE — 85027 COMPLETE CBC AUTOMATED: CPT

## 2025-03-12 PROCEDURE — 36415 COLL VENOUS BLD VENIPUNCTURE: CPT

## 2025-03-19 ENCOUNTER — OFFICE VISIT (OUTPATIENT)
Dept: FAMILY MEDICINE CLINIC | Facility: HOSPITAL | Age: 85
End: 2025-03-19
Payer: MEDICARE

## 2025-03-19 VITALS
WEIGHT: 210 LBS | SYSTOLIC BLOOD PRESSURE: 116 MMHG | BODY MASS INDEX: 44.08 KG/M2 | DIASTOLIC BLOOD PRESSURE: 80 MMHG | HEIGHT: 58 IN | RESPIRATION RATE: 16 BRPM | HEART RATE: 64 BPM | OXYGEN SATURATION: 97 % | TEMPERATURE: 97.7 F

## 2025-03-19 DIAGNOSIS — E55.9 VITAMIN D DEFICIENCY: ICD-10-CM

## 2025-03-19 DIAGNOSIS — E66.01 MORBID OBESITY WITH BMI OF 40.0-44.9, ADULT (HCC): ICD-10-CM

## 2025-03-19 DIAGNOSIS — I10 ESSENTIAL HYPERTENSION: Primary | ICD-10-CM

## 2025-03-19 DIAGNOSIS — I71.21 ANEURYSM OF ASCENDING AORTA WITHOUT RUPTURE (HCC): ICD-10-CM

## 2025-03-19 DIAGNOSIS — E78.49 OTHER HYPERLIPIDEMIA: ICD-10-CM

## 2025-03-19 PROCEDURE — 99214 OFFICE O/P EST MOD 30 MIN: CPT | Performed by: INTERNAL MEDICINE

## 2025-03-19 PROCEDURE — G2211 COMPLEX E/M VISIT ADD ON: HCPCS | Performed by: INTERNAL MEDICINE

## 2025-03-19 RX ORDER — VALSARTAN 320 MG/1
320 TABLET ORAL DAILY
Qty: 90 TABLET | Refills: 3 | Status: SHIPPED | OUTPATIENT
Start: 2025-03-19 | End: 2026-03-14

## 2025-03-19 RX ORDER — METOPROLOL SUCCINATE 25 MG/1
25 TABLET, EXTENDED RELEASE ORAL DAILY
Qty: 90 TABLET | Refills: 3 | Status: SHIPPED
Start: 2025-03-19 | End: 2026-03-14

## 2025-03-19 NOTE — ASSESSMENT & PLAN NOTE
Patient has a 40 mm ascending aortic aneurysm.  We reviewed CAT scan of the chest from last September.  She denies any chest pain.  Her blood pressure is controlled.  She will continue metoprolol and Diovan.  I will order CAT scan of the chest in September.  She is due for the CAT scan of the chest on September 30.

## 2025-03-19 NOTE — ASSESSMENT & PLAN NOTE
Patient has hypertension.  She presents for follow-up.  She is adherent to taking her Diovan and metoprolol.  She watches her salt intake.    Pressure is controlled today.  CMP was not done last week but I reordered for next visit.  Continue metoprolol and Diovan  Orders:  •  metoprolol succinate (TOPROL-XL) 25 mg 24 hr tablet; Take 1 tablet (25 mg total) by mouth daily  •  valsartan (DIOVAN) 320 MG tablet; Take 1 tablet (320 mg total) by mouth daily  •  Comprehensive metabolic panel; Future  •  CBC and Platelet; Future

## 2025-03-19 NOTE — PROGRESS NOTES
Name: Davida Ring      : 1940      MRN: [id number]  Encounter Provider: Dorcas Schumacher MD  Encounter Date: 3/19/2025   Encounter department: [facility] PRIMARY CARE [address]  :  Assessment & Plan  Essential hypertension  Patient has hypertension.  She presents for follow-up.  She is adherent to taking her Diovan and metoprolol.  She watches her salt intake.    Pressure is controlled today.  CMP was not done last week but I reordered for next visit.  Continue metoprolol and Diovan  Orders:  •  metoprolol succinate (TOPROL-XL) 25 mg 24 hr tablet; Take 1 tablet (25 mg total) by mouth daily  •  valsartan (DIOVAN) 320 MG tablet; Take 1 tablet (320 mg total) by mouth daily  •  Comprehensive metabolic panel; Future  •  CBC and Platelet; Future    Aneurysm of ascending aorta without rupture (HCC)  Patient has a 40 mm ascending aortic aneurysm.  We reviewed CAT scan of the chest from last September.  She denies any chest pain.  Her blood pressure is controlled.  She will continue metoprolol and Diovan.  I will order CAT scan of the chest in September.  She is due for the CAT scan of the chest on .       Other hyperlipidemia  She has hyperlipidemia she takes atorvastatin.  I will check her lipids and LFTs.  Orders:  •  Lipid panel; Future    Vitamin D deficiency  She has vitamin D deficiency.  She is take vitamin D supplements.  I will check her vitamin D level  Orders:  •  Vitamin D 25 hydroxy; Future    Morbid obesity with BMI of 40.0-44.9, adult (HCC)  BMI is 43.9.  She has morbid obesity.  She watches her diet.  She lost weight.  She will continue diet               Depression Screening and Follow-up Plan: Patient was screened for depression during today's encounter. They screened negative with a PHQ-2 score of 0.        History of Present Illness   HPI  Review of Systems    Objective   /80 (BP Location: Left arm, Patient Position: Sitting)   Pulse 64   Temp 97.7 °F (36.5 °C)  "(Tympanic)   Resp 16   Ht 4' 10\" (1.473 m)   Wt 95.3 kg (210 lb)   LMP  (LMP Unknown)   SpO2 97%   BMI 43.89 kg/m²      Physical Exam  Constitutional:       General: She is not in acute distress.     Appearance: She is not toxic-appearing.   HENT:      Head: Normocephalic.   Eyes:      Conjunctiva/sclera: Conjunctivae normal.   Cardiovascular:      Rate and Rhythm: Normal rate and regular rhythm.      Heart sounds: No murmur heard.  Pulmonary:      Effort: No respiratory distress.      Breath sounds: No wheezing or rales.   Abdominal:      General: Bowel sounds are normal.      Palpations: Abdomen is soft.      Tenderness: There is no abdominal tenderness.   Skin:     General: Skin is warm and dry.      Comments: She had no lower extremity edema   Neurological:      Mental Status: She is alert.   Psychiatric:         Mood and Affect: Mood normal.         "

## 2025-03-19 NOTE — ASSESSMENT & PLAN NOTE
She has hyperlipidemia she takes atorvastatin.  I will check her lipids and LFTs.  Orders:  •  Lipid panel; Future

## 2025-04-17 DIAGNOSIS — I10 ESSENTIAL HYPERTENSION: ICD-10-CM

## 2025-04-17 DIAGNOSIS — F41.9 ANXIETY: ICD-10-CM

## 2025-04-17 NOTE — TELEPHONE ENCOUNTER
Reason for call:   [x] Refill   [] Prior Auth  [x] Other: not a dup- Rx was listed as no print last     Office:   [x] PCP/Provider -   [] Specialty/Provider -     Medication:     metoprolol succinate (TOPROL-XL) 25 mg 24 hr tablet Take 1 tablet (25 mg total) by mouth daily,          LORazepam (ATIVAN) 1 m tablet as needed at bedtime,      Pharmacy: [facility] [id number] [place] [facility]     Local Pharmacy   Does the patient have enough for 3 days?   [x] Yes   [] No - Send as HP to POD    Mail Away Pharmacy   Does the patient have enough for 10 days?   [] Yes   [] No - Send as HP to POD

## 2025-04-18 RX ORDER — METOPROLOL SUCCINATE 25 MG/1
25 TABLET, EXTENDED RELEASE ORAL DAILY
Qty: 90 TABLET | Refills: 1 | Status: SHIPPED | OUTPATIENT
Start: 2025-04-18 | End: 2026-04-13

## 2025-04-18 RX ORDER — LORAZEPAM 1 MG/1
TABLET ORAL
Qty: 90 TABLET | Refills: 0 | Status: SHIPPED | OUTPATIENT
Start: 2025-04-18

## 2025-05-15 DIAGNOSIS — E78.49 OTHER HYPERLIPIDEMIA: ICD-10-CM

## 2025-05-15 NOTE — TELEPHONE ENCOUNTER
Requests 3 refills    Reason for call:   [x] Refill   [] Prior Auth  [] Other:     Office:   [x] PCP/Provider - [facility] [address]      [] Specialty/Provider -     Medication: Lipitor    Dose/Frequency: 10 mg     Quantity: #90    Pharmacy: [facility] [place]    Local Pharmacy   Does the patient have enough for 3 days?   [x] Yes   [] No - Send as HP to POD    Mail Away Pharmacy   Does the patient have enough for 10 days?   [] Yes   [] No - Send as HP to POD

## 2025-05-16 RX ORDER — ATORVASTATIN CALCIUM 10 MG/1
10 TABLET, FILM COATED ORAL DAILY
Qty: 90 TABLET | Refills: 3 | Status: SHIPPED | OUTPATIENT
Start: 2025-05-16

## 2025-07-15 DIAGNOSIS — F41.9 ANXIETY: ICD-10-CM

## 2025-07-15 RX ORDER — LORAZEPAM 1 MG/1
TABLET ORAL
Qty: 90 TABLET | Refills: 1 | Status: SHIPPED | OUTPATIENT
Start: 2025-07-15